# Patient Record
Sex: MALE | Race: WHITE | NOT HISPANIC OR LATINO | Employment: FULL TIME | URBAN - NONMETROPOLITAN AREA
[De-identification: names, ages, dates, MRNs, and addresses within clinical notes are randomized per-mention and may not be internally consistent; named-entity substitution may affect disease eponyms.]

---

## 2017-05-19 ENCOUNTER — HISTORY (OUTPATIENT)
Dept: HEALTH INFORMATION MANAGEMENT | Facility: OTHER | Age: 22
End: 2017-05-19

## 2017-05-24 ENCOUNTER — HISTORY (OUTPATIENT)
Dept: FAMILY MEDICINE | Facility: OTHER | Age: 22
End: 2017-05-24

## 2017-05-24 ENCOUNTER — HOSPITAL ENCOUNTER (OUTPATIENT)
Dept: RADIOLOGY | Facility: OTHER | Age: 22
End: 2017-05-24
Attending: NURSE PRACTITIONER

## 2017-05-24 ENCOUNTER — OFFICE VISIT - GICH (OUTPATIENT)
Dept: FAMILY MEDICINE | Facility: OTHER | Age: 22
End: 2017-05-24

## 2017-05-24 DIAGNOSIS — M25.562 PAIN IN LEFT KNEE: ICD-10-CM

## 2017-05-24 DIAGNOSIS — M25.561 PAIN IN RIGHT KNEE: ICD-10-CM

## 2017-05-24 DIAGNOSIS — G89.29 OTHER CHRONIC PAIN: ICD-10-CM

## 2017-05-24 DIAGNOSIS — R37 SEXUAL DYSFUNCTION: ICD-10-CM

## 2017-05-24 DIAGNOSIS — M25.571 PAIN IN RIGHT ANKLE: ICD-10-CM

## 2017-05-24 DIAGNOSIS — N52.9 MALE ERECTILE DYSFUNCTION: ICD-10-CM

## 2017-05-24 LAB
ABSOLUTE BASOPHILS - HISTORICAL: 0.1 THOU/CU MM
ABSOLUTE EOSINOPHILS - HISTORICAL: 0.5 THOU/CU MM
ABSOLUTE LYMPHOCYTES - HISTORICAL: 2.4 THOU/CU MM (ref 0.9–2.9)
ABSOLUTE MONOCYTES - HISTORICAL: 0.4 THOU/CU MM
ABSOLUTE NEUTROPHILS - HISTORICAL: 4.3 THOU/CU MM (ref 1.7–7)
ANION GAP - HISTORICAL: 10 (ref 5–18)
BASOPHILS # BLD AUTO: 0.6 %
BUN SERPL-MCNC: 20 MG/DL (ref 7–25)
BUN/CREAT RATIO - HISTORICAL: 23
CALCIUM SERPL-MCNC: 9.4 MG/DL (ref 8.6–10.3)
CHLORIDE SERPLBLD-SCNC: 101 MMOL/L (ref 98–107)
CO2 SERPL-SCNC: 26 MMOL/L (ref 21–31)
CREAT SERPL-MCNC: 0.88 MG/DL (ref 0.7–1.3)
EOSINOPHIL NFR BLD AUTO: 6.3 %
ERYTHROCYTE [DISTWIDTH] IN BLOOD BY AUTOMATED COUNT: 13.2 % (ref 11.5–15.5)
GFR IF NOT AFRICAN AMERICAN - HISTORICAL: >60 ML/MIN/1.73M2
GLUCOSE SERPL-MCNC: 137 MG/DL (ref 70–105)
HCT VFR BLD AUTO: 47 % (ref 37–53)
HEMOGLOBIN: 16.9 G/DL (ref 13.5–17.5)
LYMPHOCYTES NFR BLD AUTO: 31.6 % (ref 20–44)
MCH RBC QN AUTO: 29.2 PG (ref 26–34)
MCHC RBC AUTO-ENTMCNC: 36 G/DL (ref 32–36)
MCV RBC AUTO: 81 FL (ref 80–100)
MONOCYTES NFR BLD AUTO: 5.4 %
NEUTROPHILS NFR BLD AUTO: 55.3 % (ref 42–72)
PLATELET # BLD AUTO: 373 THOU/CU MM (ref 140–440)
PMV BLD: 8.5 FL (ref 6.5–11)
POTASSIUM SERPL-SCNC: 4.1 MMOL/L (ref 3.5–5.1)
RED BLOOD COUNT - HISTORICAL: 5.79 MIL/CU MM (ref 4.3–5.9)
SODIUM SERPL-SCNC: 137 MMOL/L (ref 133–143)
TSH - HISTORICAL: 1.49 UIU/ML (ref 0.34–5.6)
WHITE BLOOD COUNT - HISTORICAL: 7.7 THOU/CU MM (ref 4.5–11)

## 2017-05-25 ENCOUNTER — COMMUNICATION - GICH (OUTPATIENT)
Dept: FAMILY MEDICINE | Facility: OTHER | Age: 22
End: 2017-05-25

## 2017-05-31 ENCOUNTER — HISTORY (OUTPATIENT)
Dept: UROLOGY | Facility: OTHER | Age: 22
End: 2017-05-31

## 2017-05-31 ENCOUNTER — OFFICE VISIT - GICH (OUTPATIENT)
Dept: UROLOGY | Facility: OTHER | Age: 22
End: 2017-05-31

## 2017-05-31 DIAGNOSIS — R37 SEXUAL DYSFUNCTION: ICD-10-CM

## 2017-05-31 DIAGNOSIS — N52.9 MALE ERECTILE DYSFUNCTION: ICD-10-CM

## 2017-06-05 ENCOUNTER — OFFICE VISIT - GICH (OUTPATIENT)
Dept: FAMILY MEDICINE | Facility: OTHER | Age: 22
End: 2017-06-05

## 2017-06-05 ENCOUNTER — HISTORY (OUTPATIENT)
Dept: FAMILY MEDICINE | Facility: OTHER | Age: 22
End: 2017-06-05

## 2017-06-05 DIAGNOSIS — M25.562 PAIN IN LEFT KNEE: ICD-10-CM

## 2017-06-05 DIAGNOSIS — M25.561 PAIN IN RIGHT KNEE: ICD-10-CM

## 2017-06-05 DIAGNOSIS — M25.571 PAIN IN RIGHT ANKLE: ICD-10-CM

## 2017-06-05 DIAGNOSIS — G89.29 OTHER CHRONIC PAIN: ICD-10-CM

## 2017-08-04 ENCOUNTER — HISTORY (OUTPATIENT)
Dept: EMERGENCY MEDICINE | Facility: OTHER | Age: 22
End: 2017-08-04

## 2017-08-23 ENCOUNTER — OFFICE VISIT - GICH (OUTPATIENT)
Dept: FAMILY MEDICINE | Facility: OTHER | Age: 22
End: 2017-08-23

## 2017-08-23 ENCOUNTER — HISTORY (OUTPATIENT)
Dept: FAMILY MEDICINE | Facility: OTHER | Age: 22
End: 2017-08-23

## 2017-08-23 DIAGNOSIS — K21.9 GASTRO-ESOPHAGEAL REFLUX DISEASE WITHOUT ESOPHAGITIS: ICD-10-CM

## 2017-08-30 ENCOUNTER — OFFICE VISIT - GICH (OUTPATIENT)
Dept: FAMILY MEDICINE | Facility: OTHER | Age: 22
End: 2017-08-30

## 2017-08-30 DIAGNOSIS — F32.9 MAJOR DEPRESSIVE DISORDER, SINGLE EPISODE: ICD-10-CM

## 2017-08-30 ASSESSMENT — PATIENT HEALTH QUESTIONNAIRE - PHQ9: SUM OF ALL RESPONSES TO PHQ QUESTIONS 1-9: 10

## 2017-10-17 ENCOUNTER — HISTORY (OUTPATIENT)
Dept: FAMILY MEDICINE | Facility: OTHER | Age: 22
End: 2017-10-17

## 2017-10-17 ENCOUNTER — OFFICE VISIT - GICH (OUTPATIENT)
Dept: FAMILY MEDICINE | Facility: OTHER | Age: 22
End: 2017-10-17

## 2017-10-17 ENCOUNTER — COMMUNICATION - GICH (OUTPATIENT)
Dept: FAMILY MEDICINE | Facility: OTHER | Age: 22
End: 2017-10-17

## 2017-10-17 DIAGNOSIS — R05.9 COUGH: ICD-10-CM

## 2017-10-17 DIAGNOSIS — J02.9 ACUTE PHARYNGITIS: ICD-10-CM

## 2017-10-17 DIAGNOSIS — J20.8 ACUTE BRONCHITIS DUE TO OTHER SPECIFIED ORGANISMS: ICD-10-CM

## 2017-10-17 LAB — STREP A ANTIGEN - HISTORICAL: NEGATIVE

## 2017-10-30 ENCOUNTER — COMMUNICATION - GICH (OUTPATIENT)
Dept: FAMILY MEDICINE | Facility: OTHER | Age: 22
End: 2017-10-30

## 2017-10-30 DIAGNOSIS — J20.8 ACUTE BRONCHITIS DUE TO OTHER SPECIFIED ORGANISMS: ICD-10-CM

## 2017-12-28 NOTE — TELEPHONE ENCOUNTER
Patient Information     Patient Name MRN Sex Phi Nair [id number] Male 1995      Telephone Encounter by Jeny Hemphill at 10/30/2017  4:15 PM     Author:  Jeny Hemphill Service:  (none) Author Type:  (none)     Filed:  10/30/2017  4:16 PM Encounter Date:  10/30/2017 Status:  Signed     :  Jeny Hemphill            RX faxed.  Jeny Hemphill LPN........................10/30/2017  4:16 PM

## 2017-12-28 NOTE — PROGRESS NOTES
Patient Information     Patient Name MRN Sex     Phi Duncan [id number] Male 1995      Progress Notes by Nery Arroyo PA-C at 10/17/2017 11:15 AM     Author:  Nery Arroyo PA-C Service:  (none) Author Type:  PHYS- Physician Assistant     Filed:  10/17/2017 12:47 PM Encounter Date:  10/17/2017 Status:  Signed     :  Nery Arroyo PA-C (PHYS- Physician Assistant)            Nursing Notes:   Jeny Hemphill  10/17/2017 11:21 AM  Signed  Patient presents to the clinic for cough that he states he has had for a while now.  Jeny Hemphill LPN........................10/17/2017  11:02 AM      HPI:    Phi Duncan is a 21 y.o. male who presents for cough that he states he has had for a while now. Girlfriend had a cold as well.   He is not sleeping at night. Wake up coughing. Phlegm. Throat hurts. Coughing. Chest is tight. Short of breath at times. Wheezing.  No rattling. No inhaler use now. Ears wouldn't pop yesterday. No ear pain. Sinus HA yesterday.  Symptoms started on 10/13. No fevers, chills, GI symptoms.       Past Medical History:     Diagnosis  Date     ADHD (attention deficit hyperactivity disorder)      Depression      No Significant Past Medical History      Problems related to lack of adequate sleep        Past Surgical History:      Procedure  Laterality Date     NO PREVIOUS SURGERY         Social History        Substance Use Topics          Smoking status:   Current Every Day Smoker      Packs/day:  0.50      Years:  8.00      Types:  Cigarettes      Smokeless tobacco:   Never Used       Comment: trying to quit       Alcohol use   0.0 oz/week     0 Standard drinks or equivalent per week        Comment: Rare         Current Outpatient Prescriptions       Medication  Sig Dispense Refill     famotidine (PEPCID) 20 mg tablet Take 1 tablet by mouth once daily if needed. 30 tablet 5     No current facility-administered medications for this visit.      Medications have been  reviewed by me and are current to the best of my knowledge and ability.      No Known Allergies    REVIEW OF SYSTEMS:  Refer to HPI.    EXAM:   Vitals:    /66  Pulse 56  Temp 97.5  F (36.4  C) (Tympanic)  Wt 81.6 kg (179 lb 12.8 oz)  SpO2 95%  BMI 29.02 kg/m2    General Appearance: Pleasant, alert, appropriate appearance for age. No acute distress  Ear Exam: Normal TM's bilaterally, grey, translucent, bony landmarks appreciated.   Left/Right TM: Effusion is not present. TM is not bulging. There is no pus appreciated.    Normal auditory canals and external ears. Non-tender.   OroPharynx Exam:  Erythematous posterior pharynx with no exudates. No sinus pain upon palpation of frontal, ethmoid, and maxillary sinuses.  Chest/Respiratory Exam: Normal chest wall and respirations. Clear to auscultation. No wheezing, rattling appreciated. No retractions appreciated.  Cardiovascular Exam: Regular rate and rhythm. S1, S2, no murmur, click, gallop, or rubs.  Lymphatic Exam: ACLN.  Skin: no rash or abnormalities  Psychiatric Exam: Alert and oriented - appropriate affect.    PHQ Depression Screen  Date of PHQ exam: 10/17/17  Over the last 2 weeks, how often have you been bothered by any of the following problems?  1. Little interest or pleasure in doing things: 0 - Not at all  2. Feeling down, depressed, or hopeless: 0 - Not at all       LABS:    Results for orders placed or performed in visit on 10/17/17       RAPID STREP WITH REFLEX CULTURE       Result  Value Ref Range Status    STREP A ANTIGEN           Negative Negative Final       ASSESSMENT AND PLAN:      ICD-10-CM    1. Viral bronchitis J20.8    2. Cough R05 benzonatate (TESSALON PERLES) 100 mg capsule   3. Sore throat J02.9 RAPID STREP WITH REFLEX CULTURE      RAPID STREP WITH REFLEX CULTURE       Viral bronchitis.   Negative strep. No antibiotics warranted at this time.   Started on tessalon perles for comfort.     Patient Instructions   May use symptomatic  care with tylenol or ibuprofen. Sudafed or mucinex work well for congestion. May use cough syrup or cough drops.  Using a humidifier works well to break up the congestion. You can also sleep propped up on a couple pillows to decrease symptoms at night. A nettipot works well to decrease nasal congestion.    Use a Neti Pot/sinus flush (Flaquito Med Sinus Rinse) 3 times daily to irrigate sinuses/mucosal tissue.     Sudafed or mucinex work well for congestion.   If you choose pseudoephedrine, use for only 5-7 days AS DIRECTED. Speak to your pharmacist if you have any concerns about your medications. May also use decongestant nasal spray, but only for 3 days MAXIMUM.    You will need to be evaluated if you start to experience:  Fever higher than 102.5 F (39.2 C)   Sudden and severe pain in the face and head   Trouble seeing or seeing double   Trouble thinking clearly   Swelling or redness around 1 or both eyes   Trouble breathing or a stiff neck    * If you are a smoker, try to quit *    Call 9-1-1 or go to the emergency room if you:  Have trouble breathing   Are drooling because you cannot swallow your saliva   Have swelling of the neck or tongue   Cannot move your neck or have trouble opening your mouth        Nery Arroyo PA-C..................10/17/2017 11:06 AM

## 2017-12-28 NOTE — TELEPHONE ENCOUNTER
Patient Information     Patient Name MRN Sex Phi Nair [id number] Male 1995      Telephone Encounter by Jeny Hemphill at 10/17/2017  1:08 PM     Author:  Jeny Hemphill Service:  (none) Author Type:  (none)     Filed:  10/17/2017  1:09 PM Encounter Date:  10/17/2017 Status:  Signed     :  Jeny Hemphill            SEE RESULT NOTE.  Jeny Hemphill LPN........................10/17/2017  1:08 PM

## 2017-12-28 NOTE — PROGRESS NOTES
Patient Information     Patient Name MRN Sex     Phi Duncan [id number] Male 1995      Progress Notes by Nery Arroyo PA-C at 2017 10:45 AM     Author:  Nery Arroyo PA-C Service:  (none) Author Type:  PHYS- Physician Assistant     Filed:  2017  3:05 PM Encounter Date:  2017 Status:  Signed     :  Nery Arroyo PA-C (PHYS- Physician Assistant)            Nursing Notes:   Sarah Rosen  2017 11:01 AM  Signed  Patient is wondering about  papers.  Lives in apartment complex that only allows fish  Patient is thinking about a cat  Sarah Rosen LPN 2017 10:36 AM      HPI:    Phi Duncan is a 21 y.o. male who presents for  papers.  Lives in apartment complex that only allows fish  Patient is thinking about a cat.   Hx dog and cat - improved mood in the past. Hx depression, ADHD, FAS. No meds now.   His girlfriend also has history of depression.  They do not currently have a catheter time. He is thinking about getting a cat. His current apartment does not allow them to have anything except fish. He states he feels more calm and relaxed when he has a pet. He is not currently on medications for anxiety or depression. Not currently seeing a counselor. He has been on medications in the past and has seen counseling in the past. He states that he feels stable. No suicidal or homicidal ideation appreciated. No weight changes recently. Patient does state he has lack of interest.    Past Medical History:     Diagnosis  Date     ADHD (attention deficit hyperactivity disorder)      Depression      No Significant Past Medical History      Problems related to lack of adequate sleep        Past Surgical History:      Procedure  Laterality Date     NO PREVIOUS SURGERY         Social History        Substance Use Topics          Smoking status:   Current Every Day Smoker      Packs/day:  0.50      Years:  8.00      Types:  Cigarettes      Smokeless  tobacco:   Never Used       Comment: trying to quit       Alcohol use   0.0 oz/week     0 Standard drinks or equivalent per week        Comment: Rare         Current Outpatient Prescriptions       Medication  Sig Dispense Refill     famotidine (PEPCID) 20 mg tablet Take 1 tablet by mouth once daily if needed. 30 tablet 5     No current facility-administered medications for this visit.      Medications have been reviewed by me and are current to the best of my knowledge and ability.      No Known Allergies    REVIEW OF SYSTEMS:  Refer to HPI.    EXAM:   Vitals:    /80  Pulse 69  Wt 84.2 kg (185 lb 9.6 oz)  BMI 29.96 kg/m2  General Appearance: Pleasant, alert, appropriate appearance for age. No acute distress  General appearance: appropriately dressed and well groomed  Pt's manner is cooperative and engaged in interview, affect appropriate for situation and matches verbal content and speech is fluent and normal volume and tone.  No SI or HI appreciated.    PHQ Depression Screen  Date of PHQ exam: 17  Over the last 2 weeks, how often have you been bothered by any of the following problems?  1. Little interest or pleasure in doing things: 3 - Nearly every day  2. Feeling down, depressed, or hopeless: 0 - Not at all  3. Trouble falling or staying asleep, or sleeping too much: 3 - Nearly every day  4. Feeling tired or having little energy: 3 - Nearly every day  5. Poor appetite or overeatin - Not at all  6. Feeling bad about yourself - or that you are a failure or have let yourself or your family down: 0 - Not at all  7. Trouble concentrating on things, such as reading the newspaper or watching television: 1 - Several days  8. Moving or speaking so slowly that other people could have noticed. Or the opposite - being so fidgety or restless that you have been moving around a lot more than usual: 0 - Not at all  9. Thoughts that you would be better off dead, or of hurting yourself in some way: 0 - Not at  all    PHQ-9 TOTAL SCORE: (!) 10  Depression Severity Level: moderate  If any answers were positive, how difficult have these problems made it for you to do your work, take care of things at home, or get along with other people: somewhat difficult    ASSESSMENT AND PLAN:      ICD-10-CM    1. Depression, unspecified depression type F32.9        Declines medication and counseling at this time.  Wrote patient a letter to have a  cat in his apartment.  Patient will recheck as needed if symptoms are changing or worsening.    Nery Arrooy PA-C..................8/30/2017 11:01 AM
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
## 2017-12-28 NOTE — TELEPHONE ENCOUNTER
Patient Information     Patient Name MRN Sex Phi Nair [id number] Male 1995      Telephone Encounter by Jeny Hemphill at 10/30/2017  1:23 PM     Author:  Jeny Hemphill Service:  (none) Author Type:  (none)     Filed:  10/30/2017  1:25 PM Encounter Date:  10/30/2017 Status:  Signed     :  Jeny Hemphill            Received fax stating that tessalon not covered by insurance.  Alternatives include Tussin DM and Vandana BARKSDALE.   Spoke with patient who states he was not able to  the medication and does still have the cough.  He would like alternative sent to pharmacy.  Jeny Hemphill LPN........................10/30/2017  1:24 PM

## 2017-12-28 NOTE — PATIENT INSTRUCTIONS
Patient Information     Patient Name MRN Sex Phi Nair [id number] Male 1995      Patient Instructions by Markos Melendrez MD at 2017  3:30 PM     Author:  Markos Melendrez MD  Service:  (none) Author Type:  Physician     Filed:  2017  3:31 PM  Encounter Date:  2017 Status:  Addendum     :  Markos Melendrez MD (Physician)        Related Notes: Original Note by Markos Melendrez MD (Physician) filed at 2017  3:30 PM            OK to use famotidine to help with heartburn as needed. OK to combine these with TUMS or Maalox  If happening every day, then we may need to look into things more  Cut out nicotine (quit smoking!)

## 2017-12-28 NOTE — TELEPHONE ENCOUNTER
Patient Information     Patient Name MRN Sex Phi Nair [id number] Male 1995      Telephone Encounter by Nery Arroyo PA-C at 10/30/2017  4:14 PM     Author:  Nery Arroyo PA-C Service:  (none) Author Type:  PHYS- Physician Assistant     Filed:  10/30/2017  4:15 PM Encounter Date:  10/30/2017 Status:  Signed     :  Nery Arroyo PA-C (PHYS- Physician Assistant)            Switched to cheratussin. Please fax.  Nery Arroyo PA-C ....................  10/30/2017   4:14 PM
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
## 2017-12-30 NOTE — NURSING NOTE
"Patient Information     Patient Name MRN Phi Marie [id number] Male 1995      Nursing Note by Marnie Gomes at 2017  1:00 PM     Author:  Marnie Gomes Service:  (none) Author Type:  (none)     Filed:  2017  1:18 PM Encounter Date:  2017 Status:  Signed     :  Marnie Gomes            Patient presents today with complaints of bilateral knee and shin pain when he ambulates. Patient denies any apparent injury. States this pain has been occurring for \"several months\".  Marnie Gomes LPN .............2017  1:04 PM          "

## 2017-12-30 NOTE — NURSING NOTE
Patient Information     Patient Name MRN Phi Marie [id number] Male 1995      Nursing Note by Jeny Hemphill at 10/17/2017 11:15 AM     Author:  Jeny Hemphill Service:  (none) Author Type:  (none)     Filed:  10/17/2017 11:21 AM Encounter Date:  10/17/2017 Status:  Signed     :  Jeny Hemphill            Patient presents to the clinic for cough that he states he has had for a while now.  Jeny Hemphill LPN........................10/17/2017  11:02 AM

## 2017-12-30 NOTE — NURSING NOTE
Patient Information     Patient Name MRN Sex Phi Nair [id number] Male 1995      Nursing Note by Loretta Dubon at 2017  3:30 PM     Author:  Loretta Dubon Service:  (none) Author Type:  (none)     Filed:  2017  3:23 PM Encounter Date:  2017 Status:  Signed     :  Loretta Dubon            Phi Duncan is a 21 y.o. male presenting with what he states is acid reflux.  Loretta Dubon LPN 2017 3:00 PM

## 2017-12-30 NOTE — NURSING NOTE
Patient Information     Patient Name MRN Sex Phi Nair [id number] Male 1995      Nursing Note by Sarah Rosen at 2017 10:45 AM     Author:  Sarah Rosen Service:  (none) Author Type:  (none)     Filed:  2017 11:01 AM Encounter Date:  2017 Status:  Signed     :  Sarah Rosen            Patient is wondering about  papers.  Lives in apartment complex that only allows fish  Patient is thinking about a cat  Sarah Rosen LPN 2017 10:36 AM

## 2018-01-05 NOTE — NURSING NOTE
Patient Information     Patient Name MRN Sex Phi Nair [id number] Male 1995      Nursing Note by Marcella Hogan at 2017 11:30 AM     Author:  Marcella Hogan Service:  (none) Author Type:  (none)     Filed:  2017 11:40 AM Encounter Date:  2017 Status:  Signed     :  Marcella Hogan            Patient is here today for a consult ED.   Review of Systems:    Weight loss:    No     Recent fever/chills:  No   Night sweats:   No  Current skin rash:  No   Recent hair loss:  No  Heat intolerance:  No   Cold intolerance:  No  Chest pain:   No   Palpitations:   No  Shortness of breath:  No   Wheezing:   No  Constipation:    No   Diarrhea:   No   Nausea:   No   Vomiting:   No   Kidney/side pain:  No   Back pain:   No  Frequent headaches:  yes   Dizziness:     No  Leg swelling:   No   Calf pain:    No    Parents, brothers or sisters with history of kidney cancer?   No  Parents, brothers or sisters with history of bladder cancer: No  Marcella Hogan LPN......................2017 11:33 AM

## 2018-01-05 NOTE — NURSING NOTE
Patient Information     Patient Name MRN Sex Phi Nair [id number] Male 1995      Nursing Note by Camila Carty at 2017 10:00 AM     Author:  Camila Carty Service:  (none) Author Type:  (none)     Filed:  2017 10:37 AM Encounter Date:  2017 Status:  Signed     :  Camila Carty            Patient presents to clinic today to Establish Care. He states he has been having bilateral leg pain starting a couple of years ago. He states his legs go numb and his shins feel tight and then he experiences shooting pain under is knees.    Camila Carty LPN...................2017  10:11 AM

## 2018-01-05 NOTE — PROGRESS NOTES
Patient Information     Patient Name MRPhi Dunbar [id number] Male 1995      Progress Notes by Avinash Miguel MD at 2017 11:30 AM     Author:  Avinash Miguel MD Service:  (none) Author Type:  Physician     Filed:  2017 11:54 AM Encounter Date:  2017 Status:  Signed     :  Avinash Miguel MD (Physician)            I was asked to see this patient by Diana Galdamez NP and provide my opinion about the following:  Erectile dysfunction    Type of Visit  Consult    Chief Complaint  Erectile dysfunction  Infertility    HPI  Mr. Duncan is a 21 y.o. male with psychogenic erectile dysfunction and concerns regarding fertility.    He and his girlfriend have intercourse about 3 times a week.  States he just doesn't feel in the mood on average once a week.  The other two episodes per week on average he has no issues.  His girlfriend of 2 years has no medical problems and she has not seen a gynecologist.  He has no medical problems and has never had surgery.  He takes no medications.      Past Medical History  He  has a past medical history of ADHD (attention deficit hyperactivity disorder); Depression; No Significant Past Medical History; and Problems related to lack of adequate sleep.  Patient Active Problem List     Diagnosis  Code     FETAL ALCOHOL SYNDROME [id number]     VERRUCA VULGARIS B07.9     CONSTIPATION K59.00     ADHD F90.9     ROTATOR CUFF INJURY, MILD, RIGHT SHOULDER M71.9, M67.919     SOMATIC DYSFUNCTION, SPINE, THORACIC-LUMBAR M99.9     SOMATIC DYSFUNCTION, SPINE, CERVICAL-THORACIC M99.81     Dog bite W54.0XXA       Past Surgical History  He  has a past surgical history that includes no previous surgery.    Medications  He currently has no medications in their medication list.    Allergies  No Known Allergies    Social History  He  reports that he has been smoking Cigarettes.  He has a 4.00 pack-year smoking history. He has never used smokeless tobacco. He reports that he  drinks alcohol. He reports that he does not use illicit drugs.  No drug abuse.    Family History  Family History       Problem   Relation Age of Onset     Diabetes  Mother      Cancer  Mother      Unsure type- in pelvis        Good Health  Father        Review of Systems  I personally reviewed the ROS with the patient.    Nursing Notes:   Edison Marcella MCCOY  5/31/2017 11:40 AM  Signed  Patient is here today for a consult ED.   Review of Systems:    Weight loss:    No     Recent fever/chills:  No   Night sweats:   No  Current skin rash:  No   Recent hair loss:  No  Heat intolerance:  No   Cold intolerance:  No  Chest pain:   No   Palpitations:   No  Shortness of breath:  No   Wheezing:   No  Constipation:    No   Diarrhea:   No   Nausea:   No   Vomiting:   No   Kidney/side pain:  No   Back pain:   No  Frequent headaches:  yes   Dizziness:     No  Leg swelling:   No   Calf pain:    No    Parents, brothers or sisters with history of kidney cancer?   No  Parents, brothers or sisters with history of bladder cancer: No  Marcella oHgan LPN......................5/31/2017 11:33 AM        Physical Exam  Vitals:     05/31/17 1133   BP: 140/90   Pulse: 72   Weight: 83.6 kg (184 lb 3.2 oz)     Constitutional: No acute distress.  Alert and cooperative   Head: NCAT  Eyes: Conjunctivae normal  Cardiovascular: Regular rate  Pulmonary/Chest: Respirations are even and non-labored bilaterally, no audible wheezing  Abdominal: Soft. No distension, tenderness, masses or guarding.   Neurological: A + O x 3.  Cranial Nerves II-XII grossly intact.  Extremities: LETA x 4, Warm. No clubbing.  No cyanosis.    Skin: Pink, warm and dry.  No visible rashes noted.  Psychiatric:  Normal mood and affect  Back:  No left CVA tenderness.  No right CVA tenderness.  Genitourinary:  Nonpalpable bladder  Normal male phallus without discharge or lesions, normal pubic hair distribution.  Testicles descended bilaterally.     Labs  CREATININE (mg/dL)    Date Value    05/24/2017 0.88     Assessment & Plan  Mr. Duncan is a 21 y.o. male with psychogenic erectile dysfunction and concerns regarding fertility.  Given that he and his girlfriend are in her early 20s with no medical problems and attempting to conceive over the last few months only I recommended more time prior to pursuing a workup.  I explained that in fertility definition is after one year of unsuccessful attempts.  In regards to the erectile dysfunction he has no issues the majority of the time.  One third or less of the time he occasionally will run into issues with desire and erectile rigidity.  Given the infrequent occurrence of this I also recommended observation with no additional testing or treatment.  He will follow up if he has any issues in the future

## 2018-01-05 NOTE — PATIENT INSTRUCTIONS
Patient Information     Patient Name MRN Sex     Phi Duncan [id number] Male 1995      Patient Instructions by Diana Galdamez NP at 2017 10:00 AM     Author:  Diana Galdamez NP  Service:  (none) Author Type:  PHYS- Nurse Practitioner     Filed:  2017  4:59 PM  Encounter Date:  2017 Status:  Addendum     :  Diana Galdamez NP (PHYS- Nurse Practitioner)        Related Notes: Original Note by iDana Galdamez NP (PHYS- Nurse Practitioner) filed at 2017 11:00 AM            You will get apts with urology today    and orthopedics    followup in 2 months on progress on your knees and other issues

## 2018-01-26 VITALS
SYSTOLIC BLOOD PRESSURE: 112 MMHG | WEIGHT: 184.2 LBS | HEART RATE: 72 BPM | WEIGHT: 184 LBS | HEIGHT: 66 IN | BODY MASS INDEX: 29.73 KG/M2 | HEART RATE: 60 BPM | DIASTOLIC BLOOD PRESSURE: 90 MMHG | BODY MASS INDEX: 29.57 KG/M2 | SYSTOLIC BLOOD PRESSURE: 140 MMHG | DIASTOLIC BLOOD PRESSURE: 70 MMHG

## 2018-01-26 VITALS — WEIGHT: 183.6 LBS | DIASTOLIC BLOOD PRESSURE: 86 MMHG | HEART RATE: 72 BPM | SYSTOLIC BLOOD PRESSURE: 108 MMHG

## 2018-01-26 VITALS
WEIGHT: 187 LBS | BODY MASS INDEX: 30.18 KG/M2 | SYSTOLIC BLOOD PRESSURE: 120 MMHG | DIASTOLIC BLOOD PRESSURE: 88 MMHG | HEART RATE: 60 BPM

## 2018-01-26 VITALS
SYSTOLIC BLOOD PRESSURE: 108 MMHG | TEMPERATURE: 97.5 F | BODY MASS INDEX: 29.02 KG/M2 | DIASTOLIC BLOOD PRESSURE: 66 MMHG | HEART RATE: 56 BPM | OXYGEN SATURATION: 95 % | WEIGHT: 179.8 LBS

## 2018-01-26 VITALS
SYSTOLIC BLOOD PRESSURE: 118 MMHG | HEART RATE: 69 BPM | WEIGHT: 185.6 LBS | BODY MASS INDEX: 29.96 KG/M2 | DIASTOLIC BLOOD PRESSURE: 80 MMHG

## 2018-02-01 ENCOUNTER — DOCUMENTATION ONLY (OUTPATIENT)
Dept: FAMILY MEDICINE | Facility: OTHER | Age: 23
End: 2018-02-01

## 2018-02-01 PROBLEM — K21.9 GASTROESOPHAGEAL REFLUX DISEASE: Status: ACTIVE | Noted: 2017-08-23

## 2018-02-01 PROBLEM — F90.9 ADHD: Status: ACTIVE | Noted: 2018-02-01
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
## 2018-02-01 RX ORDER — FAMOTIDINE 20 MG/1
20 TABLET, FILM COATED ORAL DAILY PRN
COMMUNITY
Start: 2017-08-23 | End: 2019-01-10

## 2018-02-01 RX ORDER — CODEINE PHOSPHATE/GUAIFENESIN 10-100MG/5
10 LIQUID (ML) ORAL EVERY 4 HOURS PRN
COMMUNITY
Start: 2017-10-30 | End: 2018-12-07

## 2018-02-01 RX ORDER — BENZONATATE 100 MG/1
100 CAPSULE ORAL 3 TIMES DAILY PRN
COMMUNITY
Start: 2017-10-17 | End: 2018-12-07

## 2018-02-02 ASSESSMENT — PATIENT HEALTH QUESTIONNAIRE - PHQ9: SUM OF ALL RESPONSES TO PHQ QUESTIONS 1-9: 10

## 2018-07-23 NOTE — PROGRESS NOTES
Patient Information     Patient Name  Phi Duncan MRN  [id number] Sex  Male   1995      Letter by Diana Galdamez NP at      Author:  Diana Galdamez NP Service:  (none) Author Type:  (none)    Filed:   Encounter Date:  2017 Status:  (Other)           Phi Duncan  [address]  [facility] [address]          May 25, 2017    Dear Mr. Duncan:    I am happy to report the Following labs completed during your office visit show normal indicators or kidney and thyroid function and no evidence of anemia or infection.    Your knee xrays did not show any abnormalities with the joints, your ankle xray showed some changes in the bone that may reflect your past injury--your xrays will be reviewed  During your orthopedic office visit.    Please let me know if you have any questions or concerns.  It was a pleasure meeting you.    Results for orders placed or performed in visit on 17      TSH      Result  Value Ref Range    TSH 1.49 0.34 - 5.60 uIU/mL   BASIC METABOLIC PANEL      Result  Value Ref Range    SODIUM 137 133 - 143 mmol/L    POTASSIUM 4.1 3.5 - 5.1 mmol/L    CHLORIDE 101 98 - 107 mmol/L    CO2,TOTAL 26 21 - 31 mmol/L    ANION GAP 10 5 - 18                    GLUCOSE 137 (H) 70 - 105 mg/dL    CALCIUM 9.4 8.6 - 10.3 mg/dL    BUN 20 7 - 25 mg/dL    CREATININE 0.88 0.70 - 1.30 mg/dL    BUN/CREAT RATIO           23                    GFR if African American >60 >60 ml/min/1.73m2    GFR if not African American >60 >60 ml/min/1.73m2   CBC WITH AUTO DIFFERENTIAL      Result  Value Ref Range    WHITE BLOOD COUNT         7.7 4.5 - 11.0 thou/cu mm    RED BLOOD COUNT           5.79 4.30 - 5.90 mil/cu mm    HEMOGLOBIN                16.9 13.5 - 17.5 g/dL    HEMATOCRIT                47.0 37.0 - 53.0 %    MCV                       81 80 - 100 fL    MCH                       29.2 26.0 - 34.0 pg    MCHC                      36.0 32.0 - 36.0 g/dL    RDW                       13.2 11.5 - 15.5 %    PLATELET  COUNT            373 140 - 440 thou/cu mm    MPV                       8.5 6.5 - 11.0 fL    NEUTROPHILS               55.3 42.0 - 72.0 %    LYMPHOCYTES               31.6 20.0 - 44.0 %    MONOCYTES                 5.4 <12.0 %    EOSINOPHILS               6.3 <8.0 %    BASOPHILS                 0.6 <3.0 %    ABSOLUTE NEUTROPHILS      4.3 1.7 - 7.0 thou/cu mm    ABSOLUTE LYMPHOCYTES      2.4 0.9 - 2.9 thou/cu mm    ABSOLUTE MONOCYTES        0.4 <0.9 thou/cu mm    ABSOLUTE EOSINOPHILS      0.5 (H) <0.5 thou/cu mm    ABSOLUTE BASOPHILS        0.1 <0.3 thou/cu mm         If you have any further questions or problems contact my office at  [phone].    Thank you,    Diana Galdamez NP ....................  5/25/2017   4:44 PM

## 2018-07-23 NOTE — PROGRESS NOTES
Patient Information     Patient Name  Phi Duncan MRN  [id number] Sex  Male   1995      Letter by Nery Arroyo PA-C at      Author:  Nery Arroyo PA-C Service:  (none) Author Type:  (none)    Filed:   Encounter Date:  2017 Status:  (Other)           Phi Duncan  [address]  [facility] [address]          2017    To whom it may concern:    I am currently seeing Phi Duncan ( 1995). He has been diagnosed with depression. In my medical opinion, I feel that a  cat would be helpful to improve his mood and decrease his depression symptoms.     Please let me know if you have any questions or concerns.     Thank you,        Nery Arroyo PA-C ....................  2017   11:07 AM

## 2018-10-22 NOTE — PROGRESS NOTES
Patient Information     Patient Name MRN Sex     Phi Duncan [id number] Male 1995      Progress Notes by Markos Melendrez MD at 2017  3:30 PM     Author:  Markos Melendrez MD Service:  (none) Author Type:  Physician     Filed:  2017  1:41 PM Encounter Date:  2017 Status:  Signed     :  Markos Melendrez MD (Physician)            SUBJECTIVE:  21 y.o. male presents for heartburn for 2 years. Takes TUMS once to twice weekly, but does not seem to help much. Rarely wakes at night with heartburn.  Occasional beer. Drinks a lot of coffee. Smokes 1/2 ppd for 6 years.   Denies melena    REVIEW OF SYSTEMS:    Constitutional: Negative  Respiratory: Negative  Cardiovascular: Negative      No current outpatient prescriptions on file.     Allergies as of 2017      (No Known Allergies)       OBJECTIVE:  /88  Pulse 60  Wt 84.8 kg (187 lb)  BMI 30.18 kg/m2    General Appearance: Alert. No acute distress  Chest/Respiratory Exam: Clear to auscultation bilaterally  Cardiovascular Exam: Regular rate and rhythm. S1, S2, no murmur, gallop, or rubs.  Gastrointestinal Exam: Soft, nontender, no abnormal masses or organomegaly.  Extremities: 2+ pedal pulses.  No lower extremity edema.      ASSESSMENT/PLAN:    ICD-10-CM   1. Gastroesophageal reflux disease, esophagitis presence not specified K21.9     Periodic GERD. May use on demand H2 blocker. No need for daily use, may cause other problems. OK to use with immediate antacid like TUMS or Maalox.  Rx for famotidine  F/U PRN          
Refill request for: Meloxicam-due-last filled on 12/14/16.     LOV- 12/29/16     
Initial (On Arrival)

## 2018-12-07 ENCOUNTER — OFFICE VISIT (OUTPATIENT)
Dept: FAMILY MEDICINE | Facility: OTHER | Age: 23
End: 2018-12-07
Attending: FAMILY MEDICINE
Payer: COMMERCIAL

## 2018-12-07 VITALS
WEIGHT: 182.8 LBS | SYSTOLIC BLOOD PRESSURE: 124 MMHG | DIASTOLIC BLOOD PRESSURE: 68 MMHG | HEART RATE: 60 BPM | BODY MASS INDEX: 29.5 KG/M2

## 2018-12-07 DIAGNOSIS — R10.12 ABDOMINAL PAIN, LEFT UPPER QUADRANT: ICD-10-CM

## 2018-12-07 DIAGNOSIS — R25.2 HAND CRAMPS: ICD-10-CM

## 2018-12-07 PROCEDURE — G0463 HOSPITAL OUTPT CLINIC VISIT: HCPCS

## 2018-12-07 PROCEDURE — 99214 OFFICE O/P EST MOD 30 MIN: CPT | Performed by: FAMILY MEDICINE

## 2018-12-07 ASSESSMENT — PATIENT HEALTH QUESTIONNAIRE - PHQ9: SUM OF ALL RESPONSES TO PHQ QUESTIONS 1-9: 0

## 2018-12-07 ASSESSMENT — PAIN SCALES - GENERAL: PAINLEVEL: MODERATE PAIN (4)

## 2018-12-07 NOTE — MR AVS SNAPSHOT
After Visit Summary   12/7/2018    Phi Duncan    MRN: [id number]           Patient Information     Date Of Birth          1995        Visit Information        Provider Department      12/7/2018 11:00 AM Rogelio Parsons MD [facility]        Today's Diagnoses     Hand cramps        Abdominal pain, left upper quadrant           Follow-ups after your visit        Who to contact     If you have questions or need follow up information about today's clinic visit or your schedule please contact [facility] directly at [phone].  Normal or non-critical lab and imaging results will be communicated to you by MyChart, letter or phone within 4 business days after the clinic has received the results. If you do not hear from us within 7 days, please contact the clinic through MyChart or phone. If you have a critical or abnormal lab result, we will notify you by phone as soon as possible.  Submit refill requests through [employer] or call your pharmacy and they will forward the refill request to us. Please allow 3 business days for your refill to be completed.          Additional Information About Your Visit        Care EveryWhere ID     This is your Care EveryWhere ID. This could be used by other organizations to access your [facility] medical records  [id number]        Your Vitals Were     Pulse BMI (Body Mass Index)                60 29.5 kg/m2           Blood Pressure from Last 3 Encounters:   12/07/18 124/68   10/17/17 108/66   08/30/17 118/80    Weight from Last 3 Encounters:   12/07/18 182 lb 12.8 oz (82.9 kg)   10/17/17 179 lb 12.8 oz (81.6 kg)   08/30/17 185 lb 9.6 oz (84.2 kg)              Today, you had the following     No orders found for display         Today's Medication Changes          These changes are accurate as of 12/7/18 11:59 PM.  If you have any questions, ask your nurse or doctor.               Stop taking these medicines if you haven't  already. Please contact your care team if you have questions.     benzonatate 100 MG capsule   Commonly known as:  TESSALON   Stopped by:  Rogelio Parsons MD           guaiFENesin-codeine 100-10 MG/5ML syrup   Commonly known as:  guaiFENesin AC   Stopped by:  Rogelio Parsons MD                    Primary Care Provider Office Phone # Fax #    [facility] [phone]       [address]  [facility] [address]        Equal Access to Services     [facility] : Hadii aad ku hadasho Soomaali, waaxda luqadaha, qaybta kaalmada adeegyada, waxay idiin hayaan adeeg marlenearaneyda whitlock . So [facility] [phone].    ATENCIÓN: Si habla español, tiene a juan disposición servicios gratuitos de asistencia lingüística. [facility] [phone].    We comply with applicable federal civil rights laws and [place] laws. We do not discriminate on the basis of race, color, national origin, age, disability, sex, sexual orientation, or gender identity.            Thank you!     Thank you for choosing [facility] AND [facility]  for your care. Our goal is always to provide you with excellent care. Hearing back from our patients is one way we can continue to improve our services. Please take a few minutes to complete the written survey that you may receive in the mail after your visit with us. Thank you!             Your Updated Medication List - Protect others around you: Learn how to safely use, store and throw away your medicines at www.disposemymeds.org.          This list is accurate as of 12/7/18 11:59 PM.  Always use your most recent med list.                   Brand Name Dispense Instructions for use Diagnosis    famotidine 20 MG tablet    PEPCID     Take 20 mg by mouth daily as needed

## 2018-12-07 NOTE — NURSING NOTE
Patient here for hand cramping and occasional left side stomach pain. Medication Reconciliation: complete.    Loretta Salgado LPN  12/7/2018 11:04 AM

## 2018-12-08 PROBLEM — R25.2 HAND CRAMPS: Status: ACTIVE | Noted: 2018-12-08

## 2018-12-08 PROBLEM — R10.12 ABDOMINAL PAIN, LEFT UPPER QUADRANT: Status: ACTIVE | Noted: 2018-12-08

## 2018-12-08 ASSESSMENT — ENCOUNTER SYMPTOMS
CHILLS: 0
EYES NEGATIVE: 1
PSYCHIATRIC NEGATIVE: 1
DIZZINESS: 0
RESPIRATORY NEGATIVE: 1
FEVER: 0

## 2018-12-08 NOTE — PROGRESS NOTES
"  SUBJECTIVE:   Phi Duncan is a 22 year old male who presents to clinic today for the following health issues: Cramping hands and stomach issues    HPI Comments: Patient arrives here with a main complaint of cramping hands.  States his hands have been cramping for about 2 years.  He has been evaluated in the past and placed in wrist scars.  He reports no improvement.  Nothing seems to make it better doing dishes seems to make it worse writing seems to make it worse.  He states that every time he does dishes he has to \"pull his hands apart.  There is no numbness there is no weakness.  He denies any injury.  His other complaint is some stomach issues.  He reports he gets a lower abdominal discomfort that is sharp.  Last for a couple seconds and then resolves.  It is in the lower abdomen but also on the left side.  No changes in bowel or bladder habits.  He is tried ibuprofen without any relief.  Feels like his abdomen \"spasms\"    Musculoskeletal Problem   Pertinent negatives include no chest pain, chills or fever.         Patient Active Problem List    Diagnosis Date Noted     Hand cramps 2018     Priority: Medium     Abdominal pain, left upper quadrant 2018     Priority: Medium     ADHD 2018     Priority: Medium     Overview:   also FAES (per patient)       Dog bite 2018     Priority: Medium     Gastroesophageal reflux disease 2017     Priority: Medium     Disorder of bursae and tendons in shoulder region 2012     Priority: Medium     Other nonallopathic lesion of cervical region 2012     Priority: Medium     Nonallopathic lesion of thoracic region 2012     Priority: Medium     Constipation 2011     Priority: Medium     Viral warts 2011     Priority: Medium     Alcohol affecting fetus or  via placenta or breast milk 2010     Priority: Medium     Past Medical History:   Diagnosis Date     Attention-deficit hyperactivity disorder     No " Comments Provided     Major depressive disorder, single episode     No Comments Provided     Personal history of other medical treatment (CODE)     No Comments Provided     Sleep deprivation     No Comments Provided      Past Surgical History:   Procedure Laterality Date     OTHER SURGICAL HISTORY      HTE866,NO PREVIOUS SURGERY     Family History   Problem Relation Age of Onset     Diabetes Mother      Diabetes     Cancer Mother      Cancer,Unsure type- in pelvis     Family History Negative Father      Good Health       Review of Systems   Constitutional: Negative for chills and fever.   Eyes: Negative.    Respiratory: Negative.    Cardiovascular: Negative for chest pain.   Genitourinary: Negative.    Neurological: Negative for dizziness.   Psychiatric/Behavioral: Negative.         OBJECTIVE:     /68 (BP Location: Right arm, Patient Position: Sitting)  Pulse 60  Wt 182 lb 12.8 oz (82.9 kg)  BMI 29.5 kg/m2  Body mass index is 29.5 kg/(m^2).  Physical Exam   Constitutional: He appears well-developed.   Mildly obese   HENT:   Head: Normocephalic and atraumatic.   Abdominal: Soft. Bowel sounds are normal. He exhibits no distension and no mass. There is no tenderness. There is no rebound and no guarding.   Musculoskeletal:   He is got a good dorsal and radial pulse.  I cannot see any deformities in his hands.  Good strength.   Neurological: He is alert.       none     ASSESSMENT/PLAN:           ICD-10-CM    1. Hand cramps R25.2    2. Abdominal pain, left upper quadrant R10.12      Examination of his hands and abdomen were completely normal.  I suspect his abdominal pain is colonic contractions.  Something is lasting 2-3 seconds and resolving is consistent with colon spasms.  He is not having any blood or mucus in the stools.  This is been going on for some time.  We discussed his hand cramping and discussed caffeine he drinks about 3-4 pops per day.  May be that is the possibility.  I did advised eliminating  all caffeine from his diet.  Follow-up as needed    Rogelio Parsons MD  [facility]

## 2018-12-11 ENCOUNTER — OFFICE VISIT (OUTPATIENT)
Dept: FAMILY MEDICINE | Facility: OTHER | Age: 23
End: 2018-12-11
Attending: NURSE PRACTITIONER
Payer: COMMERCIAL

## 2018-12-11 VITALS
DIASTOLIC BLOOD PRESSURE: 82 MMHG | SYSTOLIC BLOOD PRESSURE: 118 MMHG | HEART RATE: 64 BPM | BODY MASS INDEX: 29.54 KG/M2 | WEIGHT: 183 LBS

## 2018-12-11 DIAGNOSIS — M25.561 ACUTE PAIN OF RIGHT KNEE: ICD-10-CM

## 2018-12-11 DIAGNOSIS — M62.830 BACK MUSCLE SPASM: Primary | ICD-10-CM

## 2018-12-11 PROCEDURE — G0463 HOSPITAL OUTPT CLINIC VISIT: HCPCS

## 2018-12-11 PROCEDURE — 99213 OFFICE O/P EST LOW 20 MIN: CPT | Performed by: NURSE PRACTITIONER

## 2018-12-11 RX ORDER — CYCLOBENZAPRINE HCL 10 MG
10 TABLET ORAL 3 TIMES DAILY PRN
Qty: 30 TABLET | Refills: 0 | Status: SHIPPED | OUTPATIENT
Start: 2018-12-11 | End: 2019-04-09

## 2018-12-11 ASSESSMENT — PAIN SCALES - GENERAL: PAINLEVEL: SEVERE PAIN (7)

## 2018-12-11 NOTE — NURSING NOTE
Patient presents to clinic today for right knee/back pain. He states no injury has occurred, but it started about 1 month ago.    No LMP for male patient.  Medication Reconciliation: complete    Camila Carty LPN  12/11/2018 10:43 AM

## 2018-12-11 NOTE — PROGRESS NOTES
HPI:    Phi Duncan is a 22 year old male who presents to clinic today for right knee and back pain. Reports sx started about 1 month ago. No known injuries. Has had shooting pain the right knee for about 1 month, intermittently. No swelling to knee. Pain worse with laying down, standing for long periods of time. He has tried some ibuprofen, not consistently. No heat or ice used. Has all over back spasms. Reports relief from marijuana, not using this now due to new baby. No hx of back issues.     Past Medical History:   Diagnosis Date     Attention-deficit hyperactivity disorder     No Comments Provided     Major depressive disorder, single episode     No Comments Provided     Personal history of other medical treatment (CODE)     No Comments Provided     Sleep deprivation     No Comments Provided       Current Outpatient Medications   Medication Sig Dispense Refill     cyclobenzaprine (FLEXERIL) 10 MG tablet Take 1 tablet (10 mg) by mouth 3 times daily as needed for muscle spasms 30 tablet 0     famotidine (PEPCID) 20 MG tablet Take 20 mg by mouth daily as needed         No Known Allergies    ROS:  Pertinent positives and negatives are noted in HPI.    EXAM:  General appearance: well appearing male, in no acute distress  Musculoskeletal: tenderness with palpation to bilateral thoracic paraspinal musculature. Normal ROM of spine. Normal exam of right knee.   Dermatological: no rashes or lesions  Psychological: normal affect, alert and pleasant    ASSESSMENT AND PLAN:    1. Back muscle spasm    2. Acute pain of right knee      Right knee pain and muscle spasms of back. Suspect knee pain related to back pain due to changes in walking. Plan to tx with NSAID's, heat to back and ice to knee along with flexeril for muscle spasms. Consider physical therapy and/or chiropractor if above tx is not helping.   Crissy Sarabia..................12/11/2018 10:40 AM

## 2018-12-11 NOTE — PATIENT INSTRUCTIONS
Naproxen twice daily with for food for a couple weeks  Flexeril 3 times daily as needed  Heat to back a few times daily  Ice to knee a few times daily  Follow-up as needed

## 2019-01-10 DIAGNOSIS — K21.9 GASTROESOPHAGEAL REFLUX DISEASE, ESOPHAGITIS PRESENCE NOT SPECIFIED: Primary | ICD-10-CM

## 2019-01-11 RX ORDER — FAMOTIDINE 20 MG/1
TABLET, FILM COATED ORAL
Qty: 30 TABLET | Refills: 11 | Status: SHIPPED | OUTPATIENT
Start: 2019-01-11 | End: 2019-06-06

## 2019-01-11 NOTE — TELEPHONE ENCOUNTER
Chart review shows that patient was last seen on 12/11/18. Rx as requested is noted in office visit notes on that date with no changes and patient was to follow up on an as needed basis. Writer will refill Rx as requested.    Prescription refilled per RN Medication Refill Policy..................Anastacio Alfaro 1/11/2019 2:32 PM

## 2019-02-12 ENCOUNTER — OFFICE VISIT (OUTPATIENT)
Dept: FAMILY MEDICINE | Facility: OTHER | Age: 24
End: 2019-02-12
Attending: NURSE PRACTITIONER
Payer: COMMERCIAL

## 2019-02-12 VITALS
HEART RATE: 95 BPM | DIASTOLIC BLOOD PRESSURE: 98 MMHG | BODY MASS INDEX: 30.46 KG/M2 | SYSTOLIC BLOOD PRESSURE: 138 MMHG | OXYGEN SATURATION: 96 % | WEIGHT: 188.7 LBS | TEMPERATURE: 100 F

## 2019-02-12 DIAGNOSIS — H66.002 ACUTE SUPPURATIVE OTITIS MEDIA OF LEFT EAR WITHOUT SPONTANEOUS RUPTURE OF TYMPANIC MEMBRANE, RECURRENCE NOT SPECIFIED: Primary | ICD-10-CM

## 2019-02-12 PROCEDURE — 99214 OFFICE O/P EST MOD 30 MIN: CPT | Performed by: PHYSICIAN ASSISTANT

## 2019-02-12 PROCEDURE — G0463 HOSPITAL OUTPT CLINIC VISIT: HCPCS

## 2019-02-12 RX ORDER — AMOXICILLIN 875 MG
875 TABLET ORAL 2 TIMES DAILY
Qty: 20 TABLET | Refills: 0 | Status: SHIPPED | OUTPATIENT
Start: 2019-02-12 | End: 2019-03-06

## 2019-02-12 NOTE — PATIENT INSTRUCTIONS
Middle ear infection - left ear  Start amoxicillin 875 mg oral tablet, take twice daily for 10 days  Water precautions, do not submerge head in pool or tub until symptoms are resolved and medication is completed.   Rest and fluids  Ibuprofen or tylenol as needed for discomfort or fever  For cough - humidified air, warm fluids, honey, throat lozenges, OTC robitussin  Return to clinic if symptoms persist or worsen  Seek immediate care for    Ear pain gets worse or does not improve after 3 days of treatment    Unusual drowsiness or confusion    Neck pain, stiff neck, or headache    Fluid or blood draining from the ear canal    Fever of 100.4 F (38 C) or as advised     Seizure      Patient Education     Middle Ear Infection (Adult)  You have an infection of the middle ear, the space behind the eardrum. This is also called acute otitis media (AOM). Sometimes it is caused by the common cold. This is because congestion can block the internal passage (eustachian tube) that drains fluid from the middle ear. When the middle ear fills with fluid, bacteria can grow there and cause an infection. Oral antibiotics are used to treat this illness, not ear drops. Symptoms usually start to improve within 1 to 2 days of treatment.    Home care  The following are general care guidelines:    Finish all of the antibiotic medicine given, even though you may feel better after the first few days.    You may use over-the-counter medicine, such as acetaminophen or ibuprofen, to control pain and fever, unless something else was prescribed. If you have chronic liver or kidney disease or have ever had a stomach ulcer or gastrointestinal bleeding, talk with your healthcare provider before using these medicines. Do not give aspirin to anyone under 18 years of age who has a fever. It may cause severe illness or death.  Follow-up care  Follow up with your healthcare provider, or as advised, in 2 weeks if all symptoms have not gotten better, or if  hearing doesn't go back to normal within 1 month.  When to seek medical advice  Call your healthcare provider right away if any of these occur:    Ear pain gets worse or does not improve after 3 days of treatment    Unusual drowsiness or confusion    Neck pain, stiff neck, or headache    Fluid or blood draining from the ear canal    Fever of 100.4 F (38 C) or as advised     Seizure  Date Last Reviewed: 6/1/2016 2000-2018 [employer]. [address] [place] [address], [place] [address]. All rights reserved. This information is not intended as a substitute for professional medical care. Always follow your healthcare professional's instructions.

## 2019-02-12 NOTE — NURSING NOTE
Chief Complaint   Patient presents with     Ear Problem     left      Medication Reconciliation: complete     Patient is here due to left ear pain. Patient stated that it started a couple days ago with his throat, then his ear pain started this morning. Patient does have a slight cough and he does have constant pressure in his ear.    Carrie Roca, CMA

## 2019-02-12 NOTE — PROGRESS NOTES
SUBJECTIVE:  Phi Duncan is a 23 year old male presents to [facility] for evaluation of left ear pain. Onset this AM, course is waxing and waning. Currently 4/5 out of 10 for pain. It was worse when he woke up and a few hours PTA  Associated symptoms: mild runny nose and cough - onset a few days ago. Sore throat    OM history:  A few prior infections  Water exposures - negative  Treatments: nothing today    Past Medical History:   Diagnosis Date     Attention-deficit hyperactivity disorder     No Comments Provided     Major depressive disorder, single episode     No Comments Provided     Personal history of other medical treatment (CODE)     No Comments Provided     Sleep deprivation     No Comments Provided     Current Outpatient Medications   Medication     famotidine (PEPCID) 20 MG tablet     No current facility-administered medications for this visit.       No Known Allergies    ROS  General: feels well, no fever  HENT: POSITIVE per HPI  Respiratory POSITIVE - mild cough  Abdomen - negative  Skin - negative    OBJECTIVE:  Vitals:    02/12/19 1634 02/12/19 1636   BP: 160/90 (!) 138/98   Pulse: 95    Temp: 100  F (37.8  C)    SpO2: 96%    Weight: 85.6 kg (188 lb 11.2 oz)      General appearance: healthy, alert and NAD.    Eye: no injection or drainage  Ears: abnormal: R TM moderate effusion; L TM erythematous and bulging. Canals clear bilaterally without drainage or erythema.   Nose: clear rhinorrhea  Oropharynx: mild erythema  Neck: normal, supple and no adenopathy  Cardiac: normal RR, no murmur  Lungs: normal respiration, clear to ausculation  Skin: no rash    ASSESSMENT:  (H66.002) Acute suppurative otitis media of left ear without spontaneous rupture of tympanic membrane, recurrence not specified  (primary encounter diagnosis)    Plan: amoxicillin (AMOXIL) 875 MG tablet    RX per EPIC   Discussed symptomatic treatments per AVS  Follow up with PCP if symptoms persist or worsen  Patient received verbal and  written instruction including review of warning signs & follow up    Karla Canseco PA-C on 2/12/2019 at 4:53 PM

## 2019-03-06 ENCOUNTER — OFFICE VISIT (OUTPATIENT)
Dept: FAMILY MEDICINE | Facility: OTHER | Age: 24
End: 2019-03-06
Attending: PHYSICIAN ASSISTANT
Payer: COMMERCIAL

## 2019-03-06 VITALS
HEIGHT: 66 IN | BODY MASS INDEX: 31.11 KG/M2 | DIASTOLIC BLOOD PRESSURE: 66 MMHG | TEMPERATURE: 99 F | RESPIRATION RATE: 16 BRPM | HEART RATE: 80 BPM | SYSTOLIC BLOOD PRESSURE: 104 MMHG | WEIGHT: 193.56 LBS | OXYGEN SATURATION: 95 %

## 2019-03-06 DIAGNOSIS — J11.1 INFLUENZA-LIKE ILLNESS: Primary | ICD-10-CM

## 2019-03-06 PROCEDURE — G0463 HOSPITAL OUTPT CLINIC VISIT: HCPCS

## 2019-03-06 PROCEDURE — 99214 OFFICE O/P EST MOD 30 MIN: CPT | Performed by: PHYSICIAN ASSISTANT

## 2019-03-06 RX ORDER — OSELTAMIVIR PHOSPHATE 75 MG/1
75 CAPSULE ORAL 2 TIMES DAILY
Qty: 10 CAPSULE | Refills: 0 | Status: SHIPPED | OUTPATIENT
Start: 2019-03-06 | End: 2019-04-09

## 2019-03-06 ASSESSMENT — MIFFLIN-ST. JEOR: SCORE: 1805.74

## 2019-03-06 NOTE — PATIENT INSTRUCTIONS
Influenza like illness  Tamiflu 75 mg oral tablet, twice daily x 5 days  Rest and fluids  Symptomatic treatments for cough symptoms  Humidifier, vicks vapor rub, honey, OTC mucinex  For sinus congestion: OTC decongestants, nasal sprays, mucinex. Warm compress or hot steamy shower  Follow up with PCP in 1 week, sooner for worsening  Seek immediate care for    Cough with lots of colored mucus (sputum) or blood in your mucus    Chest pain, shortness of breath, wheezing, or trouble breathing    Severe headache, or face, neck, or ear pain    New rash with fever    Fever of 100.4 F (38 C) or higher, or as directed by your healthcare provider    Confusion, behavior change, or seizure    Severe weakness or dizziness    You get a new fever or cough after getting better for a few days    Patient Education     Influenza (Adult)    Influenza is also called the flu. It is a viral illness that affects the air passages of your lungs. It is different from the common cold. The flu can easily be passed from one to person to another. It may be spread through the air by coughing and sneezing. Or it can be spread by touching the sick person and then touching your own eyes, nose, or mouth.  The flu starts 1 to 3 days after you are exposed to the flu virus. It may last for 1 to 2 weeks but many people feel tired or fatigued for many weeks afterward. You usually don t need to take antibiotics unless you have a complication. This might be an ear or sinus infection or pneumonia.  Symptoms of the flu may be mild or severe. They can include extreme tiredness (wanting to stay in bed all day), chills, fevers, muscle aches, soreness with eye movement, headache, and a dry, hacking cough.  Home care  Follow these guidelines when caring for yourself at home:    Avoid being around cigarette smoke, whether yours or other people s.    Acetaminophen or ibuprofen will help ease your fever, muscle aches, and headache. Don t give aspirin to anyone younger  than 18 who has the flu. Aspirin can harm the liver.    Nausea and loss of appetite are common with the flu. Eat light meals. Drink 6 to 8 glasses of liquids every day. Good choices are water, sport drinks, soft drinks without caffeine, juices, tea, and soup. Extra fluids will also help loosen secretions in your nose and lungs.    Over-the-counter cold medicines will not make the flu go away faster. But the medicines may help with coughing, sore throat, and congestion in your nose and sinuses. Don t use a decongestant if you have high blood pressure.    Stay home until your fever has been gone for at least 24 hours without using medicine to reduce fever.  Follow-up care  Follow up with your healthcare provider, or as advised, if you are not getting better over the next week.  If you are age 65 or older, talk with your provider about getting a pneumococcal vaccine every 5 years. You should also get this vaccine if you have chronic asthma or COPD. All adults should get a flu vaccine every fall. Ask your provider about this.  When to seek medical advice  Call your healthcare provider right away if any of these occur:    Cough with lots of colored mucus (sputum) or blood in your mucus    Chest pain, shortness of breath, wheezing, or trouble breathing    Severe headache, or face, neck, or ear pain    New rash with fever    Fever of 100.4 F (38 C) or higher, or as directed by your healthcare provider    Confusion, behavior change, or seizure    Severe weakness or dizziness    You get a new fever or cough after getting better for a few days  Date Last Reviewed: 1/1/2017 2000-2018 [employer]. [address] [place] [address], [place] [address]. All rights reserved. This information is not intended as a substitute for professional medical care. Always follow your healthcare professional's instructions.

## 2019-03-06 NOTE — PROGRESS NOTES
".  SUBJECTIVE:  Phi Duncan is a 23 year old male who presents to the clinic today with a mild productive cough, runny nose, sneezing, congestion, fever with max 100.5.   Onset 2 days ago, course is unchanged.   Associated symptoms: headache, body aches    Treatments - tylenol 2 hours PTA  Exposures - not known   History of asthma and environmental allergies is - negative  Tobacco use - 4-5 cigarettes per day, has cut back - working on quitting  Eating and drinking normally  Normal urine and stool      Past Medical History:   Diagnosis Date     Attention-deficit hyperactivity disorder     No Comments Provided     Major depressive disorder, single episode     No Comments Provided     Personal history of other medical treatment (CODE)     No Comments Provided     Sleep deprivation     No Comments Provided      Social History     Tobacco Use     Smoking status: Current Every Day Smoker     Packs/day: 0.25     Years: 8.00     Pack years: 2.00     Types: Cigarettes     Smokeless tobacco: Never Used     Tobacco comment: Quit smoking: trying to quit   Substance Use Topics     Alcohol use: No     Alcohol/week: 0.0 oz     Current Outpatient Medications   Medication     famotidine (PEPCID) 20 MG tablet     No current facility-administered medications for this visit.       No Known Allergies      ROS  General - fatigue, fever  HENT - POSITIVE per HPI  Respiratory - POSITIVE - per HPI  Abdomen - negative  Skin - negative      OBJECTIVE:  Exam:  Vitals:    03/06/19 1038   BP: 104/66   BP Location: Right arm   Patient Position: Sitting   Cuff Size: Adult Regular   Pulse: 80   Resp: 16   Temp: 99  F (37.2  C)   TempSrc: Tympanic   SpO2: 95%   Weight: 87.8 kg (193 lb 9 oz)   Height: 1.676 m (5' 6\")     General: healthy, alert and no distress, vital noted - febrile, mild distress  Head: NORMAL - atraumatic, nontender.  Ears: TM on R mild effusion, TM on L mild effusion - pink, no bulging or drainage  Eyes: NORMAL - no injection no " discharge, no periorbital swelling.  Nose: ABNORMAL - swollen nasal turbinates. No sinus tenderness  Neck: supple, non-tender, free range of motion, no adenopathy  Throat: ABNORMAL - mild erythema.  Resp: Normal - Clear to auscultation without rales, rhonchi, or wheezing.  Cardiac: NORMAL - regular rate and rhythm without murmur.      ASSESSMENT:    (R69) Influenza-like illness  (primary encounter diagnosis)    Plan: oseltamivir (TAMIFLU) 75 MG capsule    Discussed lack of efficacy to test for flu today as patient presents with flu like symptoms.  Discussed risks/benefits of Tamiflu. Patient would like to treat.   Tamiflu 75 mg oral tablet, twice daily x 5 days  Symptomatic treatments per AVS  Discussed warning signs and follow up  Patient received verbal and written instructions    Karla Canseco PA-C on 3/6/2019 at 11:16 AM

## 2019-03-06 NOTE — NURSING NOTE
Patient presents to the clinic for fever, productive cough and runny nose x 2 days. Highest recorded temperature was 100.5. He has taken tylenol for treatment.  Medication Reconciliation: complete    Mira Mcarthur, CMA

## 2019-04-09 ENCOUNTER — OFFICE VISIT (OUTPATIENT)
Dept: FAMILY MEDICINE | Facility: OTHER | Age: 24
End: 2019-04-09
Attending: NURSE PRACTITIONER
Payer: COMMERCIAL

## 2019-04-09 VITALS
SYSTOLIC BLOOD PRESSURE: 118 MMHG | BODY MASS INDEX: 31.89 KG/M2 | DIASTOLIC BLOOD PRESSURE: 80 MMHG | TEMPERATURE: 97.4 F | HEART RATE: 81 BPM | HEIGHT: 65 IN | OXYGEN SATURATION: 97 % | WEIGHT: 191.4 LBS | RESPIRATION RATE: 16 BRPM

## 2019-04-09 DIAGNOSIS — R68.89 INFLUENZA-LIKE SYMPTOMS: Primary | ICD-10-CM

## 2019-04-09 DIAGNOSIS — R07.0 THROAT PAIN: ICD-10-CM

## 2019-04-09 DIAGNOSIS — Z01.89 PATIENT REQUEST FOR DIAGNOSTIC TESTING: ICD-10-CM

## 2019-04-09 LAB
DEPRECATED S PYO AG THROAT QL EIA: NORMAL
SPECIMEN SOURCE: NORMAL

## 2019-04-09 PROCEDURE — 99213 OFFICE O/P EST LOW 20 MIN: CPT | Performed by: NURSE PRACTITIONER

## 2019-04-09 PROCEDURE — G0463 HOSPITAL OUTPT CLINIC VISIT: HCPCS

## 2019-04-09 PROCEDURE — 87880 STREP A ASSAY W/OPTIC: CPT | Performed by: NURSE PRACTITIONER

## 2019-04-09 RX ORDER — CYCLOBENZAPRINE HCL 10 MG
TABLET ORAL
Refills: 3 | COMMUNITY
Start: 2019-03-31 | End: 2019-06-06

## 2019-04-09 ASSESSMENT — MIFFLIN-ST. JEOR: SCORE: 1780.06

## 2019-04-09 ASSESSMENT — PAIN SCALES - GENERAL: PAINLEVEL: MODERATE PAIN (4)

## 2019-04-09 NOTE — NURSING NOTE
Patient presents in the clinic with concerns of a sore throat, itching in his right ear, and chest tightness with inhalation. Symptoms all began 2 days ago. Denies any exposure to anyone with a known sickness.   Nancy Enriquez LPN 4/9/2019 10:10 AM

## 2019-04-09 NOTE — PROGRESS NOTES
HPI:    Phi Duncan is a 23 year old male  who presents to clinic today for URI symptoms.    Symptoms for 2 days.  Symptoms include sore throat, mild congested cough, chest congestion, chest tightness, right ear with itching and pain in canal.  Painful to swallow.  Feels like a pimple in his right ear canal.  Denies runny or stuffy nose.  No fevers.  No chills or sweats.  Splitting severe headaches initially, resolved now.  Body aches initially resolved.  Appetite decreased initially states he good only eat a piece of toast due to vomiting but then was able to increase to soups, slowing improving but still painful to swallow.  Mild infrequent diarrhea.  No abdominal pain.  Decreased energy, minimal, down in bed.      Took tylenol for symptoms.  No cough or cold medications.    Using cough drops.    No flu shot.      Past Medical History:   Diagnosis Date     Attention-deficit hyperactivity disorder     No Comments Provided     Major depressive disorder, single episode     No Comments Provided     Personal history of other medical treatment (CODE)     No Comments Provided     Sleep deprivation     No Comments Provided     Past Surgical History:   Procedure Laterality Date     OTHER SURGICAL HISTORY      [id number],NO PREVIOUS SURGERY     Social History     Tobacco Use     Smoking status: Current Every Day Smoker     Packs/day: 0.50     Years: 8.00     Pack years: 4.00     Types: Cigarettes     Smokeless tobacco: Never Used     Tobacco comment: Quit smoking: trying to quit   Substance Use Topics     Alcohol use: Yes     Alcohol/week: 0.0 oz     Comment: occasionally      Current Outpatient Medications   Medication Sig Dispense Refill     cyclobenzaprine (FLEXERIL) 10 MG tablet TK 1 T PO TID PRN FOR MUSCLE SPASMS  3     famotidine (PEPCID) 20 MG tablet TAKE 1 TABLET BY MOUTH ONCE DAILY IF NEEDED 30 tablet 11     No Known Allergies      Past medical history, past surgical history, current medications and allergies  "reviewed and accurate to the best of my knowledge.        ROS:  Refer to HPI    /80 (BP Location: Left arm, Patient Position: Sitting, Cuff Size: Adult Regular)   Pulse 81   Temp 97.4  F (36.3  C) (Tympanic)   Resp 16   Ht 1.651 m (5' 5\")   Wt 86.8 kg (191 lb 6.4 oz)   SpO2 97%   BMI 31.85 kg/m      EXAM:  General Appearance: Well appearing adult male, non ill appearance, appropriate appearance for age. No acute distress  Head: normocephalic, atraumatic  Ears: Left TM grey, translucent with bony landmarks appreciated, no erythema, no effusion, no bulging, no purulence.  Right TM grey, translucent with bony landmarks appreciated, no erythema, no effusion, no bulging, no purulence.  Left auditory canal clear.  Right auditory canal clear.  Normal external ears, non tender.  Eyes: conjunctivae normal without erythema or irritation, no drainage or crusting, no eyelid swelling, pupils equal   Orophayrnx: moist mucous membranes, posterior pharynx with diffuse bright erythema, tonsils with 1+ hypertrophy, bright erythema, no exudates or petechiae, tonsils with ulcers, no post nasal drip seen, no trismus, voice clear.    Nose:  no drainage or congestion noted  Neck: diffuse tonsillar and anterior cervical lymph nodes on palpation   Respiratory: normal chest wall and respirations.  Normal effort.  Clear to auscultation bilaterally, no wheezing, crackles or rhonchi.  No increased work of breathing.  No cough appreciated, oxygen saturation 97%  Cardiac: RRR with no murmurs  Musculoskeletal:  Normal gait.  Equal movement of bilateral upper extremities.  Equal movement of bilateral lower extremities.    Psychological: normal affect, alert and pleasant      Labs:  Results for orders placed or performed in visit on 04/09/19   Rapid strep screen   Result Value Ref Range    Specimen Description Throat     Rapid Strep A Screen       Negative presumptive for Group A Beta Streptococcus             ASSESSMENT/PLAN:  1. " Throat pain    - Rapid strep screen    Negative rapid strep test     2. Patient request for diagnostic testing    - Rapid strep screen    Negative rapid strep test     3. Influenza-like symptoms    No fevers.  Symptoms x 2 days, improving.  No indications for testing or antiviral treatment at this time.    Discussed symptomatic treatment.    Encouraged fluids  Symptomatic treatment - salt water gargles, honey, elevation, humidifier, sinus rinse/netti pot, lozenges, rest, etc     OTC - Tylenol or ibuprofen PRN  OTC - cough or cold medication PRN     Discussed warning signs/symptoms indicative of need to f/u    Follow up if symptoms persist or worsen or concerns

## 2019-05-15 ENCOUNTER — OFFICE VISIT (OUTPATIENT)
Dept: FAMILY MEDICINE | Facility: OTHER | Age: 24
End: 2019-05-15
Attending: FAMILY MEDICINE
Payer: MEDICAID

## 2019-05-15 VITALS
WEIGHT: 189.13 LBS | RESPIRATION RATE: 15 BRPM | HEIGHT: 65 IN | DIASTOLIC BLOOD PRESSURE: 78 MMHG | HEART RATE: 78 BPM | TEMPERATURE: 98.5 F | SYSTOLIC BLOOD PRESSURE: 104 MMHG | BODY MASS INDEX: 31.51 KG/M2

## 2019-05-15 DIAGNOSIS — Q86.0 FETAL ALCOHOL SYNDROME: ICD-10-CM

## 2019-05-15 DIAGNOSIS — L60.0 INGROWN TOENAIL: Primary | ICD-10-CM

## 2019-05-15 PROCEDURE — 11730 AVULSION NAIL PLATE SIMPLE 1: CPT | Performed by: FAMILY MEDICINE

## 2019-05-15 PROCEDURE — G0463 HOSPITAL OUTPT CLINIC VISIT: HCPCS

## 2019-05-15 ASSESSMENT — MIFFLIN-ST. JEOR: SCORE: 1769.75

## 2019-05-15 ASSESSMENT — PAIN SCALES - GENERAL: PAINLEVEL: NO PAIN (0)

## 2019-05-15 NOTE — NURSING NOTE
Patient presents to clinic today for bilateral ingrown great toenails. He states they are not painful at this time.     No LMP for male patient.  Medication Reconciliation: complete    Camila Craty LPN  5/15/2019 2:13 PM

## 2019-05-16 PROBLEM — Q86.0 FETAL ALCOHOL SYNDROME: Status: ACTIVE | Noted: 2019-05-16

## 2019-05-16 PROBLEM — R25.2 HAND CRAMPS: Status: RESOLVED | Noted: 2018-12-08 | Resolved: 2019-05-16
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
## 2019-05-16 NOTE — PROGRESS NOTES
"  SUBJECTIVE:   Phi Duncan is a 23 year old male who presents to clinic today for the following health issues: Ingrown toenail    Patient arrives here for an ingrown toenail.  States he has had it removed once and would like it removed again.  I did discuss about ablation patient at this time is not interested in pursuing this        Patient Active Problem List    Diagnosis Date Noted     Hand cramps 2018     Priority: Medium     Abdominal pain, left upper quadrant 2018     Priority: Medium     ADHD 2018     Priority: Medium     Overview:   also FAES (per patient)       Dog bite 2018     Priority: Medium     Gastroesophageal reflux disease 2017     Priority: Medium     Disorder of bursae and tendons in shoulder region 2012     Priority: Medium     Other nonallopathic lesion of cervical region 2012     Priority: Medium     Nonallopathic lesion of thoracic region 2012     Priority: Medium     Constipation 2011     Priority: Medium     Viral warts 2011     Priority: Medium     Alcohol affecting fetus or  via placenta or breast milk 2010     Priority: Medium     No Known Allergies    Review of Systems     OBJECTIVE:     /78 (BP Location: Right arm, Patient Position: Sitting, Cuff Size: Adult Regular)   Pulse 78   Temp 98.5  F (36.9  C) (Tympanic)   Resp 15   Ht 1.651 m (5' 5\")   Wt 85.8 kg (189 lb 2 oz)   BMI 31.47 kg/m    Body mass index is 31.47 kg/m .  Physical Exam   Constitutional: He appears well-developed.   HENT:   Head: Normocephalic.   Right Ear: External ear normal.   Musculoskeletal:   Patient has bilateral ingrown toenails with the left worse than the right.   Skin: Skin is warm.       none     ASSESSMENT/PLAN:         1. Ingrown toenail  Patient's identity procedure confirmed with the patient.  Informed consent obtained.  Area was prepped.  Digital block obtained with 2% lidocaine.  The ingrown nail was excised with a " [place] blade.  A  was used and it was subsequently removed.  Patient tolerated it well.  - REMOVAL OF NAIL PLATE SIMPLE SINGLE    2. Fetal alcohol syndrome          Rogelio Parsons MD  [facility]

## 2019-06-06 ENCOUNTER — OFFICE VISIT (OUTPATIENT)
Dept: FAMILY MEDICINE | Facility: OTHER | Age: 24
End: 2019-06-06
Attending: FAMILY MEDICINE
Payer: MEDICAID

## 2019-06-06 VITALS
TEMPERATURE: 97.9 F | SYSTOLIC BLOOD PRESSURE: 120 MMHG | WEIGHT: 186.2 LBS | DIASTOLIC BLOOD PRESSURE: 92 MMHG | BODY MASS INDEX: 30.99 KG/M2 | HEART RATE: 72 BPM | RESPIRATION RATE: 12 BRPM

## 2019-06-06 DIAGNOSIS — K21.9 GASTROESOPHAGEAL REFLUX DISEASE, ESOPHAGITIS PRESENCE NOT SPECIFIED: ICD-10-CM

## 2019-06-06 DIAGNOSIS — M62.830 BACK MUSCLE SPASM: Primary | ICD-10-CM

## 2019-06-06 PROCEDURE — G0463 HOSPITAL OUTPT CLINIC VISIT: HCPCS

## 2019-06-06 PROCEDURE — 99214 OFFICE O/P EST MOD 30 MIN: CPT | Performed by: FAMILY MEDICINE

## 2019-06-06 RX ORDER — FAMOTIDINE 20 MG/1
TABLET, FILM COATED ORAL
Qty: 90 TABLET | Refills: 4 | Status: SHIPPED | OUTPATIENT
Start: 2019-06-06 | End: 2020-08-18

## 2019-06-06 RX ORDER — CYCLOBENZAPRINE HCL 10 MG
10 TABLET ORAL 2 TIMES DAILY PRN
Qty: 180 TABLET | Refills: 4 | Status: SHIPPED | OUTPATIENT
Start: 2019-06-06 | End: 2022-05-25

## 2019-06-06 ASSESSMENT — PATIENT HEALTH QUESTIONNAIRE - PHQ9: SUM OF ALL RESPONSES TO PHQ QUESTIONS 1-9: 7

## 2019-06-06 ASSESSMENT — PAIN SCALES - GENERAL: PAINLEVEL: NO PAIN (0)

## 2019-06-06 NOTE — NURSING NOTE
Patient here to have form for  animal completed and to establish care.  Concha Bergeron............................... 6/6/2019 2:55 PM  Medication Reconciliation: complete    Concha Fernandes LPN

## 2019-06-06 NOTE — PROGRESS NOTES
Nursing Notes:   Concha Fernandes LPN  6/6/2019  2:59 PM  Signed  Patient here to have form for  animal completed and to establish care.  Concha Bergeron............................... 6/6/2019 2:55 PM  Medication Reconciliation: complete  Concha Fernandes LPN      SUBJECTIVE:   Phi Duncan is a 23 year old male who presents to clinic today for the following health issues:    HPI  Phi is here for establishing care.  His main concerns today are:  1. Mood.  Has a history of depression, utilizing a  animal and has for 3 years.  He is requesting forms filled out for his current apartment for his two orange cats: Ravi and David.  He does not follow with a psychiatrist; nor does he currently take any medications for his mood.  2. Has a history of GERD, well controlled with prn pepcid.  Drinks alcohol 1x/month; tries to limit this due to his father being an alcoholic.  Is aware of other trigger foods - tomato products, caffeine, etc.  3. Needing a refill of his flexeril.  Has mid-back spasms chronically, worse with lying flat.  Was using marijuana for it; but has a new baby at home.  Is getting by with flexeril with relief.      No other concerns today.    Patient Active Problem List    Diagnosis Date Noted     Fetal alcohol syndrome 05/16/2019     Priority: Medium     Abdominal pain, left upper quadrant 12/08/2018     Priority: Medium     ADHD 02/01/2018     Priority: Medium     Overview:   also FAANGELY (per patient)       Gastroesophageal reflux disease 08/23/2017     Priority: Medium     Other nonallopathic lesion of cervical region 08/17/2012     Priority: Medium     Nonallopathic lesion of thoracic region 08/17/2012     Priority: Medium     Constipation 07/14/2011     Priority: Medium     Viral warts 04/29/2011     Priority: Medium     Past Medical History:   Diagnosis Date     Attention-deficit hyperactivity disorder     No Comments Provided     Major depressive disorder, single  episode     No Comments Provided     Personal history of other medical treatment (CODE)     No Comments Provided     Sleep deprivation     No Comments Provided      Past Surgical History:   Procedure Laterality Date     OTHER SURGICAL HISTORY      [id number],NO PREVIOUS SURGERY     Family History   Problem Relation Age of Onset     Diabetes Mother         Diabetes     Cancer Mother         Cancer,Unsure type- in pelvis     Family History Negative Father         Good Health     Social History     Tobacco Use     Smoking status: Current Every Day Smoker     Packs/day: 0.50     Years: 8.00     Pack years: 4.00     Types: Cigarettes     Smokeless tobacco: Never Used     Tobacco comment: Quit smoking: trying to quit   Substance Use Topics     Alcohol use: Yes     Alcohol/week: 0.0 oz     Comment: occasionally      Social History     Social History Narrative    ** Merged History Encounter **         ** Data from: 4/24/13 Enc Dept: [facility]    Previously in residential care at [facility]. He reports he got there by stealing an Ipod.  Currently living in a foster home on [facility].  He loves to fish.         ** Data from: 1/15/17 Enc Dept: [facility] EMERGENCY DEPARTMENT    Works at [employer]     Current Outpatient Medications   Medication Sig Dispense Refill     cyclobenzaprine (FLEXERIL) 10 MG tablet TK 1 T PO TID PRN FOR MUSCLE SPASMS  3     famotidine (PEPCID) 20 MG tablet TAKE 1 TABLET BY MOUTH ONCE DAILY IF NEEDED 30 tablet 11     No Known Allergies      Review of Systems   Constitutional: Negative for chills and fever.   HENT: Negative for congestion, ear pain, hearing loss and sore throat.    Eyes: Negative for pain and visual disturbance.   Respiratory: Negative for cough and shortness of breath.    Cardiovascular: Negative for chest pain, palpitations and peripheral edema.   Gastrointestinal: Negative for abdominal pain, constipation, diarrhea, heartburn, hematochezia and nausea.   Genitourinary: Negative  for discharge, dysuria, frequency, genital sores, hematuria, impotence and urgency.   Musculoskeletal: Negative for arthralgias, joint swelling and myalgias.   Skin: Negative for rash.   Neurological: Negative for dizziness, weakness, headaches and paresthesias.   Psychiatric/Behavioral: Negative for mood changes. The patient is not nervous/anxious.         OBJECTIVE:     BP (!) 120/92 (BP Location: Right arm, Patient Position: Sitting, Cuff Size: Adult Large)   Pulse 72   Temp 97.9  F (36.6  C) (Tympanic)   Resp 12   Wt 84.5 kg (186 lb 3.2 oz)   BMI 30.99 kg/m    Body mass index is 30.99 kg/m .  Physical Exam   Constitutional: He is oriented to person, place, and time. He appears well-developed and well-nourished. No distress.   HENT:   Right Ear: Tympanic membrane and external ear normal.   Left Ear: Tympanic membrane and external ear normal.   Nose: Nose normal.   Mouth/Throat: Oropharynx is clear and moist. No oral lesions. No oropharyngeal exudate.   Eyes: Pupils are equal, round, and reactive to light. Conjunctivae are normal. Right eye exhibits no discharge. Left eye exhibits no discharge.   Neck: Neck supple. No tracheal deviation present. No thyromegaly present.   Cardiovascular: Normal rate, regular rhythm, S1 normal, S2 normal, normal heart sounds and normal pulses. Exam reveals no S3 and no S4.   No murmur heard.  Pulmonary/Chest: Effort normal and breath sounds normal. No respiratory distress. He has no wheezes. He has no rales.   Abdominal: Soft. Bowel sounds are normal. He exhibits no mass. There is no hepatosplenomegaly. There is no tenderness.   Musculoskeletal: Normal range of motion. He exhibits no edema or deformity.   Lymphadenopathy:     He has no cervical adenopathy.   Neurological: He is alert and oriented to person, place, and time. He has normal strength and normal reflexes. He exhibits normal muscle tone.   Skin: Skin is warm and dry. No lesion and no rash noted.   Psychiatric: He has  a normal mood and affect. His speech is normal. Judgment and thought content normal. Cognition and memory are normal.     Diagnostic Test Results:  none     ASSESSMENT/PLAN:     1. Gastroesophageal reflux disease, esophagitis presence not specified  Chronic, stable.  Refilled Pepcid at current dose.  - famotidine (PEPCID) 20 MG tablet; TAKE 1 TABLET BY MOUTH ONCE DAILY IF NEEDED  Dispense: 90 tablet; Refill: 4    2. Back muscle spasm  Chronic, stable.  Refilled flexeril at current dose.  If worsening occurs, consider PT.  Discussed importance of regular exercise/core strengthening.  - cyclobenzaprine (FLEXERIL) 10 MG tablet; Take 1 tablet (10 mg) by mouth 2 times daily as needed for muscle spasms  Dispense: 180 tablet; Refill: 4    3. Depression/mood   animal forms filled out.      Yomaira Faulkner, [facility]

## 2019-06-07 ASSESSMENT — ENCOUNTER SYMPTOMS
CHILLS: 0
NERVOUS/ANXIOUS: 0
DIZZINESS: 0
HEARTBURN: 0
COUGH: 0
ARTHRALGIAS: 0
ABDOMINAL PAIN: 0
WEAKNESS: 0
DIARRHEA: 0
HEMATURIA: 0
DYSURIA: 0
NAUSEA: 0
SORE THROAT: 0
SHORTNESS OF BREATH: 0
MYALGIAS: 0
JOINT SWELLING: 0
HEMATOCHEZIA: 0
FEVER: 0
HEADACHES: 0
PARESTHESIAS: 0
CONSTIPATION: 0
FREQUENCY: 0
PALPITATIONS: 0
EYE PAIN: 0

## 2019-06-14 ENCOUNTER — TRANSFERRED RECORDS (OUTPATIENT)
Dept: HEALTH INFORMATION MANAGEMENT | Facility: OTHER | Age: 24
End: 2019-06-14

## 2019-09-21 ENCOUNTER — HOSPITAL ENCOUNTER (EMERGENCY)
Facility: OTHER | Age: 24
Discharge: LEFT BEFORE DISCHARGE | End: 2019-09-21
Payer: COMMERCIAL

## 2019-09-21 VITALS
OXYGEN SATURATION: 94 % | TEMPERATURE: 97.8 F | RESPIRATION RATE: 16 BRPM | BODY MASS INDEX: 31.05 KG/M2 | HEIGHT: 66 IN | SYSTOLIC BLOOD PRESSURE: 124 MMHG | WEIGHT: 193.2 LBS | DIASTOLIC BLOOD PRESSURE: 60 MMHG

## 2019-09-21 ASSESSMENT — MIFFLIN-ST. JEOR: SCORE: 1804.1

## 2019-09-22 NOTE — ED TRIAGE NOTES
Pt arrives to the ED via private car.  Pt states that he has an ingrown toenail on the left great toe.  Pt states this has happened before and now it is growing downward.  Pt states the pain is a 9/10.  Pt has not taken any OTC for this.

## 2019-09-28 ENCOUNTER — HOSPITAL ENCOUNTER (EMERGENCY)
Facility: OTHER | Age: 24
Discharge: STILL ADMITTED OR ADMINISTRATIVE | End: 2019-09-28
Attending: PHYSICIAN ASSISTANT | Admitting: PHYSICIAN ASSISTANT
Payer: COMMERCIAL

## 2019-09-28 ENCOUNTER — OFFICE VISIT (OUTPATIENT)
Dept: FAMILY MEDICINE | Facility: OTHER | Age: 24
End: 2019-09-28
Attending: PHYSICIAN ASSISTANT
Payer: COMMERCIAL

## 2019-09-28 VITALS
RESPIRATION RATE: 16 BRPM | WEIGHT: 193.6 LBS | OXYGEN SATURATION: 97 % | HEIGHT: 67 IN | DIASTOLIC BLOOD PRESSURE: 60 MMHG | BODY MASS INDEX: 30.39 KG/M2 | HEART RATE: 83 BPM | SYSTOLIC BLOOD PRESSURE: 122 MMHG | TEMPERATURE: 97.9 F

## 2019-09-28 DIAGNOSIS — L60.0 INGROWN TOENAIL OF LEFT FOOT WITH INFECTION: Primary | ICD-10-CM

## 2019-09-28 PROCEDURE — G0463 HOSPITAL OUTPT CLINIC VISIT: HCPCS

## 2019-09-28 PROCEDURE — 99214 OFFICE O/P EST MOD 30 MIN: CPT | Performed by: FAMILY MEDICINE

## 2019-09-28 RX ORDER — CEPHALEXIN 500 MG/1
500 CAPSULE ORAL 2 TIMES DAILY
Qty: 30 CAPSULE | Refills: 0 | Status: SHIPPED | OUTPATIENT
Start: 2019-09-28 | End: 2019-10-11

## 2019-09-28 ASSESSMENT — MIFFLIN-ST. JEOR: SCORE: 1813.85

## 2019-09-28 ASSESSMENT — PAIN SCALES - GENERAL: PAINLEVEL: SEVERE PAIN (6)

## 2019-09-28 NOTE — PROGRESS NOTES
CC: Left large toenail discomfort    HPI: 23-year-old white male has had problems with ingrown toenail on the left large toe that is again bothering him.  He had a previous procedure done under sterile as documented in the chart to be on or around May.  Over the last week or more he has had more swelling and tenderness of that same toe and of the lateral aspect of the toenail    Past medical history medical records reviewed and noteworthy for history of ADHD and fetal alcohol syndrome    Medications are none currently  Allergies none known    Exam of left foot demonstrates ingrown nail inflammation swelling and tenderness of the large toe.  The right foot does not have this development at this time in the large toe but clearly he has been cutting his toenails too short.    Assessment infected ingrown left large toe  Plan: Discussed the importance of not cutting the toenail in any fashion other than straight across to allow the edges to grow out and not in the bed themselves into the tissues.  He seemed to be a little more receptive when he hurt this recommendation for the treatment plan.  I doubt if he will need additional surgery at this point time but certainly follow-up with his primary care provider or the provider who he prefers to take care of this recurring problem.  Patient sheet regarding ingrown toenail provided and a prescription for Keflex 500 mg 3 times daily number 30 pills discussed soaking the foot to 3 times a day keeping it protected and covered, preferably wearing sandals or shoes that are not compressing his toe and aggravating the discomfort and swelling

## 2019-09-28 NOTE — PATIENT INSTRUCTIONS
Patient Education     Ingrown Toenail, Infected (Antibiotics, No Excision)  An ingrown toenail occurs when the nail grows sideways into the skin alongside the nail. This can cause pain. It can also lead to an infection with redness, swelling, and sometimes drainage.  The most common cause of an ingrown toenail is trimming your nails wrong. Most people trim the nails too close to the skin and try to round the nail too tightly around the shape of the toe. When you do this, the nail can grow into the skin of your toe. It is safer to trim the nail ending in a straight line rather than a curve.  Other causes include injury or wearing shoes that are too short or tight. This can cause the same problem that happens when trimming your nails. Your genetics can also make this more likely to happen.  The following are the most common symptoms of an ingrown toenail:     Pain    Redness    Swelling    Drainage  If the infection is mild, you may be able to take care of it at home with the following measures:    Frequent warm water soaks    Keeping it clean    Wearing loose, comfortable shoes or sandals  Another method involves using a small piece of cotton or waxed dental floss to gently lift up the corner of the problem nail. Change the cotton or floss frequently, especially if it gets dirty.  If your infection is mild, and the above methods aren t working, or if the infection gets worse, see your healthcare provider. Signs of worsening infection include:    Swelling    Redness    Pus drainage  In some cases, you may need antibiotics along with warm soaks. If after 2 to 3 days of antibiotics the toenail doesn't get better or gets worse, part of the nail may need to be removed to drain the infection. With treatment, it can take 1 to 2 weeks to clear up completely.  Home care  Wound care  For the next 3 days, soak and clean your toe in warm water a few times a day.    Twice a day for the first 3 days, clean and soak the toe as  follows:  1. Soak your foot in a tub of warm water for 5 minutes. Or, hold your toe under a faucet of warm running water for 5 minute  2. Clean any remaining crust away with soap and water using a cotton swab.  3. Put a small amount of antibiotic ointment on the infected area.    Change the dressing or bandage every time you soak or clean it, or whenever it becomes wet or dirty.    If you were prescribed antibiotics, take them as directed until they are all gone.    Wear comfortable shoes with a lot of toe room, or open-toe sandals, while your toe is healing.  Medicines    You can take over-the-counter medicine for pain, unless you were given a different pain medicine to use. Note: Talk with your provider before using these medicines if you have chronic liver or kidney disease, ever had a stomach ulcer or GI (gastrointestinal) bleeding, or are taking blood thinner medicines.    If you were given antibiotics, take them until they are used up or your provider tells you to stop, even if the wound looks better. This ensures that the infection clears up.  Prevention  To prevent ingrown toenails:    Wear shoes that fit well. Avoid shoes that pinch the toes together.    When you trim your toenails, do not cut them too short. Cut straight across at the top and don t round the edges.    Don t use a sharp object to clean under your nail since this might cause an infection.    If the toenail starts to grow into the skin again, put a small piece of waxed dental floss or cotton under that side of the nail to help it grow out straight.  Follow-up care  Follow up with your healthcare provider, or as advised.  When to seek medical advice  Call your healthcare provider right away if any of the following occur:    Increasing redness, pain, or swelling of the toe    Red streaks in the skin leading away from the wound    Pus or fluid drainage    Fever of 100.4 F (38 C) or higher, or as directed by your provider  Date Last Reviewed:  12/1/2016 2000-2018 [employer]. [address] [place] [address], [place] [address]. All rights reserved. This information is not intended as a substitute for professional medical care. Always follow your healthcare professional's instructions.

## 2019-09-28 NOTE — ED TRIAGE NOTES
Left great toe slightly swollen, red with small amount of drainage.  pateint agreed to be seen in Urgent Care.  Sent to Urgent Care- ambulatory.

## 2019-10-11 ENCOUNTER — OFFICE VISIT (OUTPATIENT)
Dept: FAMILY MEDICINE | Facility: OTHER | Age: 24
End: 2019-10-11
Attending: FAMILY MEDICINE
Payer: COMMERCIAL

## 2019-10-11 VITALS
BODY MASS INDEX: 31.32 KG/M2 | HEART RATE: 60 BPM | WEIGHT: 197 LBS | RESPIRATION RATE: 16 BRPM | TEMPERATURE: 95.4 F | SYSTOLIC BLOOD PRESSURE: 122 MMHG | DIASTOLIC BLOOD PRESSURE: 80 MMHG

## 2019-10-11 DIAGNOSIS — Z23 NEED FOR PROPHYLACTIC VACCINATION AND INOCULATION AGAINST INFLUENZA: ICD-10-CM

## 2019-10-11 DIAGNOSIS — L60.0 INGROWN TOENAIL OF LEFT FOOT: Primary | ICD-10-CM

## 2019-10-11 PROCEDURE — G0463 HOSPITAL OUTPT CLINIC VISIT: HCPCS

## 2019-10-11 PROCEDURE — 11730 AVULSION NAIL PLATE SIMPLE 1: CPT | Performed by: PHYSICIAN ASSISTANT

## 2019-10-11 PROCEDURE — 90686 IIV4 VACC NO PRSV 0.5 ML IM: CPT

## 2019-10-11 PROCEDURE — 90471 IMMUNIZATION ADMIN: CPT | Performed by: PHYSICIAN ASSISTANT

## 2019-10-11 ASSESSMENT — PAIN SCALES - GENERAL: PAINLEVEL: MILD PAIN (2)

## 2019-10-11 NOTE — PROGRESS NOTES
Nursing Notes:   Sandrine Villeda LPN  10/11/2019 10:47 AM  Signed  Chief Complaint   Patient presents with     Ingrown Toenail     Left big toe         Medication Reconciliation: complete    Sandrine Villeda LPN      HPI:    Phi Duncan is a 23 year old male who presents for ingrown toenail.  Patient has ingrown toenail on the left great toe.  Patient previously was seen on 9/28/2019.  At that time patient was started on Keflex for treatment.  Patient states that the erythema and drainage have calmed down however the toenail is still very tender and swollen.  Interested in having a partial toenail removal completed in order to calm down the symptoms.  History of having a partial toenail removal in the past.  Tolerated it well in the past.  No fevers or chills.  No GI symptoms.    Patient would also like a flu shot.    Past Medical History:   Diagnosis Date     Attention-deficit hyperactivity disorder     No Comments Provided     Major depressive disorder, single episode     No Comments Provided     Personal history of other medical treatment (CODE)     No Comments Provided     Sleep deprivation     No Comments Provided       Past Surgical History:   Procedure Laterality Date     EXTRACTION(S) DENTAL N/A     x8; then braces placed     EXTRACTION(S) DENTAL      wisdom teeth     LACERATION REPAIR N/A 2015    to upper lip; done in OR       Family History   Problem Relation Age of Onset     Diabetes Mother      Cancer Mother         Cancer,Unsure type- in pelvis     Alcoholism Father      No Known Problems Sister        Social History     Tobacco Use     Smoking status: Current Every Day Smoker     Packs/day: 0.40     Years: 8.00     Pack years: 3.20     Types: Cigarettes     Smokeless tobacco: Never Used     Tobacco comment: Quit smoking: trying to quit   Substance Use Topics     Alcohol use: Yes     Alcohol/week: 0.0 standard drinks     Comment: occasionally        Current Outpatient Medications    Medication Sig Dispense Refill     cyclobenzaprine (FLEXERIL) 10 MG tablet Take 1 tablet (10 mg) by mouth 2 times daily as needed for muscle spasms 180 tablet 4     famotidine (PEPCID) 20 MG tablet TAKE 1 TABLET BY MOUTH ONCE DAILY IF NEEDED 90 tablet 4       No Known Allergies    REVIEW OF SYSTEMS:  Refer to HPI.    EXAM:   Vitals:    /80 (BP Location: Right arm, Patient Position: Sitting, Cuff Size: Adult Regular)   Pulse 60   Temp 95.4  F (35.2  C)   Resp 16   Wt 89.4 kg (197 lb)   BMI 31.32 kg/m      General Appearance: Pleasant, alert, appropriate appearance for age. No acute distress  Chest/Respiratory Exam: Normal chest wall and respirations. Clear to auscultation.  Cardiovascular Exam: Regular rate and rhythm. S1, S2, no murmur, click, gallop, or rubs.  Skin: Left great toenail has swelling and mild pain with palpation on the medial and distal border.  Minimal erythema appreciated.  No pus or drainage appreciated.  No warmth.  Psychiatric Exam: Alert and oriented - appropriate affect.    Procedural note:   Options are discussed and he has elected to have medial portion of left great toenail removed. Time out was called.  Patient was prepped and draped in a proper sterile manner.  Under sterile conditions a digit block was performed with 1% Lidocaine without epi.  Medial portion of left great toenail was removed. Hemostasis was achieved with direct pressure. Bacitracin and sterile guaze was then applied. He tolerated the procedure well.  No complications were appreciated.    PHQ Depression Screen  PHQ-9 SCORE 8/30/2017 12/7/2018 6/6/2019   PHQ-9 Total Score 10 0 7       ASSESSMENT AND PLAN:      ICD-10-CM    1. Ingrown toenail of left foot L60.0 REMOVAL OF NAIL PLATE SIMPLE SINGLE   2. Need for prophylactic vaccination and inoculation against influenza Z23 GH-IMM- FLU VAC PRESRV FREE QUAD SPLIT VIR > 6 MONTHS IM       Ingrown toenail of the left foot: Removed medial portion of left great toenail  without complication.  Patient tolerated the procedure well.  No side effects noted.  No further antibiotics are warranted at this time.  Gave patient education.  Encouraged to soak toe in warm water with antibacterial soap 10-15 minutes at a time a few times per day.  Can massage toe to remove the pus.  Please return to clinic if symptoms change/worsen. Can take tylenol as needed for pain. Encouraged to not cut toenails too short and to cut them straight across.    Patient was given a flu shot.    Patient Instructions   Encouraged to soak toe in warm water with antibacterial soap 10-15 minutes at a time a few times per day.  Can massage toe to remove the pus.  Please return to clinic if symptoms change/worsen. Can take tylenol as needed for pain. Encouraged to not cut toenails too short and to cut them straight across.        Patient Education     Ingrown Toenail (Excised)  An ingrown toenail occurs when the nail grows sideways into the skin next to the nail. This can cause pain and may lead to an infection with redness, swelling, and sometimes drainage.  The most common cause of an ingrown toenail is trimming your toenails wrong. Most people trim the nails too close to the skin and try to round the nail too tightly around the shape of the toe. When you do this, the nail can grow into the skin of the toe. While it may look nice, your toenail can grow into the skin and cause infection. It is safer to trim the nail to end in a straight line rather than a curve.  Other causes include injury or wearing shoes that are too short or tight. This can cause the same problem that happens when trimming your toenails. Sometimes you are born with a toenail that grows too large for your toe.  The most common symptoms of an ingrown toenail include:    Pain    Redness    Swelling    Drainage  Treatment  It's important to treat an ingrown toenail as soon as you notice there is a problem. If the infection is mild, you may be able to  take care of it at home. Home care includes:    Frequent warm water soaks    Keeping the nail clean    Wearing loose, comfortable shoes or open toe sandals  Another method to help the toe heal is to use a small piece of cotton or waxed dental floss to gently lift the corner of the problem nail. Change the cotton or floss frequently, especially if it gets dirty.  If your infection is mild but home care isn't working, or the toenail is getting worse, see your healthcare provider. Signs of worsening infection include:    Swelling    Redness     Pus drainage  In some cases, part of the toenail needs to be removed by your healthcare provider so that the infection can be drained.  If there is a lot of redness and swelling, then an antibiotic may also be used. The redness and pain should go away within 48 hours. It will take about 2 weeks for the exposed nail bed to become dry and for the swelling to go down.  If only the side of the nail was removed, it will begin to grow back in a few months. To prevent recurrence, sometimes the side of the nail bed may be treated with a strong chemical to prevent the nail from growing back.  Home care  Wound care    Twice a day for the first 3 days, clean and soak the toe as follows:  ? Soak your foot in a tub of warm water for 5 minutes. Or, hold your toe under a faucet of warm running water for 5 minutes.  ? Clean any remaining crust away with soap and water using a cotton swab.  ? Put a small amount of antibiotic ointment on the infected area.  ? Cover with a bandage until the exposed nail bed is dry and there is no more drainage.    Change the dressing or bandage every time you soak or clean it, or whenever it becomes wet or dirty.    If you were prescribed antibiotics, take them as directed until they are all gone.    While your toe is healing wear comfortable shoes with a lot of toe room. Or wear open-toe sandals.  Medicines    You can take over-the-counter medicine for pain,  unless you were given a different pain medicine to use. Note: Talk with your healthcare provider before using these medicines if you have chronic liver or kidney disease, have ever had a stomach ulcer or GI (gastrointestinal) bleeding, or are taking blood thinner medicines.    If you were given antibiotics, take them until they are all gone. It is important to finish the antibiotics even if the wound looks better. This ensures that the infection clears.  Prevention  To prevent ingrown toenails:    Wear shoes that fit well. Avoid shoes that pinch the toes together.    When you trim your toenails, don t cut them too short. Cut straight across at the top and don t round the edges.    Don t use a sharp object to clean under your nail since this might cause an infection.    If the toenail starts to grow into the skin again, put a small piece of cotton under that side of the nail to help it grow out straight.  Follow-up care  Follow up as advised by your healthcare provider. If the ingrown toenail recurs, follow up with a foot specialist (podiatrist) for nail bed ablation.  When to seek medical care  Call your healthcare provider right away if any of these occur:    Increasing redness, pain, or swelling of the toe    Red streaks in the skin leading away from the wound    Continued pus or fluid drainage for more than 24 hours    Fever of 100.4 F (38 C) or higher, or as directed by your provider  Date Last Reviewed: 11/1/2016 2000-2018 [employer]. [address] [place], [place] [id number]. All rights reserved. This information is not intended as a substitute for professional medical care. Always follow your healthcare professional's instructions.                Nery Arroyo PA-C PA-C..................10/11/2019 10:46 AM

## 2019-10-11 NOTE — LETTER
My Depression Action Plan  Name: Phi Duncan   Date of Birth 1995  Date: 10/11/2019    My doctor: Yomaira Faulkner   My clinic: [facility]  [address]  [place] [id number] [phone]          GREEN    ZONE   Good Control    What it looks like:     Things are going generally well. You have normal up s and down s. You may even feel depressed from time to time, but bad moods usually last less than a day.   What you need to do:  1. Continue to care for yourself (see self care plan)  2. Check your depression survival kit and update it as needed  3. Follow your physician s recommendations including any medication.  4. Do not stop taking medication unless you consult with your physician first.           YELLOW         ZONE Getting Worse    What it looks like:     Depression is starting to interfere with your life.     It may be hard to get out of bed; you may be starting to isolate yourself from others.    Symptoms of depression are starting to last most all day and this has happened for several days.     You may have suicidal thoughts but they are not constant.   What you need to do:     1. Call your care team, your response to treatment will improve if you keep your care team informed of your progress. Yellow periods are signs an adjustment may need to be made.     2. Continue your self-care, even if you have to fake it!    3. Talk to someone in your support network    4. Open up your depression survival kit           RED    ZONE Medical Alert - Get Help    What it looks like:     Depression is seriously interfering with your life.     You may experience these or other symptoms: You can t get out of bed most days, can t work or engage in other necessary activities, you have trouble taking care of basic hygiene, or basic responsibilities, thoughts of suicide or death that will not go away, self-injurious behavior.     What you need to do:  1. Call your care  team and request a same-day appointment. If they are not available (weekends or after hours) call your local crisis line, emergency room or 911.            Depression Self Care Plan / Survival Kit    Self-Care for Depression  Here s the deal. Your body and mind are really not as separate as most people think.  What you do and think affects how you feel and how you feel influences what you do and think. This means if you do things that people who feel good do, it will help you feel better.  Sometimes this is all it takes.  There is also a place for medication and therapy depending on how severe your depression is, so be sure to consult with your medical provider and/ or Behavioral Health Consultant if your symptoms are worsening or not improving.     In order to better manage my stress, I will:    Exercise  Get some form of exercise, every day. This will help reduce pain and release endorphins, the  feel good  chemicals in your brain. This is almost as good as taking antidepressants!  This is not the same as joining a gym and then never going! (they count on that by the way ) It can be as simple as just going for a walk or doing some gardening, anything that will get you moving.      Hygiene   Maintain good hygiene (Get out of bed in the morning, Make your bed, Brush your teeth, Take a shower, and Get dressed like you were going to work, even if you are unemployed).  If your clothes don't fit try to get ones that do.    Diet  I will strive to eat foods that are good for me, drink plenty of water, and avoid excessive sugar, caffeine, alcohol, and other mood-altering substances.  Some foods that are helpful in depression are: complex carbohydrates, B vitamins, flaxseed, fish or fish oil, fresh fruits and vegetables.    Psychotherapy  I agree to participate in Individual Therapy (if recommended).    Medication  If prescribed medications, I agree to take them.  Missing doses can result in serious side effects.  I  understand that drinking alcohol, or other illicit drug use, may cause potential side effects.  I will not stop my medication abruptly without first discussing it with my provider.    Staying Connected With Others  I will stay in touch with my friends, family members, and my primary care provider/team.    Use your imagination  Be creative.  We all have a creative side; it doesn t matter if it s oil painting, sand castles, or mud pies! This will also kick up the endorphins.    Witness Beauty  (AKA stop and smell the roses) Take a look outside, even in mid-winter. Notice colors, textures. Watch the squirrels and birds.     Service to others  Be of service to others.  There is always someone else in need.  By helping others we can  get out of ourselves  and remember the really important things.  This also provides opportunities for practicing all the other parts of the program.    Humor  Laugh and be silly!  Adjust your TV habits for less news and crime-drama and more comedy.    Control your stress  Try breathing deep, massage therapy, biofeedback, and meditation. Find time to relax each day.     My support system    Clinic Contact:  Phone number:    Contact 1:  Phone number:    Contact 2:  Phone number:    Yazidism/:  Phone number:    Therapist:  Phone number:    Local crisis center:    Phone number:    Other community support:  Phone number:

## 2019-10-11 NOTE — NURSING NOTE
Chief Complaint   Patient presents with     Ingrown Toenail     Left big toe         Medication Reconciliation: complete    Sandrine Villeda, LPN

## 2020-01-23 DIAGNOSIS — F17.200 TOBACCO USE DISORDER: Primary | ICD-10-CM

## 2020-01-23 RX ORDER — NICOTINE 21 MG/24HR
1 PATCH, TRANSDERMAL 24 HOURS TRANSDERMAL EVERY 24 HOURS
Qty: 30 PATCH | Refills: 2 | Status: SHIPPED | OUTPATIENT
Start: 2020-01-23 | End: 2022-02-22

## 2020-01-23 NOTE — PROGRESS NOTES
Was in clinic with son's well child visit - and upon discussing smoking exposures/need to quit; Phi expresses desire to quit, and would like assistance using nicotine patches.    Also brings up difficulty maintaining an erection and would like to try a medication to help him with this.  I have discussed that stopping smoking is going to be his best treatment as insurance does not cover ED medications - I attempted to Rx but unsuccessful due to no coverage of medication.  Other options: penile pump, urology referral.    Yomaira Faulkner, DO on 1/23/2020 at 7:15 AM

## 2020-02-13 ENCOUNTER — OFFICE VISIT (OUTPATIENT)
Dept: FAMILY MEDICINE | Facility: OTHER | Age: 25
End: 2020-02-13
Attending: FAMILY MEDICINE
Payer: COMMERCIAL

## 2020-02-13 VITALS
HEIGHT: 65 IN | BODY MASS INDEX: 32.77 KG/M2 | SYSTOLIC BLOOD PRESSURE: 112 MMHG | HEART RATE: 90 BPM | RESPIRATION RATE: 16 BRPM | WEIGHT: 196.7 LBS | TEMPERATURE: 98.9 F | DIASTOLIC BLOOD PRESSURE: 70 MMHG | OXYGEN SATURATION: 94 %

## 2020-02-13 DIAGNOSIS — B00.1 COLD SORE: Primary | ICD-10-CM

## 2020-02-13 PROCEDURE — G0463 HOSPITAL OUTPT CLINIC VISIT: HCPCS

## 2020-02-13 PROCEDURE — 99213 OFFICE O/P EST LOW 20 MIN: CPT | Performed by: FAMILY MEDICINE

## 2020-02-13 RX ORDER — ACYCLOVIR 400 MG/1
400 TABLET ORAL EVERY 8 HOURS
Qty: 30 TABLET | Refills: 0 | Status: SHIPPED | OUTPATIENT
Start: 2020-02-13 | End: 2020-04-24

## 2020-02-13 SDOH — HEALTH STABILITY: MENTAL HEALTH: HOW OFTEN DO YOU HAVE A DRINK CONTAINING ALCOHOL?: 2-4 TIMES A MONTH

## 2020-02-13 SDOH — HEALTH STABILITY: MENTAL HEALTH: HOW MANY STANDARD DRINKS CONTAINING ALCOHOL DO YOU HAVE ON A TYPICAL DAY?: 1 OR 2

## 2020-02-13 ASSESSMENT — PAIN SCALES - GENERAL: PAINLEVEL: MILD PAIN (3)

## 2020-02-13 ASSESSMENT — MIFFLIN-ST. JEOR: SCORE: 1801.6

## 2020-02-13 NOTE — NURSING NOTE
"Chief Complaint   Patient presents with     Derm Problem     lip area        Initial /70 (BP Location: Left arm, Patient Position: Sitting, Cuff Size: Adult Large)   Pulse 90   Temp 98.9  F (37.2  C) (Tympanic)   Resp 16   Ht 1.655 m (5' 5.16\")   Wt 89.2 kg (196 lb 11.2 oz)   SpO2 94%   BMI 32.58 kg/m   Estimated body mass index is 32.58 kg/m  as calculated from the following:    Height as of this encounter: 1.655 m (5' 5.16\").    Weight as of this encounter: 89.2 kg (196 lb 11.2 oz).  Medication Reconciliation: complete    Lonny Bradley LPN  "

## 2020-02-13 NOTE — PROGRESS NOTES
"  SUBJECTIVE:   Phi Duncan is a 24 year old male who presents to clinic today for the following health issues: Cold sore    Patient arrives here for cold sore.  States he recently shaving and nicked his lower lip.  Shortly after that he developed 2 lesions on his lower lip.  This started this morning.  Came on fairly quickly.  Typically he lets them go on [place] but these are fairly fast-growing and cover quite a bit of his lip.        Patient Active Problem List    Diagnosis Date Noted     Fetal alcohol syndrome 05/16/2019     Priority: Medium     Abdominal pain, left upper quadrant 12/08/2018     Priority: Medium     ADHD 02/01/2018     Priority: Medium     Overview:   also FAES (per patient)       Gastroesophageal reflux disease 08/23/2017     Priority: Medium     Other nonallopathic lesion of cervical region 08/17/2012     Priority: Medium     Nonallopathic lesion of thoracic region 08/17/2012     Priority: Medium     Constipation 07/14/2011     Priority: Medium     Viral warts 04/29/2011     Priority: Medium     Past Medical History:   Diagnosis Date     Attention-deficit hyperactivity disorder     No Comments Provided     Major depressive disorder, single episode     No Comments Provided     Personal history of other medical treatment (CODE)     No Comments Provided     Sleep deprivation     No Comments Provided        Review of Systems     OBJECTIVE:     /70 (BP Location: Left arm, Patient Position: Sitting, Cuff Size: Adult Large)   Pulse 90   Temp 98.9  F (37.2  C) (Tympanic)   Resp 16   Ht 1.655 m (5' 5.16\")   Wt 89.2 kg (196 lb 11.2 oz)   SpO2 94%   BMI 32.58 kg/m    Body mass index is 32.58 kg/m .  Physical Exam  Skin:     Comments: To  vesicular lesions with multiple areas.  On the lower lip.  Small   Neurological:      Mental Status: He is alert.         Diagnostic Test Results:  none     ASSESSMENT/PLAN:         1. Cold sore  Start  - acyclovir (ZOVIRAX) 400 MG tablet; Take 1 tablet " (400 mg) by mouth every 8 hours for 10 days  Dispense: 30 tablet; Refill: 0      Rogelio Parsons MD  [facility] AND [facility]

## 2020-04-24 ENCOUNTER — HOSPITAL ENCOUNTER (EMERGENCY)
Facility: OTHER | Age: 25
End: 2020-04-24
Payer: COMMERCIAL

## 2020-04-24 ENCOUNTER — HOSPITAL ENCOUNTER (EMERGENCY)
Dept: GENERAL RADIOLOGY | Facility: OTHER | Age: 25
End: 2020-04-24
Attending: PHYSICIAN ASSISTANT
Payer: COMMERCIAL

## 2020-04-24 ENCOUNTER — OFFICE VISIT (OUTPATIENT)
Dept: FAMILY MEDICINE | Facility: OTHER | Age: 25
End: 2020-04-24
Attending: PHYSICIAN ASSISTANT
Payer: COMMERCIAL

## 2020-04-24 VITALS
TEMPERATURE: 98 F | WEIGHT: 194.4 LBS | HEART RATE: 86 BPM | HEIGHT: 65 IN | RESPIRATION RATE: 16 BRPM | SYSTOLIC BLOOD PRESSURE: 120 MMHG | DIASTOLIC BLOOD PRESSURE: 76 MMHG | BODY MASS INDEX: 32.39 KG/M2

## 2020-04-24 VITALS — OXYGEN SATURATION: 97 % | TEMPERATURE: 98.6 F | SYSTOLIC BLOOD PRESSURE: 147 MMHG | DIASTOLIC BLOOD PRESSURE: 55 MMHG

## 2020-04-24 DIAGNOSIS — S92.515A CLOSED NONDISPLACED FRACTURE OF PROXIMAL PHALANX OF LESSER TOE OF LEFT FOOT, INITIAL ENCOUNTER: Primary | ICD-10-CM

## 2020-04-24 DIAGNOSIS — S99.922A FOOT INJURY, LEFT, INITIAL ENCOUNTER: ICD-10-CM

## 2020-04-24 PROCEDURE — 99213 OFFICE O/P EST LOW 20 MIN: CPT | Performed by: PHYSICIAN ASSISTANT

## 2020-04-24 PROCEDURE — G0463 HOSPITAL OUTPT CLINIC VISIT: HCPCS

## 2020-04-24 PROCEDURE — 73630 X-RAY EXAM OF FOOT: CPT | Mod: LT

## 2020-04-24 ASSESSMENT — PAIN SCALES - GENERAL: PAINLEVEL: EXTREME PAIN (8)

## 2020-04-24 ASSESSMENT — MIFFLIN-ST. JEOR: SCORE: 1791.16

## 2020-04-24 NOTE — PROGRESS NOTES
SUBJECTIVE:   Phi Duncan is a 24 year old male who presents to clinic today for the following health issues:    Patient presents to clinic for evaluation of foot and toe pain  Onset 2 days ago.  Mechanism of injury patient had moved his couch to block a stairway and he was trying to step over the couch when he slipped and fell down a couple of steps injuring his left foot and second third and fourth toes.  He states that he heard a pop.  He has not been able to walk on his foot since this time.  Reports a constant ache with a severity of 5 out of 10 to 8 out of 10.  Aggravated by standing or bearing weight, walking, direct pressure.  Alleviated by rest.  Treatments: None  Previous injury/fracture: None        Patient Active Problem List    Diagnosis Date Noted     Fetal alcohol syndrome 05/16/2019     Priority: Medium     Abdominal pain, left upper quadrant 12/08/2018     Priority: Medium     ADHD 02/01/2018     Priority: Medium     Overview:   also FAES (per patient)       Gastroesophageal reflux disease 08/23/2017     Priority: Medium     Other nonallopathic lesion of cervical region 08/17/2012     Priority: Medium     Nonallopathic lesion of thoracic region 08/17/2012     Priority: Medium     Constipation 07/14/2011     Priority: Medium     Viral warts 04/29/2011     Priority: Medium     Past Medical History:   Diagnosis Date     Attention-deficit hyperactivity disorder     No Comments Provided     Major depressive disorder, single episode     No Comments Provided     Personal history of other medical treatment (CODE)     No Comments Provided     Sleep deprivation     No Comments Provided      Social History     Social History Narrative    Previously in residential care at [facility]. He reports he got there by stealing an Ipod.  Currently living in a foster home on [facility].  He loves to fish.    Works at [employer]     Current Outpatient Medications   Medication Sig Dispense Refill     cyclobenzaprine  "(FLEXERIL) 10 MG tablet Take 1 tablet (10 mg) by mouth 2 times daily as needed for muscle spasms 180 tablet 4     famotidine (PEPCID) 20 MG tablet TAKE 1 TABLET BY MOUTH ONCE DAILY IF NEEDED 90 tablet 4     nicotine (NICODERM CQ) 14 MG/24HR 24 hr patch Place 1 patch onto the skin every 24 hours 30 patch 2     order for DME Surgical shoe and crutches 1 Device 0     No Known Allergies    Review of Systems   Constitutional: Negative.     Musculoskeletal: Positive per HPI related to left foot injury    OBJECTIVE:     /76 (BP Location: Right arm, Patient Position: Sitting, Cuff Size: Adult Regular)   Pulse 86   Temp 98  F (36.7  C) (Tympanic)   Resp 16   Ht 1.655 m (5' 5.16\")   Wt 88.2 kg (194 lb 6.4 oz)   BMI 32.19 kg/m    Body mass index is 32.19 kg/m .     Physical Exam  Constitutional:       General: He is not in acute distress.     Appearance: Normal appearance. He is not ill-appearing.   Musculoskeletal:      Left foot: Decreased range of motion. Tenderness and swelling present. No deformity or laceration.        Feet:       Comments: Erythema, mild ecchymosis, mild swelling over 2, 3, 4 toes and distal metatarsal 2, 3, 4 of left foot. Normal cap refill, sensation to soft touch, temperature. ROM limited due to pain.    Neurological:      Mental Status: He is alert.         Results for orders placed or performed during the hospital encounter of 04/24/20   XR Foot Left G/E 3 Views     Status: None    Narrative    PROCEDURE:  XR FOOT LT G/E 3 VW    HISTORY: fall 2 days ago injuring 2, 3, 4 toes of left foot.  ecchymosis, swelling, erythema of 2-4 toes; Foot injury, left, initial  encounter    COMPARISON:  None.    TECHNIQUE:  3 views of the left foot were obtained.    FINDINGS:  There is a fracture of the proximal phalanx of the second  toe. Fracture is nondisplaced and extends to the articular surface of  the second metatarsal phalangeal joint.. The remainder of the foot  appears intact. Articular spaces " are normal height.       Impression    IMPRESSION: Not displaced fracture proximal phalanx second toe      RAULITO LAURA MD         ASSESSMENT/PLAN:       Phi was seen today for foot problems.    Diagnoses and all orders for this visit:    Closed nondisplaced fracture of proximal phalanx of lesser toe of left foot, initial encounter  -     order for DME; Surgical shoe and crutches    Foot injury, left, initial encounter  -     XR Foot Left G/E 3 Views; Future      Left foot/toe injury 2 days ago after falling while trying to step over his couch.   On exam - pain, erythema, ecchymosis, swelling 2-4 toes and 2-4 distal metatarsal  XR - fracture to proximal phalanx of second toe.  Image independently reviewed.  Reviewed images and discussed results with patient along with recommendations per AVS crutches  Surgical shoe and provided in clinic today. Encouraged to continue to splint with gisselle taping if he progresses to shoes for 4-6 weeks.   Symptomatic treatments: Elevate, ice, Tylenol and ibuprofen  Return to clinic for recheck in 1 week if symptoms persist or worsen  Patient received verbal and written instruction including review of warning signs      Karla Canseco PA-C  [facility] AND [facility]

## 2020-04-24 NOTE — NURSING NOTE
"Patient presents to the clinic today with complaints of toe pain after taking a fall 2 days ago.  Vielka Jett LPN 4/24/2020   3:02 PM    Chief Complaint   Patient presents with     Foot Problems       Initial /76 (BP Location: Right arm, Patient Position: Sitting, Cuff Size: Adult Regular)   Pulse 86   Temp 98  F (36.7  C) (Tympanic)   Resp 16   Ht 1.655 m (5' 5.16\")   Wt 88.2 kg (194 lb 6.4 oz)   BMI 32.19 kg/m   Estimated body mass index is 32.19 kg/m  as calculated from the following:    Height as of this encounter: 1.655 m (5' 5.16\").    Weight as of this encounter: 88.2 kg (194 lb 6.4 oz).  Medication Reconciliation: complete  Vielka Jett LPN    "

## 2020-04-24 NOTE — PATIENT INSTRUCTIONS
Fall with injury to toes, left foot  XR left foot - closed, non displaced fracture of proximal phalanx of second toe  Treatments  Surgical shoe given in clinic and crutches  Elevate, apply ice  Ibuprofen 800 mg every 6-8 hours, max 2400 mg/day and tylenol 1000 mg every 4-6 hours, max 4000 mg/day  Follow up with PCP if symptoms persist or worsen  Patient Education     Closed Toe Fracture  Your toe is broken (fractured). This causes local pain, swelling, and sometimes bruising. This injury usually takes about 4 to 6 weeks to heal, but can sometimes take longer. Toe injuries are often treated by taping the injured toe to the next one (gisselle taping). Or a hard shoe, splint or cast may be used. This protects the injured toe and holds it in position.  If the toenail has been severely injured, it may fall off in 1 to 2 weeks. It takes up to 12 months for a new toenail to grow back.  Home care  Follow these guidelines when caring for yourself at home:    You may be given a cast shoe to wear to keep your toe from moving. If not, you can use a sandal or any shoe that doesn t put pressure on the injured toe until the swelling and pain go away. If using a sandal, be careful not to strike your foot against anything. Another injury could make the fracture worse. If you were given crutches, don t put full weight on the injured foot until you can do so without pain, or as directed by your healthcare provider.    Keep your foot elevated to reduce pain and swelling. When sleeping, put a pillow under the injured leg. When sitting, support the injured leg so it is above your heart. This is very important during the first 2 days (48 hours).    Put an ice pack on the injured area. Do this for 20 minutes every 1 to 2 hours the first day for pain relief. You can make an ice pack by wrapping a plastic bag of ice cubes in a thin towel. As the ice melts, be careful that any cloth or paper tape doesn t get wet. Continue using the ice pack 3 to  4 times a day for the next 2 days. Then use the ice pack as needed to ease pain and swelling.    If buddy tape was used and it becomes wet or dirty, change it. You may replace it with paper, plastic, or cloth tape. Cloth tape and paper tapes must be kept dry.    You may use acetaminophen or ibuprofen to control pain, unless another pain medicine was prescribed. If you have chronic liver or kidney disease, talk with your healthcare provider before using these medicines. Also talk with your provider if you ve had a stomach ulcer or gastrointestinal bleeding.    You may return to sports or physical education activities after 4 weeks when you can run without pain, or as directed by your healthcare provider.  Follow-up care  Follow up with your healthcare provider in 1 week, or as advised. This is to make sure the bone is healing the way it should.  X-rays may be taken. You will be told of any new findings that may affect your care.  When to seek medical advice  Call your healthcare provider right away if any of these occur:    Pain or swelling gets worse    The cast/splint cracks    The cast and padding get wet and stays wet more than 24 hours    Bad odor from the cast/splint or wound fluid stains the cast    Tightness or pressure under the cast/splint gets worse    Toe becomes cold, blue, numb, or tingly    You can t move the toe    Signs of infection: fever, redness, warmth, swelling, or drainage from the wound or cast    Fever of 100.4 F (38 C) or higher, or chills as directed by your healthcare provider  Date Last Reviewed: 2/1/2017 2000-2019 [employer]. [address] [place] [address], [place] [address]. All rights reserved. This information is not intended as a substitute for professional medical care. Always follow your healthcare professional's instructions.

## 2020-04-25 ASSESSMENT — ENCOUNTER SYMPTOMS: CONSTITUTIONAL NEGATIVE: 1

## 2020-04-30 DIAGNOSIS — S92.515A CLOSED NONDISPLACED FRACTURE OF PROXIMAL PHALANX OF LESSER TOE OF LEFT FOOT, INITIAL ENCOUNTER: Primary | ICD-10-CM

## 2020-04-30 RX ORDER — HYDROCODONE BITARTRATE AND ACETAMINOPHEN 5; 325 MG/1; MG/1
1 TABLET ORAL EVERY 4 HOURS PRN
Qty: 18 TABLET | Refills: 0 | Status: SHIPPED | OUTPATIENT
Start: 2020-04-30 | End: 2020-06-15

## 2020-04-30 NOTE — PROGRESS NOTES
Phi presents with son to [facility]; had broken foot x 1 week ago.  Still having throbbing pain.  Has to be watching son and walking on it to some degree.  Wondering about some pain medication so he can continue to be somewhat mobile.   Is wearing post-op shoe in our appointment today and visit/fracture is corroborated by chart.  Rx for Norco x #18.    Yomaira Faulkner, DO on 4/30/2020 at 12:50 PM

## 2020-06-04 ENCOUNTER — HOSPITAL ENCOUNTER (OUTPATIENT)
Dept: GENERAL RADIOLOGY | Facility: OTHER | Age: 25
End: 2020-06-04
Attending: FAMILY MEDICINE
Payer: COMMERCIAL

## 2020-06-04 ENCOUNTER — OFFICE VISIT (OUTPATIENT)
Dept: FAMILY MEDICINE | Facility: OTHER | Age: 25
End: 2020-06-04
Attending: FAMILY MEDICINE
Payer: COMMERCIAL

## 2020-06-04 VITALS
WEIGHT: 190.13 LBS | RESPIRATION RATE: 12 BRPM | HEART RATE: 76 BPM | DIASTOLIC BLOOD PRESSURE: 76 MMHG | SYSTOLIC BLOOD PRESSURE: 110 MMHG | TEMPERATURE: 96.8 F | BODY MASS INDEX: 31.49 KG/M2

## 2020-06-04 DIAGNOSIS — S92.502D CLOSED FRACTURE OF PHALANX OF LEFT SECOND TOE WITH ROUTINE HEALING, SUBSEQUENT ENCOUNTER: Primary | ICD-10-CM

## 2020-06-04 DIAGNOSIS — N52.9 ERECTILE DYSFUNCTION, UNSPECIFIED ERECTILE DYSFUNCTION TYPE: ICD-10-CM

## 2020-06-04 PROCEDURE — G0463 HOSPITAL OUTPT CLINIC VISIT: HCPCS

## 2020-06-04 PROCEDURE — G0463 HOSPITAL OUTPT CLINIC VISIT: HCPCS | Mod: 25

## 2020-06-04 PROCEDURE — 99214 OFFICE O/P EST MOD 30 MIN: CPT | Performed by: FAMILY MEDICINE

## 2020-06-04 PROCEDURE — 73630 X-RAY EXAM OF FOOT: CPT | Mod: LT

## 2020-06-04 RX ORDER — SILDENAFIL CITRATE 20 MG/1
20-40 TABLET ORAL
Qty: 15 TABLET | Refills: 2 | Status: SHIPPED | OUTPATIENT
Start: 2020-06-04 | End: 2020-08-18

## 2020-06-04 ASSESSMENT — PATIENT HEALTH QUESTIONNAIRE - PHQ9: SUM OF ALL RESPONSES TO PHQ QUESTIONS 1-9: 7

## 2020-06-04 ASSESSMENT — ENCOUNTER SYMPTOMS
FEVER: 0
WEAKNESS: 0
APPETITE CHANGE: 0
FATIGUE: 0
ACTIVITY CHANGE: 0
NUMBNESS: 0
TREMORS: 0
CHILLS: 0
PARESTHESIAS: 0

## 2020-06-04 ASSESSMENT — PAIN SCALES - GENERAL: PAINLEVEL: NO PAIN (0)

## 2020-06-04 NOTE — NURSING NOTE
"Chief Complaint   Patient presents with     Toe Injury       Initial /76   Pulse 76   Temp 96.8  F (36  C) (Tympanic)   Resp 12   Wt 86.2 kg (190 lb 2 oz)   BMI 31.49 kg/m   Estimated body mass index is 31.49 kg/m  as calculated from the following:    Height as of 4/24/20: 1.655 m (5' 5.16\").    Weight as of this encounter: 86.2 kg (190 lb 2 oz).  Medication Reconciliation: complete    Shaye Yoon LPN  "

## 2020-06-04 NOTE — PROGRESS NOTES
"Nursing Notes:   Shaye Yoon LPN  6/4/2020  1:52 PM  Sign at exiting of workspace  Chief Complaint   Patient presents with     Toe Injury     Initial /76   Pulse 76   Temp 96.8  F (36  C) (Tympanic)   Resp 12   Wt 86.2 kg (190 lb 2 oz)   BMI 31.49 kg/m   Estimated body mass index is 31.49 kg/m  as calculated from the following:    Height as of 4/24/20: 1.655 m (5' 5.16\").    Weight as of this encounter: 86.2 kg (190 lb 2 oz).  Medication Reconciliation: complete  Shaye Yoon LPN    SUBJECTIVE:   Phi Duncan is a 24 year old male who presents to clinic today for the following health issues:    HPI  Phi is here in follow up to toe fracture.  He was diagnosed with 2nd phalanx toe fracture of left foot on 4/24/2020.  At that time he was placed in a post-op shoe.  He is here to follow up on healing.    Continues to have pain; was in a post-op shoe for 2-3 weeks.  But stopped wearing it because it got too hot.  He has a toddler aged son, who has caught it/bumped it multiple times.  He continues to have pain; worst with side to side motion.  No discoloration or major dislocation appearance to it.      Complains of continued ED.  Has been using a penile pump x 3+ months.  Has looked into [email or web address] and other online men's health services and uncertain about their credentials/efficacy; and has not tried any other medications/products.  Getting frustrating for him.      Patient Active Problem List    Diagnosis Date Noted     Fetal alcohol syndrome 05/16/2019     Priority: Medium     Abdominal pain, left upper quadrant 12/08/2018     Priority: Medium     ADHD 02/01/2018     Priority: Medium     Overview:   also FAES (per patient)       Gastroesophageal reflux disease 08/23/2017     Priority: Medium     Other nonallopathic lesion of cervical region 08/17/2012     Priority: Medium     Nonallopathic lesion of thoracic region 08/17/2012     Priority: Medium     Constipation 07/14/2011     Priority: Medium     " Viral warts 04/29/2011     Priority: Medium     Past Medical History:   Diagnosis Date     Attention-deficit hyperactivity disorder     No Comments Provided     Major depressive disorder, single episode     No Comments Provided     Personal history of other medical treatment (CODE)     No Comments Provided     Sleep deprivation     No Comments Provided      Past Surgical History:   Procedure Laterality Date     EXTRACTION(S) DENTAL N/A     x8; then braces placed     EXTRACTION(S) DENTAL      wisdom teeth     LACERATION REPAIR N/A 2015    to upper lip; done in OR     Family History   Problem Relation Age of Onset     Diabetes Mother      Cancer Mother         Cancer,Unsure type- in pelvis     Alcoholism Father      No Known Problems Sister      Social History     Tobacco Use     Smoking status: Current Every Day Smoker     Packs/day: 0.10     Years: 8.00     Pack years: 0.80     Types: Cigarettes     Smokeless tobacco: Never Used     Tobacco comment: 4-5 a day-Quit smoking: trying to quit   Substance Use Topics     Alcohol use: Yes     Alcohol/week: 0.0 standard drinks     Frequency: 2-4 times a month     Drinks per session: 1 or 2     Comment: occasionally      Social History     Social History Narrative    Previously in residential care at [facility]. He reports he got there by stealing an Ipod.  Currently living in a foster home on [facility].  He loves to fish.    Works at [employer]     Current Outpatient Medications   Medication Sig Dispense Refill     sildenafil (REVATIO) 20 MG tablet Take 1-2 tablets (20-40 mg) by mouth once as needed (ED) 15 tablet 2     cyclobenzaprine (FLEXERIL) 10 MG tablet Take 1 tablet (10 mg) by mouth 2 times daily as needed for muscle spasms 180 tablet 4     famotidine (PEPCID) 20 MG tablet TAKE 1 TABLET BY MOUTH ONCE DAILY IF NEEDED 90 tablet 4     nicotine (NICODERM CQ) 14 MG/24HR 24 hr patch Place 1 patch onto the skin every 24 hours 30 patch 2     order for DME Surgical shoe  and crutches 1 Device 0     No Known Allergies    Review of Systems   Constitutional: Negative for activity change, appetite change, chills, fatigue and fever.   Neurological: Negative for tremors, weakness, numbness and paresthesias.   All other systems reviewed and are negative.     OBJECTIVE:     /76   Pulse 76   Temp 96.8  F (36  C) (Tympanic)   Resp 12   Wt 86.2 kg (190 lb 2 oz)   BMI 31.49 kg/m    Body mass index is 31.49 kg/m .  Physical Exam  Vitals signs and nursing note reviewed.   Constitutional:       Appearance: Normal appearance. He is normal weight.   HENT:      Head: Normocephalic and atraumatic.   Cardiovascular:      Pulses:           Dorsalis pedis pulses are 2+ on the left side.        Posterior tibial pulses are 2+ on the left side.   Musculoskeletal:      Left foot: Normal range of motion. Deformity (mild swelling over proximal 2nd toe) present. No bunion or foot drop.   Feet:      Left foot:      Protective Sensation: 5 sites tested. 5 sites sensed.      Skin integrity: Skin integrity normal.      Toenail Condition: Left toenails are normal.   Neurological:      Mental Status: He is alert.      Gait: Gait normal.     Diagnostic Test Results:  Results for orders placed or performed in visit on 06/04/20   XR Foot Left G/E 3 Views     Status: None    Narrative    PROCEDURE:  XR FOOT LT G/E 3 VW    HISTORY: Closed fracture of phalanx of left second toe with routine  healing, subsequent encounter    COMPARISON:  April 24, 2020    TECHNIQUE:  3 views of the left foot were obtained.    FINDINGS:  There is increasing angulation of the fracture of the  proximal phalanx of the second toe is compared to previous  examination. No ossified callous is seen. The remainder of the foot is  intact.       Impression    IMPRESSION: Increasing angulation of the fracture fragment of the  proximal phalanx of the second toe as compared to April 24, 2020      RAULITO LAURA MD       ASSESSMENT/PLAN:      1. Closed fracture of phalanx of left second toe with routine healing, subsequent encounter  Due to continued symptoms this far out from injury and worsening of angulation - he will plan to see foot/ankle for consideration of further treatment need.  Encouraged strict use of post op shoe and protecting area between now and appointment.  Some concerns that he will not adhere to this.  Ibuprofen for pain.  - XR Foot Left G/E 3 Views  - Orthopedic & Spine  Referral    2. Erectile dysfunction, unspecified erectile dysfunction type  Uncertain of cash price of Revatio - but will attempt sending in as use of stress reduction, exercise, penis pump device has not been helpful to him.  If the medication is not available due to cost or not effective - will plan to have him follow up with Urology due to young age.  - sildenafil (REVATIO) 20 MG tablet; Take 1-2 tablets (20-40 mg) by mouth once as needed (ED)  Dispense: 15 tablet; Refill: 2      Yomaira Faulkner [facility] AND [facility]

## 2020-06-11 ENCOUNTER — OFFICE VISIT (OUTPATIENT)
Dept: ORTHOPEDICS | Facility: OTHER | Age: 25
End: 2020-06-11
Attending: FAMILY MEDICINE
Payer: COMMERCIAL

## 2020-06-11 VITALS
BODY MASS INDEX: 31.8 KG/M2 | WEIGHT: 192 LBS | SYSTOLIC BLOOD PRESSURE: 98 MMHG | HEART RATE: 60 BPM | DIASTOLIC BLOOD PRESSURE: 72 MMHG

## 2020-06-11 DIAGNOSIS — S92.502D CLOSED FRACTURE OF PHALANX OF LEFT SECOND TOE WITH ROUTINE HEALING, SUBSEQUENT ENCOUNTER: ICD-10-CM

## 2020-06-11 PROCEDURE — G0463 HOSPITAL OUTPT CLINIC VISIT: HCPCS

## 2020-06-11 PROCEDURE — 99202 OFFICE O/P NEW SF 15 MIN: CPT | Performed by: PODIATRIST

## 2020-06-11 NOTE — PROGRESS NOTES
Patient is here for consult on his left 2nd toe fracture.   Montserrat Tyson LPN .....................6/11/2020 1:37 PM

## 2020-06-11 NOTE — PROGRESS NOTES
Visit Date:   06/11/2020      Phi is healthy 24-year-old here to see me regarding second toe injury happened on 04/22 of this year as a stubbing type of injury on a couch bent in a weird direction.  Had a base proximal phalanx fracture at the MPJ on the medial side.  This has a displaced further than it had before is nonhealing at this point was sent to see me and discuss options.      HISTORY REVIEW:  I have reviewed this patient's past medical history, family history, social history as well as medications and allergies.  Any changes/additions were appropriately charted in the patient's electronic medical record.        PHYSICAL EXAMINATION:    CONSTITUTIONAL:  The patient is alert and oriented x3, well appearing and in no apparent distress.  Affect is pleasant and answers questions appropriately.   VASCULAR:  Circulation is intact with palpable pedal pulses and adequate capillary fill time to all digits.  Hair growth is present and appropriate to mid foot and digits.   NEUROLOGIC:  Light touch sensation is intact to digits.  There is a negative Tinel sign.  There are no signs of apparent nerve entrapment of superficial peroneal or common peroneal nerves.   ITEGUMENT:  No abnormal dermatologic lesions are noted.  Skin has normal texture and turgor.     MUSCULOSKELETAL:  Does have a slightly laterally drifting toe, likely due to the lateral capsule injury and plantar plate injury toe was in good alignment in the sagittal plane.  Tenderness to palpate surrounding.  He is wearing crocks.  He walks in these but does have a slight antalgic gait.  Does have pain associated with it.      IMAGING:  Three views of the left foot were taken on 06/04.  Does have an increasingly displaced fracture the medial base of the proximal phalanx of the second toe associated with the joint.      ASSESSMENT:  Displaced second proximal phalanx fracture.      PLAN:  I discussed condition and treatment with the patient today.  Given  further displacement and continued toe with pain.  I did discuss potential fixation options.  One option is to remove this fracture fragment and try to reattach the capsule and plantar plate which may be the only option.  Alternatively, if we were able to get this reduced and pinned it may be an option as well, which would be a primary option and we would fall back on the plantar plate repair if need be.  Plan on scheduling this in the coming days to week. Followup postoperatively.  We discussed surgery, recovery, risks, potential complications, alternatives and benefits in detail.         TOO MARMOLEJO DPM             D: 2020   T: 2020   MT:       Name:     NIKOLAS DE LA TORRE   MRN:      [id number]        Account:      [id number]   :      1995           Visit Date:   2020      Document: [id number]

## 2020-06-15 ENCOUNTER — ALLIED HEALTH/NURSE VISIT (OUTPATIENT)
Dept: FAMILY MEDICINE | Facility: OTHER | Age: 25
End: 2020-06-15
Attending: PODIATRIST
Payer: COMMERCIAL

## 2020-06-15 DIAGNOSIS — Z41.9 ELECTIVE SURGERY: Primary | ICD-10-CM

## 2020-06-15 DIAGNOSIS — Z20.822 COVID-19 RULED OUT: Primary | ICD-10-CM

## 2020-06-15 PROCEDURE — C9803 HOPD COVID-19 SPEC COLLECT: HCPCS

## 2020-06-15 PROCEDURE — U0003 INFECTIOUS AGENT DETECTION BY NUCLEIC ACID (DNA OR RNA); SEVERE ACUTE RESPIRATORY SYNDROME CORONAVIRUS 2 (SARS-COV-2) (CORONAVIRUS DISEASE [COVID-19]), AMPLIFIED PROBE TECHNIQUE, MAKING USE OF HIGH THROUGHPUT TECHNOLOGIES AS DESCRIBED BY CMS-2020-01-R: HCPCS | Mod: ZL | Performed by: PODIATRIST

## 2020-06-16 ENCOUNTER — OFFICE VISIT (OUTPATIENT)
Dept: FAMILY MEDICINE | Facility: OTHER | Age: 25
End: 2020-06-16
Attending: PHYSICIAN ASSISTANT
Payer: COMMERCIAL

## 2020-06-16 VITALS
BODY MASS INDEX: 30.92 KG/M2 | WEIGHT: 192.4 LBS | TEMPERATURE: 97.1 F | HEART RATE: 68 BPM | SYSTOLIC BLOOD PRESSURE: 118 MMHG | RESPIRATION RATE: 12 BRPM | OXYGEN SATURATION: 97 % | DIASTOLIC BLOOD PRESSURE: 64 MMHG | HEIGHT: 66 IN

## 2020-06-16 DIAGNOSIS — S92.502D CLOSED FRACTURE OF PHALANX OF LEFT SECOND TOE WITH ROUTINE HEALING, SUBSEQUENT ENCOUNTER: ICD-10-CM

## 2020-06-16 DIAGNOSIS — Z01.818 PREOP GENERAL PHYSICAL EXAM: Primary | ICD-10-CM

## 2020-06-16 LAB
HGB BLD-MCNC: 16.3 G/DL (ref 13.3–17.7)
SARS-COV-2 RNA SPEC QL NAA+PROBE: NOT DETECTED
SPECIMEN SOURCE: NORMAL

## 2020-06-16 PROCEDURE — 99213 OFFICE O/P EST LOW 20 MIN: CPT | Performed by: PHYSICIAN ASSISTANT

## 2020-06-16 PROCEDURE — G0463 HOSPITAL OUTPT CLINIC VISIT: HCPCS

## 2020-06-16 PROCEDURE — 36415 COLL VENOUS BLD VENIPUNCTURE: CPT | Mod: ZL | Performed by: PHYSICIAN ASSISTANT

## 2020-06-16 PROCEDURE — 85018 HEMOGLOBIN: CPT | Mod: ZL | Performed by: PHYSICIAN ASSISTANT

## 2020-06-16 ASSESSMENT — MIFFLIN-ST. JEOR: SCORE: 1787.53

## 2020-06-16 NOTE — NURSING NOTE
"Patient presents to clinic for preop exam.  Date of Surgery: 6/18/2020   Type of Surgery: left 2nd toe ORIF vs, platar plate repair   Surgeon: [facility]:  CONCHA    Fever/Chills or other infectious symptoms in past month: no  >10lb weight loss in past two months: no  O2 SAT: 97    Health Care Directive/Code status:  no  Hx of blood transfusions:   no  Td up to date:  yes  History of VRE/MRSA:  Yes 6+years ago    Preoperative Evaluation: Obstructive Sleep Apnea screening    S: Snore -  Do you snore loudly? (louder than talking or loud enough to be heard through closed doors)no  T: Tired - Do you often feel tired, fatigued, or sleepy during the daytime?no  O: Observed - Has anyone ever observed you stop breathing during your sleep?no  P: Pressure - Do you have or are you being treated for high blood pressure?no  B: BMI - BMI greater than 35kg/m2?no  A: Age - Age over 50 years old?no  N: Neck - Neck circumference greater than 40 cm?no  G: Gender - Gender: Male?yes    Total number of \"YES\" responses:  1    Scoring: Low risk of ANIBAL 0-2  At Risk of ANIBAL: >3 High Risk of ANIBAL: 5-8    Eva Barnes LPN 6/16/2020 9:01 AM    "

## 2020-06-16 NOTE — H&P (VIEW-ONLY)
"  [facility] AND [facility]  [address]  [place] [address]  [phone]  Dept: [phone]    PRE-OP EVALUATION:  Today's date: 2020    Phi Duncan (: 1995) presents for pre-operative evaluation assessment as requested by Dr. Singer.  He requires evaluation and anesthesia risk assessment prior to undergoing surgery/procedure for treatment of second toe fracture .    Nursing Notes:   Eva Barnes LPN  2020  9:07 AM  Sign at exiting of workspace  Patient presents to clinic for preop exam.  Date of Surgery: 2020   Type of Surgery: left 2nd toe ORIF vs, platar plate repair   Surgeon: Lucrecia   Hospital:  [facility]    Fever/Chills or other infectious symptoms in past month: no  >10lb weight loss in past two months: no  O2 SAT: 97    Health Care Directive/Code status:  no  Hx of blood transfusions:   no  Td up to date:  yes  History of VRE/MRSA:  Yes 6+years ago    Preoperative Evaluation: Obstructive Sleep Apnea screening    S: Snore -  Do you snore loudly? (louder than talking or loud enough to be heard through closed doors)no  T: Tired - Do you often feel tired, fatigued, or sleepy during the daytime?no  O: Observed - Has anyone ever observed you stop breathing during your sleep?no  P: Pressure - Do you have or are you being treated for high blood pressure?no  B: BMI - BMI greater than 35kg/m2?no  A: Age - Age over 50 years old?no  N: Neck - Neck circumference greater than 40 cm?no  G: Gender - Gender: Male?yes    Total number of \"YES\" responses:  1    Scoring: Low risk of ANIBAL 0-2  At Risk of ANIBAL: >3 High Risk of ANIBAL: 5-8    Eva Barnes LPN 2020 9:01 AM      Patient has a Health Care Directive or Living Will:  NO    1. NO - Do you have a history of heart attack, stroke, stent, bypass or surgery on an artery in the head, neck, heart or legs?  2. NO - Do you ever have any pain or discomfort in your chest?  3. NO - Do you have a history of Heart " Failure?  4. NO - Are you troubled by shortness of breath when: walking on the level, up a slight hill or at night?  5. NO - Do you currently have a cold, bronchitis or other respiratory infection?  6. NO - Do you have a cough, shortness of breath or wheezing?  7. NO - Do you sometimes get pains in the calves of your legs when you walk?  8. NO - Do you or anyone in your family have previous history of blood clots?  9. NO - Do you or does anyone in your family have a serious bleeding problem such as prolonged bleeding following surgeries or cuts?  10. NO - Have you ever had problems with anemia or been told to take iron pills?  11. NO - Have you had any abnormal blood loss such as black, tarry or bloody stools, or abnormal vaginal bleeding?  12. NO - Have you ever had a blood transfusion?  13. NO - Have you or any of your relatives ever had problems with anesthesia?  14. NO - Do you have sleep apnea, excessive snoring or daytime drowsiness?  15. NO - Do you have any prosthetic heart valves?  16. NO - Do you have prosthetic joints?  17. NO - Is there any chance that you may be pregnant?      HPI:     HPI related to upcoming procedure:   Patient injured his second toe on left foot on 4/22/2020 after stubbing it on a cough. Initially seen a proximal phalanx fracture on x-ray and on follow up x-ray further displacement was noted. Ortho states it is nonhealing and this point and is scheduled to have ORIF completed. Minimal pain. Does take Tylenol as needed. No numbness or tingling associated.       Pertinent Medical Problems:  None.     No recent cough or cold symptoms.  No fevers, chills, sore throat, ear pain.  No chest pain or palpitations, shortness of breath today.  No stomachaches, nausea, vomiting, constipation, dysuria, frequency, urgency, blood in stool or urine.        MEDICAL HISTORY:     Patient Active Problem List    Diagnosis Date Noted     Closed fracture of phalanx of left second toe with routine healing,  subsequent encounter 06/11/2020     Priority: Medium     Added automatically from request for surgery [id number]       Fetal alcohol syndrome 05/16/2019     Priority: Medium     Abdominal pain, left upper quadrant 12/08/2018     Priority: Medium     ADHD 02/01/2018     Priority: Medium     Overview:   also FAES (per patient)       Gastroesophageal reflux disease 08/23/2017     Priority: Medium     Other nonallopathic lesion of cervical region 08/17/2012     Priority: Medium     Nonallopathic lesion of thoracic region 08/17/2012     Priority: Medium     Constipation 07/14/2011     Priority: Medium     Viral warts 04/29/2011     Priority: Medium      Past Medical History:   Diagnosis Date     Attention-deficit hyperactivity disorder     No Comments Provided     Major depressive disorder, single episode     No Comments Provided     Personal history of other medical treatment (CODE)     No Comments Provided     Sleep deprivation     No Comments Provided     Past Surgical History:   Procedure Laterality Date     EXTRACTION(S) DENTAL N/A     x8; then braces placed     EXTRACTION(S) DENTAL      wisdom teeth     LACERATION REPAIR N/A 2015    to upper lip; done in OR     Current Outpatient Medications   Medication Sig Dispense Refill     cyclobenzaprine (FLEXERIL) 10 MG tablet Take 1 tablet (10 mg) by mouth 2 times daily as needed for muscle spasms 180 tablet 4     famotidine (PEPCID) 20 MG tablet TAKE 1 TABLET BY MOUTH ONCE DAILY IF NEEDED 90 tablet 4     nicotine (NICODERM CQ) 14 MG/24HR 24 hr patch Place 1 patch onto the skin every 24 hours 30 patch 2     order for DME Surgical shoe and crutches 1 Device 0     sildenafil (REVATIO) 20 MG tablet Take 1-2 tablets (20-40 mg) by mouth once as needed (ED) 15 tablet 2     OTC products: None, except as noted above    No Known Allergies   Latex Allergy: NO    Social History     Tobacco Use     Smoking status: Current Every Day Smoker     Packs/day: 0.10     Years: 8.00     Pack  "years: 0.80     Types: Cigarettes     Smokeless tobacco: Never Used     Tobacco comment: 4-5 a day-Quit smoking: trying to quit   Substance Use Topics     Alcohol use: Yes     Alcohol/week: 0.0 standard drinks     Frequency: 2-4 times a month     Drinks per session: 1 or 2     Comment: occasionally      History   Drug Use No       REVIEW OF SYSTEMS:   CONSTITUTIONAL: NEGATIVE for fever, chills, change in weight  INTEGUMENTARY/SKIN: NEGATIVE for worrisome rashes, moles or lesions  EYES: NEGATIVE for vision changes or irritation  ENT/MOUTH: NEGATIVE for ear, mouth and throat problems  RESP: NEGATIVE for significant cough or SOB  BREAST: NEGATIVE for masses, tenderness or discharge  CV: NEGATIVE for chest pain, palpitations or peripheral edema  GI: NEGATIVE for nausea, abdominal pain, heartburn, or change in bowel habits  : NEGATIVE for frequency, dysuria, or hematuria  MUSCULOSKELETAL: NEGATIVE for significant arthralgias or myalgia  NEURO: NEGATIVE for weakness, dizziness or paresthesias  ENDOCRINE: NEGATIVE for temperature intolerance, skin/hair changes  HEME: NEGATIVE for bleeding problems  PSYCHIATRIC: NEGATIVE for changes in mood or affect    EXAM:   /64   Pulse 68   Temp 97.1  F (36.2  C)   Resp 12   Ht 1.664 m (5' 5.5\")   Wt 87.3 kg (192 lb 6.4 oz)   SpO2 97%   BMI 31.53 kg/m    /64   Pulse 68   Temp 97.1  F (36.2  C)   Resp 12   Ht 1.664 m (5' 5.5\")   Wt 87.3 kg (192 lb 6.4 oz)   SpO2 97%   BMI 31.53 kg/m    General: Pleasant, in no apparent distress.  Eyes: Sclera are white and conjunctiva are clear bilaterally. Lacrimal apparatus free of erythema, edema, and discharge bilaterally.  Ears: External ears without erythema or edema. Tympanic membranes are pearly white and without erythema, scarring or perforations bilaterally. External auditory canals are free of foreign bodies, erythema, ulcers, and masses.  Nose: External nose is symmetrical and free of lesions and deformities. " Mucosa is soft pink and without erythema, edema, bleeding, or exudate. No septal perforation or deviation.  Oropharynx: Oral mucosa is pink and without ulcers, nodules, and white patches. Tongue is symmetrical, pink, and without masses or lesions. Pharynx is pink, symmetrical, and without lesions. Uvula is midline. Tonsils are pink, symmetrical, and without edema, ulcers, or exudates, and 1+ bilaterally.  Neck: No cervical lymphadenopathy on inspection and palpation.  Thyroid: Thyroid isthmus is palpable and midline. Lobes are palpable bilaterally but not enlarged.  Cardiovascular: Regular rate and rhythm with S1 equal to S2. No murmurs, friction rubs, or gallops.   Respiratory: Lungs are resonant and clear to auscultation bilaterally. No wheezes, crackles, or rhonchi.  Abdomen: Abdomen is non-distended and without bulging flanks, prominent venous markings, or ecchymosis. Active bowel sounds heard in all four quadrants. No tenderness to palpation in all four quadrants. No rebound tenderness or guarding. No palpable masses noted. No hepatosplenomegaly.  Musculoskeletal: Back is straight, no tenderness to palpation of paraspinal and paravertebral muscles. Full ROM of back, neck, upper and lower extremities.  Neurologic Exam: Non-focal, symmetric DTRs, normal gross motor, tone coordination and no visible tremor.  Skin: No jaundice, pallor, rashes, or lesions.  Psych: Appropriate mood and affect.        DIAGNOSTICS:   No EKG required given patient's health status and age.     Results for orders placed or performed in visit on 06/16/20   Hemoglobin     Status: None   Result Value Ref Range    Hemoglobin 16.3 13.3 - 17.7 g/dL         IMPRESSION:   Reason for surgery/procedure: Closed fracture of phalanx of left second toe   Diagnosis/reason for consult: pre op    The proposed surgical procedure is considered LOW risk.    REVISED CARDIAC RISK INDEX  The patient has the following serious cardiovascular risks for  perioperative complications such as (MI, PE, VFib and 3  AV Block):  No serious cardiac risks  INTERPRETATION: 0 risks: Class I (very low risk - 0.4% complication rate)    The patient has the following additional risks for perioperative complications:  No identified additional risks      ICD-10-CM    1. Preop general physical exam  Z01.818 Hemoglobin     Hemoglobin   2. Closed fracture of phalanx of left second toe with routine healing, subsequent encounter  S92.502D        RECOMMENDATIONS:         --Patient is to take all scheduled medications on the day of surgery EXCEPT for modifications listed below.    APPROVAL GIVEN to proceed with proposed procedure, without further diagnostic evaluation       Signed Electronically by: Maia Dodson PA-C    Copy of this evaluation report is provided to requesting physician.    [facility] Preop Guidelines    Revised Cardiac Risk Index

## 2020-06-16 NOTE — PROGRESS NOTES
"  [facility] AND \[facility] [place] [facility]\""  [address]  [place] [address]  [phone]  Dept: [phone]    PRE-OP EVALUATION:  Today's date: 2020    Phi Duncan (: 1995) presents for pre-operative evaluation assessment as requested by Dr. Singer.  He requires evaluation and anesthesia risk assessment prior to undergoing surgery/procedure for treatment of second toe fracture .    Nursing Notes:   Eva Barnes LPN  2020  9:07 AM  Sign at exiting of workspace  Patient presents to clinic for preop exam.  Date of Surgery: 2020   Type of Surgery: left 2nd toe ORIF vs, platar plate repair   Surgeon: Lucrecia   Hospital:  [facility]    Fever/Chills or other infectious symptoms in past month: no  >10lb weight loss in past two months: no  O2 SAT: 97    Health Care Directive/Code status:  no  Hx of blood transfusions:   no  Td up to date:  yes  History of VRE/MRSA:  Yes 6+years ago    Preoperative Evaluation: Obstructive Sleep Apnea screening    S: Snore -  Do you snore loudly? (louder than talking or loud enough to be heard through closed doors)no  T: Tired - Do you often feel tired, fatigued, or sleepy during the daytime?no  O: Observed - Has anyone ever observed you stop breathing during your sleep?no  P: Pressure - Do you have or are you being treated for high blood pressure?no  B: BMI - BMI greater than 35kg/m2?no  A: Age - Age over 50 years old?no  N: Neck - Neck circumference greater than 40 cm?no  G: Gender - Gender: Male?yes    Total number of \"YES\" responses:  1    Scoring: Low risk of ANIBAL 0-2  At Risk of ANIBAL: >3 High Risk of ANIBAL: 5-8    Eva Barnes LPN 2020 9:01 AM      Patient has a Health Care Directive or Living Will:  NO    1. NO - Do you have a history of heart attack, stroke, stent, bypass or surgery on an artery in the head, neck, heart or legs?  2. NO - Do you ever have any pain or discomfort in your chest?  3. NO - Do you have a history of Heart " Failure?  4. NO - Are you troubled by shortness of breath when: walking on the level, up a slight hill or at night?  5. NO - Do you currently have a cold, bronchitis or other respiratory infection?  6. NO - Do you have a cough, shortness of breath or wheezing?  7. NO - Do you sometimes get pains in the calves of your legs when you walk?  8. NO - Do you or anyone in your family have previous history of blood clots?  9. NO - Do you or does anyone in your family have a serious bleeding problem such as prolonged bleeding following surgeries or cuts?  10. NO - Have you ever had problems with anemia or been told to take iron pills?  11. NO - Have you had any abnormal blood loss such as black, tarry or bloody stools, or abnormal vaginal bleeding?  12. NO - Have you ever had a blood transfusion?  13. NO - Have you or any of your relatives ever had problems with anesthesia?  14. NO - Do you have sleep apnea, excessive snoring or daytime drowsiness?  15. NO - Do you have any prosthetic heart valves?  16. NO - Do you have prosthetic joints?  17. NO - Is there any chance that you may be pregnant?      HPI:     HPI related to upcoming procedure:   Patient injured his second toe on left foot on 4/22/2020 after stubbing it on a cough. Initially seen a proximal phalanx fracture on x-ray and on follow up x-ray further displacement was noted. Ortho states it is nonhealing and this point and is scheduled to have ORIF completed. Minimal pain. Does take Tylenol as needed. No numbness or tingling associated.       Pertinent Medical Problems:  None.     No recent cough or cold symptoms.  No fevers, chills, sore throat, ear pain.  No chest pain or palpitations, shortness of breath today.  No stomachaches, nausea, vomiting, constipation, dysuria, frequency, urgency, blood in stool or urine.        MEDICAL HISTORY:     Patient Active Problem List    Diagnosis Date Noted     Closed fracture of phalanx of left second toe with routine healing,  subsequent encounter 06/11/2020     Priority: Medium     Added automatically from request for surgery [id number]       Fetal alcohol syndrome 05/16/2019     Priority: Medium     Abdominal pain, left upper quadrant 12/08/2018     Priority: Medium     ADHD 02/01/2018     Priority: Medium     Overview:   also FAES (per patient)       Gastroesophageal reflux disease 08/23/2017     Priority: Medium     Other nonallopathic lesion of cervical region 08/17/2012     Priority: Medium     Nonallopathic lesion of thoracic region 08/17/2012     Priority: Medium     Constipation 07/14/2011     Priority: Medium     Viral warts 04/29/2011     Priority: Medium      Past Medical History:   Diagnosis Date     Attention-deficit hyperactivity disorder     No Comments Provided     Major depressive disorder, single episode     No Comments Provided     Personal history of other medical treatment (CODE)     No Comments Provided     Sleep deprivation     No Comments Provided     Past Surgical History:   Procedure Laterality Date     EXTRACTION(S) DENTAL N/A     x8; then braces placed     EXTRACTION(S) DENTAL      wisdom teeth     LACERATION REPAIR N/A 2015    to upper lip; done in OR     Current Outpatient Medications   Medication Sig Dispense Refill     cyclobenzaprine (FLEXERIL) 10 MG tablet Take 1 tablet (10 mg) by mouth 2 times daily as needed for muscle spasms 180 tablet 4     famotidine (PEPCID) 20 MG tablet TAKE 1 TABLET BY MOUTH ONCE DAILY IF NEEDED 90 tablet 4     nicotine (NICODERM CQ) 14 MG/24HR 24 hr patch Place 1 patch onto the skin every 24 hours 30 patch 2     order for DME Surgical shoe and crutches 1 Device 0     sildenafil (REVATIO) 20 MG tablet Take 1-2 tablets (20-40 mg) by mouth once as needed (ED) 15 tablet 2     OTC products: None, except as noted above    No Known Allergies   Latex Allergy: NO    Social History     Tobacco Use     Smoking status: Current Every Day Smoker     Packs/day: 0.10     Years: 8.00     Pack  "years: 0.80     Types: Cigarettes     Smokeless tobacco: Never Used     Tobacco comment: 4-5 a day-Quit smoking: trying to quit   Substance Use Topics     Alcohol use: Yes     Alcohol/week: 0.0 standard drinks     Frequency: 2-4 times a month     Drinks per session: 1 or 2     Comment: occasionally      History   Drug Use No       REVIEW OF SYSTEMS:   CONSTITUTIONAL: NEGATIVE for fever, chills, change in weight  INTEGUMENTARY/SKIN: NEGATIVE for worrisome rashes, moles or lesions  EYES: NEGATIVE for vision changes or irritation  ENT/MOUTH: NEGATIVE for ear, mouth and throat problems  RESP: NEGATIVE for significant cough or SOB  BREAST: NEGATIVE for masses, tenderness or discharge  CV: NEGATIVE for chest pain, palpitations or peripheral edema  GI: NEGATIVE for nausea, abdominal pain, heartburn, or change in bowel habits  : NEGATIVE for frequency, dysuria, or hematuria  MUSCULOSKELETAL: NEGATIVE for significant arthralgias or myalgia  NEURO: NEGATIVE for weakness, dizziness or paresthesias  ENDOCRINE: NEGATIVE for temperature intolerance, skin/hair changes  HEME: NEGATIVE for bleeding problems  PSYCHIATRIC: NEGATIVE for changes in mood or affect    EXAM:   /64   Pulse 68   Temp 97.1  F (36.2  C)   Resp 12   Ht 1.664 m (5' 5.5\")   Wt 87.3 kg (192 lb 6.4 oz)   SpO2 97%   BMI 31.53 kg/m    /64   Pulse 68   Temp 97.1  F (36.2  C)   Resp 12   Ht 1.664 m (5' 5.5\")   Wt 87.3 kg (192 lb 6.4 oz)   SpO2 97%   BMI 31.53 kg/m    General: Pleasant, in no apparent distress.  Eyes: Sclera are white and conjunctiva are clear bilaterally. Lacrimal apparatus free of erythema, edema, and discharge bilaterally.  Ears: External ears without erythema or edema. Tympanic membranes are pearly white and without erythema, scarring or perforations bilaterally. External auditory canals are free of foreign bodies, erythema, ulcers, and masses.  Nose: External nose is symmetrical and free of lesions and deformities. " Mucosa is soft pink and without erythema, edema, bleeding, or exudate. No septal perforation or deviation.  Oropharynx: Oral mucosa is pink and without ulcers, nodules, and white patches. Tongue is symmetrical, pink, and without masses or lesions. Pharynx is pink, symmetrical, and without lesions. Uvula is midline. Tonsils are pink, symmetrical, and without edema, ulcers, or exudates, and 1+ bilaterally.  Neck: No cervical lymphadenopathy on inspection and palpation.  Thyroid: Thyroid isthmus is palpable and midline. Lobes are palpable bilaterally but not enlarged.  Cardiovascular: Regular rate and rhythm with S1 equal to S2. No murmurs, friction rubs, or gallops.   Respiratory: Lungs are resonant and clear to auscultation bilaterally. No wheezes, crackles, or rhonchi.  Abdomen: Abdomen is non-distended and without bulging flanks, prominent venous markings, or ecchymosis. Active bowel sounds heard in all four quadrants. No tenderness to palpation in all four quadrants. No rebound tenderness or guarding. No palpable masses noted. No hepatosplenomegaly.  Musculoskeletal: Back is straight, no tenderness to palpation of paraspinal and paravertebral muscles. Full ROM of back, neck, upper and lower extremities.  Neurologic Exam: Non-focal, symmetric DTRs, normal gross motor, tone coordination and no visible tremor.  Skin: No jaundice, pallor, rashes, or lesions.  Psych: Appropriate mood and affect.        DIAGNOSTICS:   No EKG required given patient's health status and age.     Results for orders placed or performed in visit on 06/16/20   Hemoglobin     Status: None   Result Value Ref Range    Hemoglobin 16.3 13.3 - 17.7 g/dL         IMPRESSION:   Reason for surgery/procedure: Closed fracture of phalanx of left second toe   Diagnosis/reason for consult: pre op    The proposed surgical procedure is considered LOW risk.    REVISED CARDIAC RISK INDEX  The patient has the following serious cardiovascular risks for  perioperative complications such as (MI, PE, VFib and 3  AV Block):  No serious cardiac risks  INTERPRETATION: 0 risks: Class I (very low risk - 0.4% complication rate)    The patient has the following additional risks for perioperative complications:  No identified additional risks      ICD-10-CM    1. Preop general physical exam  Z01.818 Hemoglobin     Hemoglobin   2. Closed fracture of phalanx of left second toe with routine healing, subsequent encounter  S92.502D        RECOMMENDATIONS:         --Patient is to take all scheduled medications on the day of surgery EXCEPT for modifications listed below.    APPROVAL GIVEN to proceed with proposed procedure, without further diagnostic evaluation       Signed Electronically by: Maia Dodson PA-C    Copy of this evaluation report is provided to requesting physician.    [facility] Preop Guidelines    Revised Cardiac Risk Index

## 2020-06-17 ENCOUNTER — ANESTHESIA EVENT (OUTPATIENT)
Dept: SURGERY | Facility: OTHER | Age: 25
End: 2020-06-17
Payer: COMMERCIAL

## 2020-06-18 ENCOUNTER — ANESTHESIA (OUTPATIENT)
Dept: SURGERY | Facility: OTHER | Age: 25
End: 2020-06-18
Payer: COMMERCIAL

## 2020-06-18 ENCOUNTER — HOSPITAL ENCOUNTER (OUTPATIENT)
Facility: OTHER | Age: 25
Discharge: HOME | End: 2020-06-18
Attending: PODIATRIST | Admitting: PODIATRIST
Payer: COMMERCIAL

## 2020-06-18 ENCOUNTER — HOSPITAL ENCOUNTER (OUTPATIENT)
Dept: GENERAL RADIOLOGY | Facility: OTHER | Age: 25
End: 2020-06-18
Attending: PODIATRIST | Admitting: PODIATRIST
Payer: COMMERCIAL

## 2020-06-18 VITALS
DIASTOLIC BLOOD PRESSURE: 87 MMHG | RESPIRATION RATE: 16 BRPM | OXYGEN SATURATION: 95 % | HEART RATE: 56 BPM | TEMPERATURE: 97 F | SYSTOLIC BLOOD PRESSURE: 115 MMHG

## 2020-06-18 DIAGNOSIS — S92.502D CLOSED FRACTURE OF PHALANX OF LEFT SECOND TOE WITH ROUTINE HEALING, SUBSEQUENT ENCOUNTER: ICD-10-CM

## 2020-06-18 DIAGNOSIS — M79.672 LEFT FOOT PAIN: ICD-10-CM

## 2020-06-18 PROCEDURE — C1713 ANCHOR/SCREW BN/BN,TIS/BN: HCPCS | Performed by: PODIATRIST

## 2020-06-18 PROCEDURE — 27210794 ZZH OR GENERAL SUPPLY STERILE: Performed by: PODIATRIST

## 2020-06-18 PROCEDURE — 37000009 ZZH ANESTHESIA TECHNICAL FEE, EACH ADDTL 15 MIN: Performed by: PODIATRIST

## 2020-06-18 PROCEDURE — 25000125 ZZHC RX 250: Performed by: NURSE ANESTHETIST, CERTIFIED REGISTERED

## 2020-06-18 PROCEDURE — 40000306 ZZH STATISTIC PRE PROC ASSESS II: Performed by: PODIATRIST

## 2020-06-18 PROCEDURE — C1769 GUIDE WIRE: HCPCS | Performed by: PODIATRIST

## 2020-06-18 PROCEDURE — 27211024 ZZHC OR SUPPLY OTHER OPNP: Performed by: PODIATRIST

## 2020-06-18 PROCEDURE — 25000128 H RX IP 250 OP 636: Performed by: PODIATRIST

## 2020-06-18 PROCEDURE — 28124 PARTIAL REMOVAL OF TOE: CPT | Mod: XS | Performed by: PODIATRIST

## 2020-06-18 PROCEDURE — 25800030 ZZH RX IP 258 OP 636: Performed by: NURSE ANESTHETIST, CERTIFIED REGISTERED

## 2020-06-18 PROCEDURE — 36000058 ZZH SURGERY LEVEL 3 EA 15 ADDTL MIN: Performed by: PODIATRIST

## 2020-06-18 PROCEDURE — 36000060 ZZH SURGERY LEVEL 3 W FLUORO 1ST 30 MIN: Performed by: PODIATRIST

## 2020-06-18 PROCEDURE — 25000125 ZZHC RX 250: Performed by: PODIATRIST

## 2020-06-18 PROCEDURE — 40000278 XR SURGERY CARM FLUORO LESS THAN 5 MIN

## 2020-06-18 PROCEDURE — 37000008 ZZH ANESTHESIA TECHNICAL FEE, 1ST 30 MIN: Performed by: PODIATRIST

## 2020-06-18 PROCEDURE — 28313 REPAIR DEFORMITY OF TOE: CPT | Performed by: PODIATRIST

## 2020-06-18 PROCEDURE — 28313 REPAIR DEFORMITY OF TOE: CPT | Performed by: NURSE ANESTHETIST, CERTIFIED REGISTERED

## 2020-06-18 PROCEDURE — 27810325 ZZHC OR IMPLANT OTHER OPNP: Performed by: PODIATRIST

## 2020-06-18 PROCEDURE — 71000027 ZZH RECOVERY PHASE 2 EACH 15 MINS: Performed by: PODIATRIST

## 2020-06-18 PROCEDURE — 25000128 H RX IP 250 OP 636: Performed by: NURSE ANESTHETIST, CERTIFIED REGISTERED

## 2020-06-18 DEVICE — IMPLANTABLE DEVICE: Type: IMPLANTABLE DEVICE | Site: ANKLE | Status: FUNCTIONAL

## 2020-06-18 RX ORDER — FENTANYL CITRATE 50 UG/ML
25-50 INJECTION, SOLUTION INTRAMUSCULAR; INTRAVENOUS
Status: DISCONTINUED | OUTPATIENT
Start: 2020-06-18 | End: 2020-06-18 | Stop reason: HOSPADM

## 2020-06-18 RX ORDER — CEFAZOLIN SODIUM 1 G/50ML
1 INJECTION, SOLUTION INTRAVENOUS SEE ADMIN INSTRUCTIONS
Status: DISCONTINUED | OUTPATIENT
Start: 2020-06-18 | End: 2020-06-18 | Stop reason: HOSPADM

## 2020-06-18 RX ORDER — HYDROCODONE BITARTRATE AND ACETAMINOPHEN 5; 325 MG/1; MG/1
1 TABLET ORAL EVERY 6 HOURS PRN
Qty: 20 TABLET | Refills: 0 | Status: SHIPPED | OUTPATIENT
Start: 2020-06-18 | End: 2020-08-07

## 2020-06-18 RX ORDER — CEFAZOLIN SODIUM 2 G/100ML
2 INJECTION, SOLUTION INTRAVENOUS
Status: COMPLETED | OUTPATIENT
Start: 2020-06-18 | End: 2020-06-18

## 2020-06-18 RX ORDER — PROPOFOL 10 MG/ML
INJECTION, EMULSION INTRAVENOUS CONTINUOUS PRN
Status: DISCONTINUED | OUTPATIENT
Start: 2020-06-18 | End: 2020-06-18

## 2020-06-18 RX ORDER — ONDANSETRON 4 MG/1
4 TABLET, ORALLY DISINTEGRATING ORAL EVERY 30 MIN PRN
Status: DISCONTINUED | OUTPATIENT
Start: 2020-06-18 | End: 2020-06-18 | Stop reason: HOSPADM

## 2020-06-18 RX ORDER — KETAMINE HYDROCHLORIDE 10 MG/ML
INJECTION INTRAMUSCULAR; INTRAVENOUS PRN
Status: DISCONTINUED | OUTPATIENT
Start: 2020-06-18 | End: 2020-06-18

## 2020-06-18 RX ORDER — LIDOCAINE 40 MG/G
CREAM TOPICAL
Status: DISCONTINUED | OUTPATIENT
Start: 2020-06-18 | End: 2020-06-18 | Stop reason: HOSPADM

## 2020-06-18 RX ORDER — SODIUM CHLORIDE, SODIUM LACTATE, POTASSIUM CHLORIDE, CALCIUM CHLORIDE 600; 310; 30; 20 MG/100ML; MG/100ML; MG/100ML; MG/100ML
INJECTION, SOLUTION INTRAVENOUS CONTINUOUS
Status: DISCONTINUED | OUTPATIENT
Start: 2020-06-18 | End: 2020-06-18 | Stop reason: HOSPADM

## 2020-06-18 RX ORDER — MEPERIDINE HYDROCHLORIDE 50 MG/ML
12.5 INJECTION INTRAMUSCULAR; INTRAVENOUS; SUBCUTANEOUS
Status: DISCONTINUED | OUTPATIENT
Start: 2020-06-18 | End: 2020-06-18 | Stop reason: HOSPADM

## 2020-06-18 RX ORDER — HYDROMORPHONE HYDROCHLORIDE 1 MG/ML
.3-.5 INJECTION, SOLUTION INTRAMUSCULAR; INTRAVENOUS; SUBCUTANEOUS EVERY 10 MIN PRN
Status: DISCONTINUED | OUTPATIENT
Start: 2020-06-18 | End: 2020-06-18 | Stop reason: HOSPADM

## 2020-06-18 RX ORDER — FENTANYL CITRATE 50 UG/ML
INJECTION, SOLUTION INTRAMUSCULAR; INTRAVENOUS PRN
Status: DISCONTINUED | OUTPATIENT
Start: 2020-06-18 | End: 2020-06-18

## 2020-06-18 RX ORDER — ONDANSETRON 2 MG/ML
INJECTION INTRAMUSCULAR; INTRAVENOUS PRN
Status: DISCONTINUED | OUTPATIENT
Start: 2020-06-18 | End: 2020-06-18

## 2020-06-18 RX ORDER — NALOXONE HYDROCHLORIDE 0.4 MG/ML
.1-.4 INJECTION, SOLUTION INTRAMUSCULAR; INTRAVENOUS; SUBCUTANEOUS
Status: DISCONTINUED | OUTPATIENT
Start: 2020-06-18 | End: 2020-06-18 | Stop reason: HOSPADM

## 2020-06-18 RX ORDER — ONDANSETRON 2 MG/ML
4 INJECTION INTRAMUSCULAR; INTRAVENOUS EVERY 30 MIN PRN
Status: DISCONTINUED | OUTPATIENT
Start: 2020-06-18 | End: 2020-06-18 | Stop reason: HOSPADM

## 2020-06-18 RX ORDER — PROPOFOL 10 MG/ML
INJECTION, EMULSION INTRAVENOUS PRN
Status: DISCONTINUED | OUTPATIENT
Start: 2020-06-18 | End: 2020-06-18

## 2020-06-18 RX ORDER — OXYCODONE HYDROCHLORIDE 5 MG/1
5 TABLET ORAL
Status: DISCONTINUED | OUTPATIENT
Start: 2020-06-18 | End: 2020-06-18 | Stop reason: HOSPADM

## 2020-06-18 RX ORDER — LIDOCAINE HYDROCHLORIDE 20 MG/ML
INJECTION, SOLUTION INFILTRATION; PERINEURAL PRN
Status: DISCONTINUED | OUTPATIENT
Start: 2020-06-18 | End: 2020-06-18

## 2020-06-18 RX ADMIN — FENTANYL CITRATE 75 MCG: 50 INJECTION, SOLUTION INTRAMUSCULAR; INTRAVENOUS at 12:20

## 2020-06-18 RX ADMIN — CEFAZOLIN SODIUM 2 G: 2 INJECTION, SOLUTION INTRAVENOUS at 12:06

## 2020-06-18 RX ADMIN — Medication 30 MG: at 12:02

## 2020-06-18 RX ADMIN — LIDOCAINE HYDROCHLORIDE 0.2 ML: 10 INJECTION, SOLUTION EPIDURAL; INFILTRATION; INTRACAUDAL; PERINEURAL at 10:39

## 2020-06-18 RX ADMIN — ONDANSETRON 4 MG: 2 INJECTION INTRAMUSCULAR; INTRAVENOUS at 12:07

## 2020-06-18 RX ADMIN — FENTANYL CITRATE 25 MCG: 50 INJECTION, SOLUTION INTRAMUSCULAR; INTRAVENOUS at 12:02

## 2020-06-18 RX ADMIN — LIDOCAINE HYDROCHLORIDE 40 MG: 20 INJECTION, SOLUTION INFILTRATION; PERINEURAL at 12:03

## 2020-06-18 RX ADMIN — PROPOFOL 125 MCG/KG/MIN: 10 INJECTION, EMULSION INTRAVENOUS at 12:03

## 2020-06-18 RX ADMIN — SODIUM CHLORIDE, POTASSIUM CHLORIDE, SODIUM LACTATE AND CALCIUM CHLORIDE: 600; 310; 30; 20 INJECTION, SOLUTION INTRAVENOUS at 10:39

## 2020-06-18 RX ADMIN — PROPOFOL 100 MG: 10 INJECTION, EMULSION INTRAVENOUS at 12:03

## 2020-06-18 RX ADMIN — MIDAZOLAM 2 MG: 1 INJECTION INTRAMUSCULAR; INTRAVENOUS at 12:02

## 2020-06-18 SDOH — HEALTH STABILITY: MENTAL HEALTH: CURRENT SMOKER: 1

## 2020-06-18 ASSESSMENT — LIFESTYLE VARIABLES: TOBACCO_USE: 1

## 2020-06-18 NOTE — ANESTHESIA CARE TRANSFER NOTE
Patient: Phi Duncan    Procedure(s):  left 2nd toe ORIF, plantar plate repair, and excision of fracture fragment    Diagnosis: Closed fracture of phalanx of left second toe with routine healing, subsequent encounter [N45.454U]  Diagnosis Additional Information: No value filed.    Anesthesia Type:   MAC     Note:  Airway :Room Air  Patient transferred to:Phase II  Handoff Report: Identifed the Patient, Identified the Reponsible Provider, Reviewed the pertinent medical history, Discussed the surgical course, Reviewed Intra-OP anesthesia mangement and issues during anesthesia, Set expectations for post-procedure period and Allowed opportunity for questions and acknowledgement of understanding      Vitals: (Last set prior to Anesthesia Care Transfer)    CRNA VITALS  6/18/2020 1232 - 6/18/2020 1304      6/18/2020             Pulse:  77    Ht Rate:  77    SpO2:  94 %    Resp Rate (set):  10                Electronically Signed By: BARRON GAMBINO CRNA  June 18, 2020  1:04 PM

## 2020-06-18 NOTE — BRIEF OP NOTE
[facility]    Brief Operative Note    Pre-operative diagnosis: Closed fracture of phalanx of left second toe with routine healing, subsequent encounter [J67.503Z]  Post-operative diagnosis Same as pre-operative diagnosis    Procedure: Procedure(s):  left 2nd toe ORIF, plantar plate repair, and excision of fracture fragment  Surgeon: Surgeon(s) and Role:     * Avinash Singer DPM - Primary     * Mykel Brennan PA - Assisting  Anesthesia: Combined MAC with Local   Estimated blood loss: Minimal  Drains: None  Specimens: * No specimens in log *  Findings:   None.  Complications: None.  Implants:   Implant Name Type Inv. Item Serial No.  Lot No. LRB No. Used Action   Implant system, CPR mini scorpion    [employer] [id number] Left 1 Implanted   2.4mm low profile screw titanium      Left 1 Implanted and Explanted   2.4mm low profile screw, titanium      Left 1 Implanted and Explanted

## 2020-06-18 NOTE — ANESTHESIA PREPROCEDURE EVALUATION
Anesthesia Pre-Procedure Evaluation    Patient: Phi Duncan   MRN: [id number] : 1995          Preoperative Diagnosis: Closed fracture of phalanx of left second toe with routine healing, subsequent encounter [S91.967D]    Procedure(s):  left 2nd toe ORIF vs, platar plate repair    Past Medical History:   Diagnosis Date     Attention-deficit hyperactivity disorder     No Comments Provided     Major depressive disorder, single episode     No Comments Provided     Personal history of other medical treatment (CODE)     No Comments Provided     Sleep deprivation     No Comments Provided     Past Surgical History:   Procedure Laterality Date     EXTRACTION(S) DENTAL N/A     x8; then braces placed     EXTRACTION(S) DENTAL      wisdom teeth     LACERATION REPAIR N/A 2015    to upper lip; done in OR       Anesthesia Evaluation     . Pt has had prior anesthetic. Type: General           ROS/MED HX    ENT/Pulmonary:     (+)ANIBAL risk factors snores loudly, tobacco use, Current use 5 cigs per day packs/day  , . .    Neurologic:  - neg neurologic ROS     Cardiovascular:  - neg cardiovascular ROS       METS/Exercise Tolerance:  >4 METS   Hematologic:  - neg hematologic  ROS       Musculoskeletal:  - neg musculoskeletal ROS       GI/Hepatic: Comment: Only gets heartburn with spicy foods        Renal/Genitourinary:  - ROS Renal section negative       Endo:  - neg endo ROS       Psychiatric:     (+) psychiatric history other (comment) (ADHD)      Infectious Disease:  - neg infectious disease ROS       Malignancy:      - no malignancy   Other:    - neg other ROS                      Physical Exam  Normal systems: pulmonary and dental    Airway   Mallampati: I  TM distance: >3 FB  Neck ROM: full    Dental     Cardiovascular   Rhythm and rate: regular and normal      Pulmonary             Lab Results   Component Value Date    HGB 16.3 2020    HCT 43.2 01/15/2018     01/15/2018     01/15/2018    POTASSIUM 4.0  "01/15/2018    CHLORIDE 110 (H) 01/15/2018    CO2 23 01/15/2018    BUN 14 01/15/2018    CR 0.83 01/15/2018    GLC 99 01/15/2018    LAURITA 8.3 (L) 01/15/2018    MAG 1.8 (L) 08/04/2017    ALBUMIN 4.0 01/15/2018    PROTTOTAL 6.6 01/15/2018    ALKPHOS 72 01/15/2018    BILITOTAL 0.5 01/15/2018    LIPASE 12.1 08/04/2017       Preop Vitals  BP Readings from Last 3 Encounters:   06/18/20 127/80   06/16/20 118/64   06/11/20 98/72    Pulse Readings from Last 3 Encounters:   06/18/20 65   06/16/20 68   06/11/20 60      Resp Readings from Last 3 Encounters:   06/18/20 16   06/16/20 12   06/04/20 12    SpO2 Readings from Last 3 Encounters:   06/18/20 94%   06/16/20 97%   04/24/20 97%      Temp Readings from Last 1 Encounters:   06/16/20 97.1  F (36.2  C)    Ht Readings from Last 1 Encounters:   06/16/20 1.664 m (5' 5.5\")      Wt Readings from Last 1 Encounters:   06/16/20 87.3 kg (192 lb 6.4 oz)    Estimated body mass index is 31.53 kg/m  as calculated from the following:    Height as of 6/16/20: 1.664 m (5' 5.5\").    Weight as of 6/16/20: 87.3 kg (192 lb 6.4 oz).       Anesthesia Plan      History & Physical Review      ASA Status:  2 .    NPO Status:  > 4 hours    Plan for MAC (agrees to GA if needed LMA ok)   PONV prophylaxis:  Ondansetron (or other 5HT-3) and Dexamethasone or Solumedrol    The patient is a current Smoker     Postoperative Care  Postoperative pain management:  IV analgesics and Multi-modal analgesia.      Consents  Anesthetic plan, risks, benefits and alternatives discussed with:  Patient..                 BARRON GAMBINO CRNA  "

## 2020-06-18 NOTE — OR NURSING
Patient instructed on weight bearing as tolerated, demonstrated technique using walker when ambulating to bathroom, steady on feet, tolerated well.  Dr. Singer visited with patient prior to discharge.  
no

## 2020-06-18 NOTE — ANESTHESIA POSTPROCEDURE EVALUATION
Patient: Phi Duncan    Procedure(s):  left 2nd toe ORIF, plantar plate repair, and excision of fracture fragment    Diagnosis:Closed fracture of phalanx of left second toe with routine healing, subsequent encounter [A79.775A]  Diagnosis Additional Information: No value filed.    Anesthesia Type:  MAC    Note:  Anesthesia Post Evaluation    Patient location during evaluation: Phase 2  Patient participation: Able to fully participate in evaluation  Level of consciousness: awake and alert  Pain management: adequate  Airway patency: patent  Cardiovascular status: acceptable  Respiratory status: acceptable  Hydration status: acceptable  PONV: none             Last vitals:  Vitals:    06/18/20 1320 06/18/20 1330 06/18/20 1345   BP: 102/65 108/72 92/55   Pulse: 60 69 55   Resp: 16 16 16   Temp:      SpO2: 93% 95% 96%         Electronically Signed By: BARRON GAMBINO CRNA  June 18, 2020  2:22 PM

## 2020-06-18 NOTE — DISCHARGE INSTRUCTIONS
Surgeon Contact Information  If you have questions or concerns related to your procedure please contact your surgeon through [facility] at [phone].      [facility]  Adult Discharge Orders & Instructions      For 24 hours after surgery:  1. Get plenty of rest.  A responsible adult must stay with you for at least 24 hours after you leave the hospital.   2. You may feel lightheaded.  IF so, sit for a few minutes before standing.  Have someone help you get up.   3. You may have a slight fever. Call the doctor if your fever is over 101 F (38.3 C) (taken under the tongue) or lasts longer than 24 hours.  4. You may have a dry mouth, a sore throat, muscle aches or trouble sleeping.  These should go away after 24 hours.  5. Do not make important or legal decisions.  6.   Do not drive or use heavy equipment.  If you have weakness or tingling, don't drive or use heavy equipment until this feeling goes away.                                                                                                                                                                         To contact a doctor, call    [phone]______________

## 2020-06-18 NOTE — OP NOTE
Procedure Date: 06/18/2020      SURGEON:  Avinash Singer DPM      ASSISTANT:  Mykel Brennan PA-C.  A skilled first assistant was necessary due to technical complexity of the procedure and for the patient's safety.  The assistant helped with positioning, retraction, visualization of the operative field and moreover helped to complete the procedure in a technically safe and efficient manner.        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      PROCEDURE:  Fracture fragment excision of base of proximal phalanx and plantar plate or capsular repair of the second MPJ.      ANESTHESIA:  General with local.      HEMOSTASIS:  Ankle tourniquet, electrocautery.      ESTIMATED BLOOD LOSS:  Minimal.      MATERIALS:  1.3 mm suture tape.      INDICATIONS FOR PROCEDURE:  This is a 24-year-old with a fairly large avulsion at the base of the medial aspect of the proximal phalanx involving the MPJ.  He has pain with it for quite some time now and elected to proceed with surgery after detailed discussion of risks, potential complications, alternatives and benefits.      DESCRIPTION OF PROCEDURE:  Supine position utilized, anesthesia and local block performed.  Left lower extremity prepped and draped in the usual sterile fashion.  Ankle tourniquet utilized for duration of procedure.      Attention was directed to the dorsal second toe.  Incision made spanning the MPJ, deepened using blunt and sharp dissection.  Fracture fragment which was nonunited was exposed.  Attempted reduction and a lag screw here, unfortunately, in the split there was very small fragment medially.  Original plan was likely excise this and do a capsular and stabilization or repair given the size of it, but once I got in there, it did seem large enough for a possible small screw  but, unfortunately, the drill did split the small fracture fragment, so we did excise and it was removed in total as it was a source of pain.  A 1.3 suture tape was then tunneled through the base of the proximal phalanx.  I grabbed the medial capsule and plantar plate.  This was then tightened down after being run through the tunnels of the base of the proximal phalanx on the lateral side of the phalanx, effectively pulling the plantar plate to the base of the proximal phalanx and stabilizing this joint in appropriate position.        This was flushed with copious amounts of normal saline.  Deep tissue repaired with 2-0 Vicryl, skin nonabsorbable suture, placed in a sterile dressing and a postop Cam shoe.  Weightbear as tolerated and follow-up as scheduled.         TOO MARMOLEJO DPM             D: 2020   T: 2020   MT: VALENTE      Name:     NIKOLAS DE LA TORRE   MRN:      [id number]        Account:        [id number]   :      1995           Procedure Date: 2020      Document: [id number]

## 2020-06-25 ENCOUNTER — OFFICE VISIT (OUTPATIENT)
Dept: ORTHOPEDICS | Facility: OTHER | Age: 25
End: 2020-06-25
Attending: PODIATRIST
Payer: COMMERCIAL

## 2020-06-25 DIAGNOSIS — Z09 POSTOP CHECK: Primary | ICD-10-CM

## 2020-06-25 PROCEDURE — 99024 POSTOP FOLLOW-UP VISIT: CPT | Performed by: PODIATRIST

## 2020-06-25 PROCEDURE — G0463 HOSPITAL OUTPT CLINIC VISIT: HCPCS

## 2020-06-25 NOTE — PROGRESS NOTES
Visit Date:   2020      ORTHOPEDIC FOLLOWUP VISIT      Nikolas is here a week out from treatment of second toe base fracture with excision of the base of proximal phalanx and plantar plate repair.  He is doing well, no acute concerns, has improved symptoms already.      PHYSICAL EXAMINATION:  Surgical site well-healed.  Sutures applied.  Steri-Stripes applied.  CMS is intact.  No signs of infection.  Good position of the second toe.      ASSESSMENT:  Status post left foot second toe surgery as described.      PLAN:  I discussed progression of treatment.  The patient is doing well.  He will continue in his boot for the next 2-4 weeks and slowly progress out in 2 weeks.  Weightbear as tolerated.  I will see him in 4 weeks.  We will get x-rays at that time and hopefully release from there if doing well.         TOO MARMOLEJO DPM             D: 2020   T: 2020   MT: VALENTE      Name:     NIKOLAS DE LA TORRE   MRN:      [id number]        Account:      [id number]   :      1995           Visit Date:   2020      Document: [id number]

## 2020-06-25 NOTE — PROGRESS NOTES
Patient is here for follow up on his left foot surgery.   Montserrat Tyson LPN .....................6/25/2020 1:11 PM

## 2020-08-07 ENCOUNTER — OFFICE VISIT (OUTPATIENT)
Dept: FAMILY MEDICINE | Facility: OTHER | Age: 25
End: 2020-08-07
Attending: PHYSICIAN ASSISTANT
Payer: COMMERCIAL

## 2020-08-07 VITALS
OXYGEN SATURATION: 97 % | BODY MASS INDEX: 31.72 KG/M2 | HEIGHT: 65 IN | DIASTOLIC BLOOD PRESSURE: 62 MMHG | TEMPERATURE: 97.5 F | SYSTOLIC BLOOD PRESSURE: 110 MMHG | HEART RATE: 82 BPM | WEIGHT: 190.4 LBS | RESPIRATION RATE: 16 BRPM

## 2020-08-07 DIAGNOSIS — A63.0 ANOGENITAL (VENEREAL) WARTS: Primary | ICD-10-CM

## 2020-08-07 PROCEDURE — G0463 HOSPITAL OUTPT CLINIC VISIT: HCPCS

## 2020-08-07 PROCEDURE — 99213 OFFICE O/P EST LOW 20 MIN: CPT | Performed by: PHYSICIAN ASSISTANT

## 2020-08-07 RX ORDER — IMIQUIMOD 12.5 MG/.25G
CREAM TOPICAL
Qty: 12 PACKET | Refills: 3 | Status: SHIPPED | OUTPATIENT
Start: 2020-08-07 | End: 2022-02-22

## 2020-08-07 ASSESSMENT — PAIN SCALES - GENERAL: PAINLEVEL: NO PAIN (0)

## 2020-08-07 ASSESSMENT — MIFFLIN-ST. JEOR: SCORE: 1770.53

## 2020-08-07 NOTE — PROGRESS NOTES
Chief Complaint   Patient presents with     Derm Problem   Patient presents with bump on scrotum. Patient shaved and now has a bump. Patient also has a couple of raised areas on penis.    Medication Reconciliation complete.   Di Chandler LPN  ..................8/7/2020   5:00 PM

## 2020-08-07 NOTE — PROGRESS NOTES
"SUBJECTIVE:   Phi Duncan is a 24 year old male here for the following health issues:    HPI  Pimple at the base of his scrotum as well as bumps on the shaft of his penis..  Patient states he is more like lesions appeared on my penis few months ago.  He also states he shaved within the last week and noted several small pimples.  1 of the pimples became larger than the others and more painful.  He tried to pop it however was unable to get any pus or exudate out of the table.  He now describes it as a boil.  No fevers, chills, night sweats, abdominal pain, testicular pain, pain with ejaculation, or other concerns or complaints.    Allergies:  No Known Allergies    Review of Systems   As above otherwise ROS is unremarkable.     OBJECTIVE:     Vitals:    08/07/20 1701   BP: 110/62   BP Location: Right arm   Patient Position: Sitting   Cuff Size: Adult Regular   Pulse: 82   Resp: 16   Temp: 97.5  F (36.4  C)   TempSrc: Tympanic   SpO2: 97%   Weight: 86.4 kg (190 lb 6.4 oz)   Height: 1.651 m (5' 5\")       Physical Exam  General Appearance: Pleasant, alert, appropriate appearance for age and circumstances, no acute distress  : Healing pimple at the base of his scrotum, without erythema, warmth, positive for mild tenderness to palpation, without pus or exudate.  2 wartlike lesions in the right lateral side of the distal penile shaft.  Nontender to palpation, nonerythematous, or flaking.  Psychiatric Exam: Alert and oriented, appropriate affect    ASSESSMENT/PLAN:     1. Anogenital (venereal) warts        Imiquimod cream 3 times weekly at bedtime    Lesion at the base of the scrotum near the perineum appears to be a healing pimple.  No signs of infection, boil, or abscess formation.    Follow-up if symptoms persist or worsen.    No orders of the defined types were placed in this encounter.      JESSICA Elaine  [facility]    This document was prepared using voice generated software.  While every " attempt was made for accuracy, grammatical errors may exist.

## 2020-08-18 ENCOUNTER — OFFICE VISIT (OUTPATIENT)
Dept: FAMILY MEDICINE | Facility: OTHER | Age: 25
End: 2020-08-18
Attending: FAMILY MEDICINE
Payer: COMMERCIAL

## 2020-08-18 VITALS
BODY MASS INDEX: 32.32 KG/M2 | SYSTOLIC BLOOD PRESSURE: 122 MMHG | OXYGEN SATURATION: 96 % | RESPIRATION RATE: 16 BRPM | WEIGHT: 194.25 LBS | HEART RATE: 80 BPM | TEMPERATURE: 97 F | DIASTOLIC BLOOD PRESSURE: 70 MMHG

## 2020-08-18 DIAGNOSIS — G47.19 EXCESSIVE DAYTIME SLEEPINESS: ICD-10-CM

## 2020-08-18 DIAGNOSIS — K21.9 GASTROESOPHAGEAL REFLUX DISEASE, ESOPHAGITIS PRESENCE NOT SPECIFIED: ICD-10-CM

## 2020-08-18 DIAGNOSIS — N52.9 ERECTILE DYSFUNCTION, UNSPECIFIED ERECTILE DYSFUNCTION TYPE: ICD-10-CM

## 2020-08-18 DIAGNOSIS — Z01.812 PRE-PROCEDURE LAB EXAM: ICD-10-CM

## 2020-08-18 DIAGNOSIS — R06.83 LOUD SNORING: Primary | ICD-10-CM

## 2020-08-18 PROCEDURE — G0463 HOSPITAL OUTPT CLINIC VISIT: HCPCS

## 2020-08-18 PROCEDURE — 99214 OFFICE O/P EST MOD 30 MIN: CPT | Performed by: FAMILY MEDICINE

## 2020-08-18 RX ORDER — FAMOTIDINE 20 MG/1
TABLET, FILM COATED ORAL
Qty: 90 TABLET | Refills: 4 | Status: SHIPPED | OUTPATIENT
Start: 2020-08-18 | End: 2022-06-02

## 2020-08-18 RX ORDER — SILDENAFIL CITRATE 20 MG/1
60-80 TABLET ORAL
Qty: 30 TABLET | Refills: 2 | Status: SHIPPED | OUTPATIENT
Start: 2020-08-18 | End: 2022-02-22

## 2020-08-18 ASSESSMENT — ENCOUNTER SYMPTOMS
SINUS PRESSURE: 0
WEAKNESS: 0
BRUISES/BLEEDS EASILY: 0
DIARRHEA: 0
CHILLS: 0
ACTIVITY CHANGE: 0
APPETITE CHANGE: 0
ADENOPATHY: 0
ABDOMINAL PAIN: 0
COUGH: 0
SINUS PAIN: 0
SORE THROAT: 0
FEVER: 0
CHEST TIGHTNESS: 0
DIZZINESS: 0
HEARTBURN: 0
WOUND: 0
FATIGUE: 0
TROUBLE SWALLOWING: 0

## 2020-08-18 ASSESSMENT — PAIN SCALES - GENERAL: PAINLEVEL: NO PAIN (0)

## 2020-08-18 NOTE — LETTER
August 18, 2020      Phi JONES Davidscooby  [address] [address]        To Whom It May Concern,     Please excuse Phi from work, as he was seen in the clinic today.  OK to return to work after appointment without restrictions.        Sincerely,          Yomaira Faulkner, DO  Family Practice

## 2020-08-18 NOTE — NURSING NOTE
"Chief Complaint   Patient presents with     Snoring       Initial /70   Pulse 80   Temp 97  F (36.1  C) (Tympanic)   Resp 16   Wt 88.1 kg (194 lb 4 oz)   SpO2 96%   BMI 32.32 kg/m   Estimated body mass index is 32.32 kg/m  as calculated from the following:    Height as of 8/7/20: 1.651 m (5' 5\").    Weight as of this encounter: 88.1 kg (194 lb 4 oz).  Medication Reconciliation: complete    Shaye Yoon LPN  "

## 2020-08-18 NOTE — PROGRESS NOTES
SUBJECTIVE:   Phi Duncan is a 24 year old male who presents to clinic today for the following health issues:    HPI  Phi is here for concerns of excessive snoring.   ESS: 7  Reporting it is waking everyone up - significant other, and son.  Even with door shut.  No witnessed apnea.  + daytime fatigue, even initial.  Flat bed; 1 pillow.  Uses caffeine in the morning to help wake up; can get headaches without.  Other times - random throbbing type headaches, but not daily.    Patient Active Problem List    Diagnosis Date Noted     Closed fracture of phalanx of left second toe with routine healing, subsequent encounter 06/11/2020     Priority: Medium     Added automatically from request for surgery [id number]       Fetal alcohol syndrome 05/16/2019     Priority: Medium     Abdominal pain, left upper quadrant 12/08/2018     Priority: Medium     ADHD 02/01/2018     Priority: Medium     Overview:   also FAES (per patient)       Gastroesophageal reflux disease 08/23/2017     Priority: Medium     Other nonallopathic lesion of cervical region 08/17/2012     Priority: Medium     Nonallopathic lesion of thoracic region 08/17/2012     Priority: Medium     Constipation 07/14/2011     Priority: Medium     Viral warts 04/29/2011     Priority: Medium     Past Medical History:   Diagnosis Date     Attention-deficit hyperactivity disorder     No Comments Provided     Major depressive disorder, single episode     No Comments Provided     Personal history of other medical treatment (CODE)     No Comments Provided     Sleep deprivation     No Comments Provided      Past Surgical History:   Procedure Laterality Date     EXTRACTION(S) DENTAL N/A     x8; then braces placed     EXTRACTION(S) DENTAL      wisdom teeth     LACERATION REPAIR N/A 2015    to upper lip; done in OR     OPEN REDUCTION INTERNAL FIXATION TOE(S) Left 6/18/2020    Procedure: left 2nd toe ORIF, plantar plate repair, and excision of fracture fragment;  Surgeon:  Avinash Singer DPM;  Location:  OR     Family History   Problem Relation Age of Onset     Diabetes Mother      Cancer Mother         Cancer,Unsure type- in pelvis     Alcoholism Father      No Known Problems Sister      Social History     Tobacco Use     Smoking status: Current Every Day Smoker     Packs/day: 0.10     Years: 8.00     Pack years: 0.80     Types: Cigarettes     Smokeless tobacco: Never Used     Tobacco comment: 4-5 a day-Quit smoking: trying to quit   Substance Use Topics     Alcohol use: Yes     Alcohol/week: 0.0 standard drinks     Frequency: 2-4 times a month     Drinks per session: 1 or 2     Comment: occasionally      Social History     Social History Narrative    Previously in residential care at [facility]. He reports he got there by stealing an Ipod.  Currently living in a foster home on [facility].  He loves to fish.    Works at [employer]     Current Outpatient Medications   Medication Sig Dispense Refill     cyclobenzaprine (FLEXERIL) 10 MG tablet Take 1 tablet (10 mg) by mouth 2 times daily as needed for muscle spasms 180 tablet 4     famotidine (PEPCID) 20 MG tablet TAKE 1 TABLET BY MOUTH ONCE DAILY IF NEEDED 90 tablet 4     imiquimod (ALDARA) 5 % external cream Apply a small sized amount to warts or molluscum three times weekly at bedtime.   Wash off after 8 hours.   May use for up to 16 weeks. 12 packet 3     nicotine (NICODERM CQ) 14 MG/24HR 24 hr patch Place 1 patch onto the skin every 24 hours 30 patch 2     order for DME Surgical shoe and crutches 1 Device 0     sildenafil (REVATIO) 20 MG tablet Take 1-2 tablets (20-40 mg) by mouth once as needed (ED) 15 tablet 2     No Known Allergies    Review of Systems   Constitutional: Negative for activity change, appetite change, chills, fatigue and fever.   HENT: Negative for congestion, sinus pressure, sinus pain, sore throat and trouble swallowing.    Respiratory: Negative for cough and chest tightness.    Cardiovascular: Negative  for chest pain and peripheral edema.   Gastrointestinal: Negative for abdominal pain, diarrhea and heartburn.   Skin: Negative for rash and wound.   Neurological: Negative for dizziness, syncope and weakness.   Hematological: Negative for adenopathy. Does not bruise/bleed easily.   All other systems reviewed and are negative.     OBJECTIVE:     /70   Pulse 80   Temp 97  F (36.1  C) (Tympanic)   Resp 16   Wt 88.1 kg (194 lb 4 oz)   SpO2 96%   BMI 32.32 kg/m    Body mass index is 32.32 kg/m .  Physical Exam  Vitals signs and nursing note reviewed.   Constitutional:       Appearance: Normal appearance.   HENT:      Head: Normocephalic and atraumatic.      Right Ear: Tympanic membrane and ear canal normal.      Left Ear: Tympanic membrane and ear canal normal.      Nose: Nose normal.      Mouth/Throat:      Mouth: Mucous membranes are moist.   Neck:      Musculoskeletal: Normal range of motion.   Cardiovascular:      Rate and Rhythm: Normal rate and regular rhythm.      Pulses: Normal pulses.   Pulmonary:      Effort: Pulmonary effort is normal.      Breath sounds: Normal breath sounds.   Skin:     General: Skin is warm.      Capillary Refill: Capillary refill takes less than 2 seconds.      Findings: No rash.   Neurological:      General: No focal deficit present.      Mental Status: He is alert.   Psychiatric:         Mood and Affect: Mood normal.     Diagnostic Test Results:  None    ASSESSMENT/PLAN:     1. Loud snoring  Normal ESS, however with reported headaches and daytime fatigue.  Elevated BMI and normal BP.  Discussed position changes, and addition of another pillow - which he has tried and has ended up on the ground.  Consideration for sleep study; and evaluate for possible ANIBAL.  Discussed if normal - may review mouth guard, nasal strips, etc.  - SLEEP EVALUATION & MANAGEMENT REFERRAL - ADULT -[facility] [phone] (Age 13 and up); Future    2. Excessive  daytime sleepiness  Reported; although ESS score is 7-8 (depending on how he answers one question).  - SLEEP EVALUATION & MANAGEMENT REFERRAL - ADULT -[facility] [place] [phone] (Age 13 and up); Future    3. Pre-procedure lab exam  Will order covid testing to be completed 72 hours prior to sleep study.  - Asymptomatic COVID-19 Virus (Coronavirus) by PCR; Future    4. Gastroesophageal reflux disease, esophagitis presence not specified  Chronic, stable.  Refilled Pepcid at current dose.  - famotidine (PEPCID) 20 MG tablet; TAKE 1 TABLET BY MOUTH ONCE DAILY IF NEEDED  Dispense: 90 tablet; Refill: 4    5. Erectile dysfunction, unspecified erectile dysfunction type  Ineffective at 20-40mg dose; but will attempt increase to 60-80mg doses.  Reviewed 100mg per dose would be maximum.  New Rx sent to pharmacy.  - sildenafil (REVATIO) 20 MG tablet; Take 3-4 tablets (60-80 mg) by mouth once as needed (ED)  Dispense: 30 tablet; Refill: 2      Yomaira Faulkner DO  [facility]

## 2020-09-22 ENCOUNTER — OFFICE VISIT (OUTPATIENT)
Dept: FAMILY MEDICINE | Facility: OTHER | Age: 25
End: 2020-09-22
Attending: FAMILY MEDICINE
Payer: COMMERCIAL

## 2020-09-22 VITALS
TEMPERATURE: 97.5 F | OXYGEN SATURATION: 96 % | HEIGHT: 65 IN | DIASTOLIC BLOOD PRESSURE: 70 MMHG | BODY MASS INDEX: 31.32 KG/M2 | HEART RATE: 78 BPM | RESPIRATION RATE: 20 BRPM | WEIGHT: 188 LBS | SYSTOLIC BLOOD PRESSURE: 122 MMHG

## 2020-09-22 DIAGNOSIS — Z20.822 EXPOSURE TO COVID-19 VIRUS: Primary | ICD-10-CM

## 2020-09-22 PROCEDURE — U0003 INFECTIOUS AGENT DETECTION BY NUCLEIC ACID (DNA OR RNA); SEVERE ACUTE RESPIRATORY SYNDROME CORONAVIRUS 2 (SARS-COV-2) (CORONAVIRUS DISEASE [COVID-19]), AMPLIFIED PROBE TECHNIQUE, MAKING USE OF HIGH THROUGHPUT TECHNOLOGIES AS DESCRIBED BY CMS-2020-01-R: HCPCS | Mod: ZL | Performed by: FAMILY MEDICINE

## 2020-09-22 PROCEDURE — G0463 HOSPITAL OUTPT CLINIC VISIT: HCPCS

## 2020-09-22 PROCEDURE — 99213 OFFICE O/P EST LOW 20 MIN: CPT | Performed by: FAMILY MEDICINE

## 2020-09-22 PROCEDURE — C9803 HOPD COVID-19 SPEC COLLECT: HCPCS

## 2020-09-22 ASSESSMENT — MIFFLIN-ST. JEOR: SCORE: 1759.64

## 2020-09-22 ASSESSMENT — PAIN SCALES - GENERAL: PAINLEVEL: SEVERE PAIN (6)

## 2020-09-22 NOTE — LETTER
September 22, 2020      Phi Duncan  [address] [address]        To Whom It May Concern,     Phi had Covid testing today; please excuse from work until negative testing returns or per company policy.        Sincerely,          Yomaira Faulkner, DO  Family Practice

## 2020-09-22 NOTE — PATIENT INSTRUCTIONS
"Patient Education   After Your COVID-19 (Coronavirus) Test  You have been tested for COVID-19 (coronavirus).   Results are usually available within 4 days. Our testing sites do not have access to your test results.     If your test result is positive, you'll get a phone call letting you know. (A positive test means that you have the virus.)    If your test result is negative, you'll get a letter in the mail. (A negative test suggests you do not have the virus.)    You may also receive your results in [employer]. If you have questions after getting your results, please visit our testing website at [email or web address].  After 7 to 10 days, if you have not gotten your results:     Call [phone] ([phone]) and ask to speak with our COVID-19 results team.    If you're being treated at an infusion center: Call your infusion center directly.  What are the symptoms of COVID-19?  Symptoms may include any of the following: Fever, cough, trouble breathing, headache, body aches, sore throat, runny or stuffy nose, fatigue (feeling very tired), diarrhea (loose poop), and nausea or vomiting (feeling sick to the stomach or throwing up).  You may already have symptoms of COVID-19, or they may show up later.  What should I do if I have symptoms?  If you're having surgery: Call your surgeon's office.  For all other patients: Stay home and away from others (self-isolate) until ...    You've had no fever--and no medicine that reduces fever--for 1 full day (24 hours), AND    Other symptoms have gotten better. For example, your cough or breathing has improved, AND    At least 10 days have passed since your symptoms first started.  During this time    Stay in your own room, even for meals. Use your own bathroom if you can.    Stay away from others in your home. No hugging, kissing or shaking hands. No visitors.    Don't go to work, school or anywhere else.    Clean \"high touch\" surfaces often (doorknobs, " counters, handles). Use household cleaning spray or wipes. You'll find a full list of  on the EPA website: www.epa.gov/pesticide-registration/list-n-disinfectants-use-against-sars-cov-2.    Cover your mouth and nose with a mask or other face covering to avoid spreading germs.    Wash your hands and face often. Use soap and water.    Caregivers in these groups are at risk for severe illness due to COVID-19:  ? People 65 years and older  ? People who live in a nursing home or long-term care facility  ? People with chronic disease (lung, heart, cancer, diabetes, kidney, liver, immunologic)  ? People who have a weakened immune system, including those who:    Are in cancer treatment    Take medicine that weakens the immune system, such as corticosteroids    Had a bone marrow or organ transplant    Have an immune deficiency    Have poorly controlled HIV or AIDS    Are obese (body mass index of 40 or higher)    Smoke regularly    Caregivers should wear gloves while washing dishes, handling laundry and cleaning bedrooms and bathrooms.    Use caution when washing and drying laundry: Don't shake dirty laundry and use the warmest water setting that you can.    For more tips on managing your health at home, go to www.cdc.gov/coronavirus/2019-ncov/downloads/10Things.pdf.  How can I take care of myself at home?  1. Get lots of rest. Drink extra fluids (unless a doctor has told you not to).  2. Take Tylenol (acetaminophen) for fever or pain. If you have liver or kidney problems, ask your family doctor if it's okay to take Tylenol.     Adults can take either:  ? 650 mg (two 325 mg pills) every 4 to 6 hours, or   ? 1,000 mg (two 500 mg pills) every 8 hours as needed.  ? Note: Don't take more than 3,000 mg in one day. Acetaminophen is found in many medicines (both prescribed and over-the-counter medicines). Read all labels to be sure you don't take too much.   For children, check the Tylenol bottle for the right dose. The  dose is based on the child's age or weight.  3. If you have other health problems (like cancer, heart failure, an organ transplant or severe kidney disease): Call your specialty clinic if you don't feel better in the next 2 days.  4. Know when to call 911. Emergency warning signs include:  ? Trouble breathing or shortness of breath  ? Chest pain or pressure that doesn't go away  ? Feeling confused like you haven't felt before, or not being able to wake up  ? Bluish-colored lips or face  5. If your doctor prescribed a blood thinner medicine: Follow their instructions.  Where can I get more information?    [facility] - About COVID-19:   [email or web address]    CDC - If You're Sick: cdc.gov/coronavirus/2019-ncov/about/steps-when-sick.html    CDC - Ending Home Isolation: www.cdc.gov/coronavirus/2019-ncov/hcp/disposition-in-home-patients.html    CDC - Caring for Someone: www.cdc.gov/coronavirus/2019-ncov/if-you-are-sick/care-for-someone.html    [facility] - Interim Guidance for Hospital Discharge to Home: www.health.[facility].[place]/diseases/coronavirus/hcp/hospdischarge.pdf    [facility] clinical trials (COVID-19 research studies): [email or web address].[email or web address]-clinical-trials    Below are the COVID-19 hotlines at the [place] Department of Health ([facility]). Interpreters are available.  ? For health questions: Call 284-443-8806 or 1-273.539.6182 (7 a.m. to 7 p.m.)  ? For questions about schools and childcare: Call 749-086-6581 or 1-420.347.6960 (7 a.m. to 7 p.m.)    For informational purposes only. Not to replace the advice of your health care provider. Clinically reviewed by Infection Prevention and the [facility] COVID-19 Clinical Team. Copyright   2020 [employer]. All rights reserved. [employer] 549264 - Rev 8/4/20.

## 2020-09-22 NOTE — PROGRESS NOTES
SUBJECTIVE:   Phi Duncan is a 24 year old male who presents to clinic today for the following health issues:    HPI  Phi is here with his son for a well child visit; and then mentions he needs testing for Covid as requested by work.  There was a positive person at his employer and they are requesting everyone working that day get tested.  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  Denies any Covid symptoms: SOB, cough, body aches, fever/chills, N/V, loss of taste/smell.    Patient Active Problem List    Diagnosis Date Noted     Closed fracture of phalanx of left second toe with routine healing, subsequent encounter 06/11/2020     Priority: Medium     Added automatically from request for surgery [id number]       Fetal alcohol syndrome 05/16/2019     Priority: Medium     Abdominal pain, left upper quadrant 12/08/2018     Priority: Medium     ADHD 02/01/2018     Priority: Medium     Overview:   also FAES (per patient)       Gastroesophageal reflux disease 08/23/2017     Priority: Medium     Other nonallopathic lesion of cervical region 08/17/2012     Priority: Medium     Nonallopathic lesion of thoracic region 08/17/2012     Priority: Medium     Constipation 07/14/2011     Priority: Medium     Viral warts 04/29/2011     Priority: Medium     Past Medical History:   Diagnosis Date     Attention-deficit hyperactivity disorder     No Comments Provided     Major depressive disorder, single episode     No Comments Provided     Personal history of other medical treatment (CODE)     No Comments Provided     Sleep deprivation     No Comments Provided      Past Surgical History:   Procedure Laterality Date     EXTRACTION(S) DENTAL N/A     x8; then braces placed     EXTRACTION(S) DENTAL      wisdom teeth     LACERATION REPAIR N/A 2015    to upper lip; done in OR     OPEN REDUCTION INTERNAL FIXATION TOE(S) Left 6/18/2020    Procedure: left 2nd toe  ORIF, plantar plate repair, and excision of fracture fragment;  Surgeon: Avinash Singer DPM;  Location: [facility] OR     Family History   Problem Relation Age of Onset     Diabetes Mother      Cancer Mother         Cancer,Unsure type- in pelvis     Alcoholism Father      No Known Problems Sister      Social History     Tobacco Use     Smoking status: Current Every Day Smoker     Packs/day: 0.10     Years: 8.00     Pack years: 0.80     Types: Cigarettes     Smokeless tobacco: Never Used     Tobacco comment: 4-5 a day-Quit smoking: trying to quit   Substance Use Topics     Alcohol use: Yes     Alcohol/week: 0.0 standard drinks     Frequency: 2-4 times a month     Drinks per session: 1 or 2     Comment: occasionally      Social History     Social History Narrative    Previously in residential care at [facility]. He reports he got there by stealing an Ipod.  Currently living in a foster home on [address].  He loves to fish.    Works at [employer]     Current Outpatient Medications   Medication Sig Dispense Refill     cyclobenzaprine (FLEXERIL) 10 MG tablet Take 1 tablet (10 mg) by mouth 2 times daily as needed for muscle spasms 180 tablet 4     famotidine (PEPCID) 20 MG tablet TAKE 1 TABLET BY MOUTH ONCE DAILY IF NEEDED 90 tablet 4     imiquimod (ALDARA) 5 % external cream Apply a small sized amount to warts or molluscum three times weekly at bedtime.   Wash off after 8 hours.   May use for up to 16 weeks. 12 packet 3     nicotine (NICODERM CQ) 14 MG/24HR 24 hr patch Place 1 patch onto the skin every 24 hours 30 patch 2     order for DME Surgical shoe and crutches 1 Device 0     sildenafil (REVATIO) 20 MG tablet Take 3-4 tablets (60-80 mg) by mouth once as needed (ED) 30 tablet 2     No Known Allergies    Review of Systems   HENT: Negative for sneezing, sore throat and trouble swallowing.    Cardiovascular: Negative for chest pain and palpitations.   Skin: Negative for rash.   Neurological: Negative for dizziness and  "syncope.     OBJECTIVE:     /70   Pulse 78   Temp 97.5  F (36.4  C) (Tympanic)   Resp 20   Ht 1.651 m (5' 5\")   Wt 85.3 kg (188 lb)   SpO2 96%   BMI 31.28 kg/m    Body mass index is 31.28 kg/m .  Physical Exam  Vitals signs and nursing note reviewed.   Constitutional:       Appearance: Normal appearance.   HENT:      Head: Normocephalic and atraumatic.   Neck:      Musculoskeletal: Normal range of motion.   Cardiovascular:      Rate and Rhythm: Normal rate.   Pulmonary:      Effort: Pulmonary effort is normal.      Breath sounds: Normal breath sounds.   Skin:     Capillary Refill: Capillary refill takes less than 2 seconds.   Neurological:      General: No focal deficit present.      Mental Status: He is alert.   Psychiatric:         Mood and Affect: Mood normal.     Diagnostic Test Results:  None; covid swab completed today.    ASSESSMENT/PLAN:     1. Exposure to COVID-19 virus  At work >5 days ago; testing completed.  Note provided for no work until negative testing returns, 14 days or company policy.  Discussed symptoms develop up to 14 days after exposure, continue to monitor.  Supportive cares and 10-day quarantine if symptoms develop regardless if he is retested.    - Asymptomatic COVID-19 Virus (Coronavirus) by PCR      Yomaira Faulkner, [facility]  "

## 2020-09-22 NOTE — NURSING NOTE
"Chief Complaint   Patient presents with     Covid 19 Testing     exposure       Initial /70   Pulse 78   Temp 97.5  F (36.4  C) (Tympanic)   Resp 20   Ht 1.651 m (5' 5\")   Wt 85.3 kg (188 lb)   SpO2 96%   BMI 31.28 kg/m   Estimated body mass index is 31.28 kg/m  as calculated from the following:    Height as of this encounter: 1.651 m (5' 5\").    Weight as of this encounter: 85.3 kg (188 lb).  Medication Reconciliation: complete    Shaye Yoon LPN  "

## 2020-09-23 ASSESSMENT — ENCOUNTER SYMPTOMS
SORE THROAT: 0
TROUBLE SWALLOWING: 0
DIZZINESS: 0
PALPITATIONS: 0

## 2020-09-24 LAB
SARS-COV-2 RNA SPEC QL NAA+PROBE: NOT DETECTED
SPECIMEN SOURCE: NORMAL

## 2020-10-15 ENCOUNTER — ALLIED HEALTH/NURSE VISIT (OUTPATIENT)
Dept: FAMILY MEDICINE | Facility: OTHER | Age: 25
End: 2020-10-15
Attending: FAMILY MEDICINE
Payer: COMMERCIAL

## 2020-10-15 DIAGNOSIS — Z01.812 PRE-PROCEDURE LAB EXAM: ICD-10-CM

## 2020-10-15 PROCEDURE — 99207 PR NO CHARGE NURSE ONLY: CPT

## 2020-10-15 PROCEDURE — U0003 INFECTIOUS AGENT DETECTION BY NUCLEIC ACID (DNA OR RNA); SEVERE ACUTE RESPIRATORY SYNDROME CORONAVIRUS 2 (SARS-COV-2) (CORONAVIRUS DISEASE [COVID-19]), AMPLIFIED PROBE TECHNIQUE, MAKING USE OF HIGH THROUGHPUT TECHNOLOGIES AS DESCRIBED BY CMS-2020-01-R: HCPCS | Mod: ZL | Performed by: FAMILY MEDICINE

## 2020-10-15 PROCEDURE — C9803 HOPD COVID-19 SPEC COLLECT: HCPCS

## 2020-10-15 NOTE — PROGRESS NOTES
Patient swabbed for COVID-19 testing for sleep study   Vinh Rosen LPN on 10/15/2020 at 11:21 AM

## 2020-10-16 LAB
SARS-COV-2 RNA SPEC QL NAA+PROBE: NOT DETECTED
SPECIMEN SOURCE: NORMAL

## 2020-10-19 ENCOUNTER — THERAPY VISIT (OUTPATIENT)
Dept: SLEEP MEDICINE | Facility: OTHER | Age: 25
End: 2020-10-19
Attending: FAMILY MEDICINE
Payer: COMMERCIAL

## 2020-10-19 DIAGNOSIS — G47.19 EXCESSIVE DAYTIME SLEEPINESS: Primary | ICD-10-CM

## 2020-10-19 DIAGNOSIS — R06.83 HABITUAL SNORING: ICD-10-CM

## 2020-10-19 PROCEDURE — 95810 POLYSOM 6/> YRS 4/> PARAM: CPT | Performed by: INTERNAL MEDICINE

## 2020-10-28 ENCOUNTER — TELEPHONE (OUTPATIENT)
Dept: FAMILY MEDICINE | Facility: OTHER | Age: 25
End: 2020-10-28

## 2020-10-28 NOTE — TELEPHONE ENCOUNTER
"After calling patient to give him his sleep study result, he had questions regarding his snoring and what he can do to \"quiet\" his snoring.  He would like a direct call from Dr Faulkner when she has time.  Shaye Yoon, [facility], LPN  10/28/2020  2:40 PM             "

## 2020-10-28 NOTE — TELEPHONE ENCOUNTER
Called and discussed further tips - regular exercise, (failed to mention smoking cessation; but has worked on quitting in recent past), elevate head of bed, breathe-right strips which he claims do not stay on his nose.    Insurance not likely to cover other oral device or CPAP trial.  Will continue to monitor as ongoing lifestyle will predispose him to CPAP in future.  Consider re-testing if worsens.    Yomaira Faulkner, DO

## 2020-12-09 ENCOUNTER — HOSPITAL ENCOUNTER (EMERGENCY)
Facility: OTHER | Age: 25
Discharge: HOME | End: 2020-12-09
Attending: STUDENT IN AN ORGANIZED HEALTH CARE EDUCATION/TRAINING PROGRAM | Admitting: STUDENT IN AN ORGANIZED HEALTH CARE EDUCATION/TRAINING PROGRAM
Payer: COMMERCIAL

## 2020-12-09 VITALS
OXYGEN SATURATION: 94 % | DIASTOLIC BLOOD PRESSURE: 81 MMHG | BODY MASS INDEX: 31.28 KG/M2 | WEIGHT: 188 LBS | SYSTOLIC BLOOD PRESSURE: 134 MMHG | HEART RATE: 83 BPM | RESPIRATION RATE: 18 BRPM | TEMPERATURE: 97.4 F

## 2020-12-09 DIAGNOSIS — S91.311A FOOT LACERATION, RIGHT, INITIAL ENCOUNTER: ICD-10-CM

## 2020-12-09 PROCEDURE — 12002 RPR S/N/AX/GEN/TRNK2.6-7.5CM: CPT | Performed by: STUDENT IN AN ORGANIZED HEALTH CARE EDUCATION/TRAINING PROGRAM

## 2020-12-09 PROCEDURE — 99282 EMERGENCY DEPT VISIT SF MDM: CPT | Mod: 25 | Performed by: STUDENT IN AN ORGANIZED HEALTH CARE EDUCATION/TRAINING PROGRAM

## 2020-12-09 PROCEDURE — 99283 EMERGENCY DEPT VISIT LOW MDM: CPT | Mod: 25 | Performed by: STUDENT IN AN ORGANIZED HEALTH CARE EDUCATION/TRAINING PROGRAM

## 2020-12-09 NOTE — ED AVS SNAPSHOT
[facility]  [address]  [place] [address]  Phone: [phone]  Fax: [phone]                                    Phi Duncan   MRN: [id number]    Department: [facility] [place] [facility]   Date of Visit: 12/9/2020           After Visit Summary Signature Page    I have received my discharge instructions, and my questions have been answered. I have discussed any challenges I see with this plan with the nurse or doctor.    ..........................................................................................................................................  Patient/Patient Representative Signature      ..........................................................................................................................................  Patient Representative Print Name and Relationship to Patient    ..................................................               ................................................  Date                                   Time    ..........................................................................................................................................  Reviewed by Signature/Title    ...................................................              ..............................................  Date                                               Time          22EPIC Rev 08/18

## 2020-12-10 ENCOUNTER — TELEPHONE (OUTPATIENT)
Dept: FAMILY MEDICINE | Facility: OTHER | Age: 25
End: 2020-12-10

## 2020-12-10 NOTE — ED PROVIDER NOTES
Emergency Department Provider Note  : 1995 Age: 24 year old Sex: male MRN: [id number]    Chief Complaint   Patient presents with     Laceration     right foot       Medical Decision Making / Assessment / Plan   24 year old male presenting with 3 cm laceration to the medial aspect of his right foot.  Patient was wearing heavy-duty boots and it was a clean blade of the auger, he is not concerned about any foreign bodies.  His tetanus was updated in 2016.  His is CMS intact and bleeding was well controlled and at virtually stopped by the time I evaluated him.  I offered either sutures or glue and we used shared decision-making to opted for glue.  We discussed wound care including staring clear from soaking for the next 24 to 36 hours and signs and symptoms of an infection including systemic symptoms.  He verbalized understanding, states he has had multiple lacerations before and knew how to treat them well.  Please see procedure note for further details, however surgically was used to close the well approximated linear laceration along the medial aspect of his foot.    The patient was informed of the plan and verbalized understanding and agreed with the plan. The patient was given strict return to Emergency Department precautions as well as appropriate follow up instructions. The patient was discharged in stable condition.    New Prescriptions    No medications on file       Final diagnoses:   Foot laceration, right, initial encounter       Alesia Noel MD  2020   [facility] Emergency Department    Miryam Yip is a 24 year old male who presents at  8:16 PM with laceration to the right foot.  Patient was wearing heavy-duty winter boots and was ice fishing when a clean blade of an auger was in his when he stepped on it.  It went through the boot and nicked his skin.  He was unclear how deep the wound was as it was bleeding quite profusely and he placed a diaper on it to control the  bleeding.  He denies any numbness/tingling/weakness.  Unknown last tetanus.    I have reviewed the Medications, Allergies, Past Medical and Surgical History, and Social History in the Epic System and with family.    Review of Systems:  Please see HPI for pertinent positives and negatives. All other systems reviewed and found to be negative.      Objective        Physical Exam:   General: Awake, alert, in no acute distress.  Joking with myself and the nurse  Head: Normocephalic, atraumatic.  Eyes: Conjugate gaze.  ENT: Moist membranes, external ear appears normal.   Chest/Respiratory: Equal chest rise.  No respiratory distress  Cardiovascular: Peripheral pulses present.  Abdominal: Soft, non-distended, non-tender.  Extremities: No obvious deformity.  Neurological: GCS 15, moving all extremities without gross deficit.  Skin: 3 cm linear laceration along the medial aspect of the right foot just superior to the ball of the foot.  Nontender.  Less than 2-second cap refill.  Sensation intact.  5 out of 5 strength bilateral lower extremities.  Psychiatric: Appropriate affect.      Procedures / Critical Care   [facility]    -Laceration Repair    Date/Time: 12/9/2020 8:31 PM  Performed by: Alesia Noel MD  Authorized by: Alesia Noel MD       ANESTHESIA (see MAR for exact dosages):     Anesthesia method:  None  LACERATION DETAILS     Location:  Foot    Length (cm):  3    REPAIR TYPE:     Repair type:  Simple      EXPLORATION:     Hemostasis achieved with:  Direct pressure    TREATMENT:     Irrigation solution:  Sterile saline    Irrigation volume:  500    Irrigation method:  Syringe    Visualized foreign bodies/material removed: no      SKIN REPAIR     Repair method:  Tissue adhesive    APPROXIMATION     Approximation:  Close    POST-PROCEDURE DETAILS     Dressing:  Sterile dressing          Critical Care Time: None.          Medical/Surgical History:  Past Medical History:   Diagnosis Date      Attention-deficit hyperactivity disorder     No Comments Provided     Major depressive disorder, single episode     No Comments Provided     Personal history of other medical treatment (CODE)     No Comments Provided     Sleep deprivation     No Comments Provided     Past Surgical History:   Procedure Laterality Date     EXTRACTION(S) DENTAL N/A     x8; then braces placed     EXTRACTION(S) DENTAL      wisdom teeth     LACERATION REPAIR N/A 2015    to upper lip; done in OR     OPEN REDUCTION INTERNAL FIXATION TOE(S) Left 6/18/2020    Procedure: left 2nd toe ORIF, plantar plate repair, and excision of fracture fragment;  Surgeon: Avinash Singer DPM;  Location:  OR       Medications:  No current facility-administered medications for this encounter.      Current Outpatient Medications   Medication     cyclobenzaprine (FLEXERIL) 10 MG tablet     famotidine (PEPCID) 20 MG tablet     imiquimod (ALDARA) 5 % external cream     nicotine (NICODERM CQ) 14 MG/24HR 24 hr patch     order for DME     sildenafil (REVATIO) 20 MG tablet       Allergies:  Patient has no known allergies.    Relevant labs, images, EKGs, Epic and outside hospital (if applicable) charts were reviewed. The findings, diagnosis, plan, and need for follow up were discussed with the patient/family. Nursing notes were reviewed.     MD Bert Frazier Sarah, MD  12/09/20 2032

## 2020-12-10 NOTE — TELEPHONE ENCOUNTER
Patient went into the ED last night and they glued his right foot shut and it came open today but it isn't bleeding. Patient is wondering if he will need to go back in and have them re-glue it or if he will be fine?

## 2020-12-10 NOTE — TELEPHONE ENCOUNTER
Relayed information from provider to patient. Patient verbalized understanding and agreeable. Transferred patient to scheduling to make an appointment. Lori Rios RN  ....................  12/10/2020   2:53 PM

## 2020-12-10 NOTE — ED TRIAGE NOTES
Pt presents to ED ambulatory with a Chief complaint of a laceration to left foot. Pt was testing out his new Ice auger and Ice auger cut through his boot. Clean cut laceration  About 1 inch long on inner right foot by big toe.

## 2020-12-10 NOTE — TELEPHONE ENCOUNTER
Hi, I reviewed the ER note.  Since it was a superficial straight laceration closed with glue, he does not need to come in to have it reglued.  He should keep an eye on it and monitor for any signs of infection.  He should let it air dry, or if he needs to put on socks and shoes, then he should cover it with triple antibiotic ointment and a bandage.  Please have him follow-up in clinic in 1 week with PCP.  -CCN

## 2020-12-18 ENCOUNTER — OFFICE VISIT (OUTPATIENT)
Dept: FAMILY MEDICINE | Facility: OTHER | Age: 25
End: 2020-12-18
Attending: PHYSICIAN ASSISTANT
Payer: COMMERCIAL

## 2020-12-18 VITALS
BODY MASS INDEX: 32.98 KG/M2 | DIASTOLIC BLOOD PRESSURE: 60 MMHG | SYSTOLIC BLOOD PRESSURE: 100 MMHG | HEART RATE: 72 BPM | RESPIRATION RATE: 16 BRPM | WEIGHT: 198.2 LBS | TEMPERATURE: 97.8 F

## 2020-12-18 DIAGNOSIS — S91.311D FOOT LACERATION, RIGHT, SUBSEQUENT ENCOUNTER: ICD-10-CM

## 2020-12-18 DIAGNOSIS — L30.9 DERMATITIS: Primary | ICD-10-CM

## 2020-12-18 PROCEDURE — 99213 OFFICE O/P EST LOW 20 MIN: CPT | Performed by: PHYSICIAN ASSISTANT

## 2020-12-18 PROCEDURE — G0463 HOSPITAL OUTPT CLINIC VISIT: HCPCS

## 2020-12-18 RX ORDER — LORATADINE 10 MG/1
10 TABLET ORAL DAILY
Qty: 30 TABLET | Refills: 2 | Status: SHIPPED | OUTPATIENT
Start: 2020-12-18 | End: 2022-02-22

## 2020-12-18 NOTE — PATIENT INSTRUCTIONS
Encouraged use of benadryl 25-50 mg up to 4 times daily (or at bedtime) and claritin 10 mg daily. Return to clinic with change/worsening of symptoms. Return in 1-2 weeks if symptoms persist or are not resolving.

## 2020-12-18 NOTE — PROGRESS NOTES
Nursing Notes:   Sandrine Villeda LPN  12/18/2020  3:33 PM  Signed  Chief Complaint   Patient presents with     Derm Problem     itchy legs         Medication Reconciliation: complete    Sandrine Villeda LPN        HPI:    Phi Duncan is a 24 year old male who presents for itchy legs.  He states that over the last few years he has had itchy legs during the wintertime.  They itch especially at nighttime when he goes to bed.  He will wake up itching.  Make them worse.  His whole legs itch.  Notices a few white papules when he has itching but no erythematous papules or open wounds.  No hives.  He has tried switching soaps and laundry detergents however this has not helped.    He states that he accidentally cut his foot with an auger on 12/9.  Presented to the emergency room.  They glued the laceration closed.  He states that this broke open.  No surrounding erythema, pus, drainage, warmth, fevers, chills.      Past Medical History:   Diagnosis Date     Attention-deficit hyperactivity disorder     No Comments Provided     Major depressive disorder, single episode     No Comments Provided     Personal history of other medical treatment (CODE)     No Comments Provided     Sleep deprivation     No Comments Provided       Past Surgical History:   Procedure Laterality Date     EXTRACTION(S) DENTAL N/A     x8; then braces placed     EXTRACTION(S) DENTAL      wisdom teeth     LACERATION REPAIR N/A 2015    to upper lip; done in OR     OPEN REDUCTION INTERNAL FIXATION TOE(S) Left 6/18/2020    Procedure: left 2nd toe ORIF, plantar plate repair, and excision of fracture fragment;  Surgeon: Avinash Singer DPM;  Location:  OR       Family History   Problem Relation Age of Onset     Diabetes Mother      Cancer Mother         Cancer,Unsure type- in pelvis     Alcoholism Father      No Known Problems Sister        Social History     Tobacco Use     Smoking status: Current Every Day Smoker     Packs/day: 0.10      Years: 8.00     Pack years: 0.80     Types: Cigarettes     Smokeless tobacco: Never Used     Tobacco comment: 6 a day-Quit smoking: trying to quit   Substance Use Topics     Alcohol use: Yes     Alcohol/week: 0.0 standard drinks     Frequency: 2-4 times a month     Drinks per session: 1 or 2     Comment: occasionally        Current Outpatient Medications   Medication Sig Dispense Refill     cyclobenzaprine (FLEXERIL) 10 MG tablet Take 1 tablet (10 mg) by mouth 2 times daily as needed for muscle spasms 180 tablet 4     famotidine (PEPCID) 20 MG tablet TAKE 1 TABLET BY MOUTH ONCE DAILY IF NEEDED 90 tablet 4     imiquimod (ALDARA) 5 % external cream Apply a small sized amount to warts or molluscum three times weekly at bedtime.   Wash off after 8 hours.   May use for up to 16 weeks. 12 packet 3     loratadine (CLARITIN) 10 MG tablet Take 1 tablet (10 mg) by mouth daily 30 tablet 2     nicotine (NICODERM CQ) 14 MG/24HR 24 hr patch Place 1 patch onto the skin every 24 hours 30 patch 2     order for DME Surgical shoe and crutches 1 Device 0     sildenafil (REVATIO) 20 MG tablet Take 3-4 tablets (60-80 mg) by mouth once as needed (ED) 30 tablet 2       No Known Allergies    REVIEW OFSYSTEMS:  Refer to HPI.    EXAM:   Vitals:    /60 (BP Location: Right arm, Patient Position: Sitting, Cuff Size: Adult Large)   Pulse 72   Temp 97.8  F (36.6  C)   Resp 16   Wt 89.9 kg (198 lb 3.2 oz)   BMI 32.98 kg/m    General Appearance: Pleasant, alert, appropriate appearance for age. No acute distress  Foot Exam: Left and right foot: Normal capillary refill, no lesions, nail hygiene good.  Skin: Open laceration appreciated in right medial proximal first toe approximately 1 cm in length and 2 mm in diameter.  No surrounding erythema, pus, drainage, warmth.  No other rash or skin irritation appreciated on the lower extremities.  Psychiatric Exam: Alert and oriented - appropriate affect.    PHQ Depression Screen  PHQ-9 SCORE  12/7/2018 6/6/2019 6/4/2020   PHQ-9 Total Score 0 7 7       ASSESSMENT AND PLAN:      ICD-10-CM    1. Dermatitis  L30.9 ALLERGY/ASTHMA ADULT REFERRAL     loratadine (CLARITIN) 10 MG tablet   2. Foot laceration, right, subsequent encounter  S91.311D      Dermatitis:  Encouraged use of benadryl 25-50 mg up to 4 times daily (or at bedtime) and claritin 10 mg daily. Return to clinic with change/worsening of symptoms. Return in 1-2 weeks if symptoms persist or are not resolving.  Referred for allergy testing.    Foot laceration: Patient's laceration is stable.  No infection concerns are appreciated.  Gave infection warning signs and symptoms.  Encouraged to keep the area clean and dry.  Can use butterfly bandages if preferred to keep the laceration well approximated.     Patient Instructions   Encouraged use of benadryl 25-50 mg up to 4 times daily (or at bedtime) and claritin 10 mg daily. Return to clinic with change/worsening of symptoms. Return in 1-2 weeks if symptoms persist or are not resolving.         Nery Arroyo PA-C PA-C..................12/18/2020 3:33 PM

## 2020-12-18 NOTE — NURSING NOTE
Chief Complaint   Patient presents with     Derm Problem     itchy legs         Medication Reconciliation: complete    Sandrine Villeda, LPN

## 2021-12-15 ENCOUNTER — OFFICE VISIT (OUTPATIENT)
Dept: FAMILY MEDICINE | Facility: OTHER | Age: 26
End: 2021-12-15
Attending: NURSE PRACTITIONER
Payer: MEDICARE

## 2021-12-15 VITALS
DIASTOLIC BLOOD PRESSURE: 80 MMHG | WEIGHT: 186.6 LBS | SYSTOLIC BLOOD PRESSURE: 126 MMHG | BODY MASS INDEX: 31.05 KG/M2 | OXYGEN SATURATION: 96 % | RESPIRATION RATE: 18 BRPM | HEART RATE: 73 BPM | TEMPERATURE: 98.2 F

## 2021-12-15 DIAGNOSIS — J34.89 SINUS PRESSURE: ICD-10-CM

## 2021-12-15 DIAGNOSIS — R09.81 NASAL CONGESTION: Primary | ICD-10-CM

## 2021-12-15 PROCEDURE — G0463 HOSPITAL OUTPT CLINIC VISIT: HCPCS

## 2021-12-15 PROCEDURE — 99213 OFFICE O/P EST LOW 20 MIN: CPT | Performed by: NURSE PRACTITIONER

## 2021-12-15 PROCEDURE — C9803 HOPD COVID-19 SPEC COLLECT: HCPCS | Performed by: NURSE PRACTITIONER

## 2021-12-15 PROCEDURE — U0003 INFECTIOUS AGENT DETECTION BY NUCLEIC ACID (DNA OR RNA); SEVERE ACUTE RESPIRATORY SYNDROME CORONAVIRUS 2 (SARS-COV-2) (CORONAVIRUS DISEASE [COVID-19]), AMPLIFIED PROBE TECHNIQUE, MAKING USE OF HIGH THROUGHPUT TECHNOLOGIES AS DESCRIBED BY CMS-2020-01-R: HCPCS | Mod: ZL | Performed by: NURSE PRACTITIONER

## 2021-12-15 ASSESSMENT — PAIN SCALES - GENERAL: PAINLEVEL: MODERATE PAIN (5)

## 2021-12-15 NOTE — PROGRESS NOTES
ASSESSMENT/PLAN:  1. Nasal congestion    - Symptomatic; Yes COVID-19 Virus (Coronavirus) by PCR Nose    2. Sinus pressure      COVID result is pending. Instructed the patient to remain in quarantine until he receives his COVID result as long as this is negative.     Discussed with patient that symptoms and exam are consistent with viral illness, consistent with viral sinusitis.  Discussed that symptomatic treatment is appropriate but not with antibiotics.        Symptomatic treatment - Encouraged fluids, salt water gargles, honey, elevation, humidifier, sinus rinse/netti pot, lozenges, etc     May use over-the-counter Tylenol or ibuprofen PRN    Discussed warning signs/symptoms indicative of need to f/u    Follow up if symptoms persist or worsen or concerns      I explained my diagnostic considerations and recommendations to the patient, who voiced understanding and agreement with the treatment plan. All questions were answered. We discussed potential side effects of any prescribed or recommended therapies, as well as expectations for response to treatments.        HPI:    Phi Duncan is a 25 year old male  who presents to [facility] today for pain and pressure to the left ear and sinus pressure that started 2 days ago. Reports frontal sinus pressure when he bends forward. Rating pain 4/10. Denies taking any OTC medication for the pain. Denies fevers. Denies any other upper respiratory symptoms. Symptoms started 2 days ago.     Past Medical History:   Diagnosis Date     Attention-deficit hyperactivity disorder     No Comments Provided     Major depressive disorder, single episode     No Comments Provided     Personal history of other medical treatment (CODE)     No Comments Provided     Sleep deprivation     No Comments Provided     Past Surgical History:   Procedure Laterality Date     EXTRACTION(S) DENTAL N/A     x8; then braces placed     EXTRACTION(S) DENTAL      wisdom teeth     LACERATION REPAIR N/A 2015     to upper lip; done in OR     OPEN REDUCTION INTERNAL FIXATION TOE(S) Left 2020    Procedure: left 2nd toe ORIF, plantar plate repair, and excision of fracture fragment;  Surgeon: Avinash Singer DPM;  Location:  OR     Social History     Tobacco Use     Smoking status: Former Smoker     Packs/day: 0.10     Years: 8.00     Pack years: 0.80     Types: Cigarettes     Quit date: 6/15/2021     Years since quittin.5     Smokeless tobacco: Never Used     Tobacco comment: 6 a day-Quit smoking: trying to quit   Substance Use Topics     Alcohol use: Yes     Alcohol/week: 0.0 standard drinks     Comment: occasionally      Current Outpatient Medications   Medication Sig Dispense Refill     cyclobenzaprine (FLEXERIL) 10 MG tablet Take 1 tablet (10 mg) by mouth 2 times daily as needed for muscle spasms (Patient not taking: Reported on 12/15/2021) 180 tablet 4     famotidine (PEPCID) 20 MG tablet TAKE 1 TABLET BY MOUTH ONCE DAILY IF NEEDED (Patient not taking: Reported on 12/15/2021) 90 tablet 4     imiquimod (ALDARA) 5 % external cream Apply a small sized amount to warts or molluscum three times weekly at bedtime.   Wash off after 8 hours.   May use for up to 16 weeks. (Patient not taking: Reported on 12/15/2021) 12 packet 3     loratadine (CLARITIN) 10 MG tablet Take 1 tablet (10 mg) by mouth daily (Patient not taking: Reported on 12/15/2021) 30 tablet 2     nicotine (NICODERM CQ) 14 MG/24HR 24 hr patch Place 1 patch onto the skin every 24 hours (Patient not taking: Reported on 12/15/2021) 30 patch 2     order for DME Surgical shoe and crutches (Patient not taking: Reported on 12/15/2021) 1 Device 0     sildenafil (REVATIO) 20 MG tablet Take 3-4 tablets (60-80 mg) by mouth once as needed (ED) (Patient not taking: Reported on 12/15/2021) 30 tablet 2     No Known Allergies      Past medical history, past surgical history, current medications and allergies reviewed and accurate to the best of my knowledge.         ROS:  Refer to HPI    /80   Pulse 73   Temp 98.2  F (36.8  C) (Tympanic)   Resp 18   Wt 84.6 kg (186 lb 9.6 oz)   SpO2 96%   BMI 31.05 kg/m      EXAM:  General Appearance: Well appearing male, appropriate appearance for age. No acute distress  Ears: Left TM intact, translucent with bony landmarks appreciated, no erythema, no effusion, no bulging, no purulence.  Right TM intact, translucent with bony landmarks appreciated, no erythema, no effusion, no bulging, no purulence.  Left auditory canal clear.  Right auditory canal clear.  Normal external ears, non tender.  Orophayrnx: moist mucous membranes, posterior pharynx without erythema, tonsils without hypertrophy, no erythema, no exudates or petechiae, no post nasal drip seen, no trismus, voice clear.    Sinuses:  Sinus tenderness upon palpation of the frontal and maxillary sinuses  Neck: supple without adenopathy  Respiratory: normal chest wall and respirations.  Normal effort.  Clear to auscultation bilaterally, no wheezing, crackles or rhonchi.  No increased work of breathing.  No cough appreciated.  Cardiac: RRR with no murmurs  Psychological: normal affect, alert, oriented, and pleasant.

## 2021-12-15 NOTE — LETTER
December 15, 2021        Phi Duncan  [address] [address]    To Whom it May Concern:    Phi Duncan was seen in our office on 12/15/2021 and was given the following instructions:  Patient may return to work once he receives his COVID result as long as this is negative and he is fever free for 24 hours without the use of fever reducing medication.    Sincerely,          Quyen Denis NP ..................12/15/2021 5:58 PM

## 2021-12-15 NOTE — NURSING NOTE
"Chief Complaint   Patient presents with     Ear Problem     Patient is here for pain and pressure in the left ear and sinus pressure that started two days ago.     Initial /80   Pulse 73   Temp 98.2  F (36.8  C) (Tympanic)   Resp 18   Wt 84.6 kg (186 lb 9.6 oz)   SpO2 96%   BMI 31.05 kg/m   Estimated body mass index is 31.05 kg/m  as calculated from the following:    Height as of 9/22/20: 1.651 m (5' 5\").    Weight as of this encounter: 84.6 kg (186 lb 9.6 oz).  Medication Reconciliation: complete    Angelica Wu LPN  "

## 2021-12-17 ENCOUNTER — TELEPHONE (OUTPATIENT)
Dept: EMERGENCY MEDICINE | Facility: CLINIC | Age: 26
End: 2021-12-17
Payer: COMMERCIAL

## 2021-12-17 LAB — SARS-COV-2 RNA RESP QL NAA+PROBE: POSITIVE

## 2021-12-17 NOTE — TELEPHONE ENCOUNTER
"-Coronavirus (COVID-19) Notification    Caller Name (Patient, parent, daughter/son, grandparent, etc)  Patient   Patient declined being educated stating I already know everything can you just skip this?  Reason for call  Notify of Positive Coronavirus (COVID-19) lab results, assess symptoms,  review [facility] recommendations    Lab Result    Lab test:  2019-nCoV rRt-PCR or SARS-CoV-2 PCR    Oropharyngeal AND/OR nasopharyngeal swabs is POSITIVE for 2019-nCoV RNA/SARS-COV-2 PCR (COVID-19 virus)    RN Recommendations/Instructions per [facility] Coronavirus COVID-19 recommendations    Brief introduction script  Introduce self then review script:  \"I am calling on behalf of [employer].  We were notified that your Coronavirus test (COVID-19) for was POSITIVE for the virus.  I have some information to relay to you but first I wanted to mention that the [place] Dept of Health will be contacting you shortly [it's possible MD already called Patient] to talk to you more about how you are feeling and other people you have had contact with who might now also have the virus.  Also,  [employer] is Partnering with the [facility] for Covid-19 research, you may be contacted directly by research staff.\"    Assessment (Inquire about Patient's current symptoms)   Assessment   Current Symptoms at time of phone call: (if no symptoms, document No symptoms] No smell and taste    Symptoms onset (if applicable) 12/15/2021     If at time of call, Patients symptoms hare worsened, the Patient should contact 911 or have someone drive them to Emergency Dept promptly:      If Patient calling 911, inform 911 personal that you have tested positive for the Coronavirus (COVID-19).  Place mask on and await 911 to arrive.    If Emergency Dept, If possible, please have another adult drive you to the Emergency Dept but you need to wear mask when in contact with other people.      Monoclonal Antibody Administration    You may be " "eligible to receive a new treatment with a monoclonal antibody for preventing hospitalization in patients at high risk for complications from COVID-19.   This medication is still experimental and available on a limited basis; it is given through an IV and must be given at an infusion center. Please note that not all people who are eligible will receive the medication since it is in limited supply.     Are you interested in being considered for this medication?  No.   Does the patient fit the criteria: No    If patient qualifies based on above criteria:  \"You will be contacted if you are selected to receive this treatment in the next 1-2 business days.   This is time sensitive and if you are not selected in the next 1-2 business days, you will not receive the medication.  If you do not receive a call to schedule, you have not been selected.\"      Review information with Patient    Your result was positive. This means you have COVID-19 (coronavirus).  We have sent you a letter that reviews the information that I'll be reviewing with you now.    How can I protect others?    If you have symptoms: stay home and away from others (self-isolate) until:    You've had no fever--and no medicine that reduces fever--for 1 full day (24 hours). And       Your other symptoms have gotten better. For example, your cough or breathing has improved. And     At least 10 days have passed since your symptoms started. (If you've been told by a doctor that you have a weak immune system, wait 20 days.)     If you don't have symptoms: Stay home and away from others (self-isolate) until at least 10 days have passed since your first positive COVID-19 test. (Date test collected)    During this time:    Stay in your own room, including for meals. Use your own bathroom if you can.    Stay away from others in your home. No hugging, kissing or shaking hands. No visitors.     Don't go to work, school or anywhere else.     Clean  high touch  surfaces " often (doorknobs, counters, handles, etc.). Use a household cleaning spray or wipes. You'll find a full list on the EPA website at www.epa.gov/pesticide-registration/list-n-disinfectants-use-against-sars-cov-2.     Cover your mouth and nose with a mask, tissue or other face covering to avoid spreading germs.    Wash your hands and face often with soap and water.    Make a list of people you have been in close contact with recently, even if either of you wore a face covering.   ; Start your list from 2 days before you became ill or had a positive test.  ; Include anyone that was within 6 feet of you for a cumulative total of 15 minutes or more in 24 hours. (Example: if you sat next to Marcus for 5 minutes in the morning and 10 minutes in the afternoon, then you were in close contact for 15 minutes total that day. Marcus would be added to your list.)    A public health worker will call or text you. It is important that you answer. They will ask you questions about possible exposures to COVID-19, such as people you have been in direct contact with and places you have visited.    Tell the people on your list that you have COVID-19; they should stay away from others for 14 days starting from the last time they were in contact with you (unless you are told something different from a public health worker).     Caregivers in these groups are at risk for severe illness due to COVID-19:  o People 65 years and older  o People who live in a nursing home or long-term care facility  o People with chronic disease (lung, heart, cancer, diabetes, kidney, liver, immunologic)  o People who have a weakened immune system, including those who:  - Are in cancer treatment  - Take medicine that weakens the immune system, such as corticosteroids  - Had a bone marrow or organ transplant  - Have an immune deficiency  - Have poorly controlled HIV or AIDS  - Are obese (body mass index of 40 or higher)  - Smoke regularly    Caregivers should wear  gloves while washing dishes, handling laundry and cleaning bedrooms and bathrooms.    Wash and dry laundry with special caution. Don't shake dirty laundry, and use the warmest water setting you can.    If you have a weakened immune system, ask your doctor about other actions you should take.    For more tips, go to www.cdc.gov/coronavirus/2019-ncov/downloads/10Things.pdf.    You should not go back to work until you meet the guidelines above for ending your home isolation. You don't need to be retested for COVID-19 before going back to work--studies show that you won't spread the virus if it's been at least 10 days since your symptoms started (or 20 days, if you have a weak immune system).    Employers: This document serves as formal notice of your employee's medical guidelines for going back to work. They must meet the above guidelines before going back to work in person.    How can I take care of myself?    1. Get lots of rest. Drink extra fluids (unless a doctor has told you not to).    2. Take Tylenol (acetaminophen) for fever or pain. If you have liver or kidney problems, ask your family doctor if it's okay to take Tylenol.     Take either:     650 mg (two 325 mg pills) every 4 to 6 hours, or     1,000 mg (two 500 mg pills) every 8 hours as needed.     Note: Don't take more than 3,000 mg in one day. Acetaminophen is found in many medicines (both prescribed and over-the-counter medicines). Read all labels to be sure you don't take too much.    For children, check the Tylenol bottle for the right dose (based on their age or weight).    3. If you have other health problems (like cancer, heart failure, an organ transplant or severe kidney disease): Call your specialty clinic if you don't feel better in the next 2 days.    4. Know when to call 911: Emergency warning signs include:    Trouble breathing or shortness of breath    Pain or pressure in the chest that doesn't go away    Feeling confused like you haven't  felt before, or not being able to wake up    Bluish-colored lips or face    5. Sign up for [employer]. We know it's scary to hear that you have COVID-19. We want to track your symptoms to make sure you're okay over the next 2 weeks. Please look for an email from [employer]--this is a free, online program that we'll use to keep in touch. To sign up, follow the link in the email. Learn more at [email or web address].    Where can I get more information?    [facility]: [email or web address]    Coronavirus Basics: www.health.[facility].[place]/diseases/coronavirus/basics.html    What to Do If You're Sick: www.cdc.gov/coronavirus/2019-ncov/about/steps-when-sick.html    Ending Home Isolation: www.cdc.gov/coronavirus/2019-ncov/hcp/disposition-in-home-patients.html     Caring for Someone with COVID-19: www.cdc.gov/coronavirus/2019-ncov/if-you-are-sick/care-for-someone.html     [facility] clinical trials (COVID-19 research studies): [email or web address].[facility]-clinical-trials     A Positive COVID-19 letter will be sent via [employer] or the mail. (Exception, no letters sent to Presurgerical/Preprocedure Patients)    Carmella Johnson LPN

## 2022-02-22 ENCOUNTER — OFFICE VISIT (OUTPATIENT)
Dept: FAMILY MEDICINE | Facility: OTHER | Age: 27
End: 2022-02-22
Attending: NURSE PRACTITIONER
Payer: MEDICARE

## 2022-02-22 VITALS
BODY MASS INDEX: 28.28 KG/M2 | DIASTOLIC BLOOD PRESSURE: 84 MMHG | RESPIRATION RATE: 16 BRPM | HEART RATE: 73 BPM | WEIGHT: 176 LBS | SYSTOLIC BLOOD PRESSURE: 114 MMHG | HEIGHT: 66 IN | OXYGEN SATURATION: 97 % | TEMPERATURE: 99.2 F

## 2022-02-22 DIAGNOSIS — R07.0 THROAT PAIN: Primary | ICD-10-CM

## 2022-02-22 LAB — GROUP A STREP BY PCR: NOT DETECTED

## 2022-02-22 PROCEDURE — 87651 STREP A DNA AMP PROBE: CPT | Mod: ZL | Performed by: STUDENT IN AN ORGANIZED HEALTH CARE EDUCATION/TRAINING PROGRAM

## 2022-02-22 PROCEDURE — 99213 OFFICE O/P EST LOW 20 MIN: CPT | Performed by: STUDENT IN AN ORGANIZED HEALTH CARE EDUCATION/TRAINING PROGRAM

## 2022-02-22 PROCEDURE — G0463 HOSPITAL OUTPT CLINIC VISIT: HCPCS

## 2022-02-22 ASSESSMENT — PAIN SCALES - GENERAL: PAINLEVEL: MODERATE PAIN (5)

## 2022-02-23 NOTE — NURSING NOTE
"Chief Complaint   Patient presents with     Throat Problem     right sided throat pain today   He has been having pain when he swallows in the right side of his throt.  Tami Lo LPN..................2/22/2022   7:14 PM    Initial /84   Pulse 73   Temp 99.2  F (37.3  C) (Temporal)   Resp 16   Ht 1.664 m (5' 5.5\")   Wt 79.8 kg (176 lb)   SpO2 97%   BMI 28.84 kg/m   Estimated body mass index is 28.84 kg/m  as calculated from the following:    Height as of this encounter: 1.664 m (5' 5.5\").    Weight as of this encounter: 79.8 kg (176 lb).  Medication Reconciliation: complete    FOOD SECURITY SCREENING QUESTIONS  Hunger Vital Signs:  Within the past 12 months we worried whether our food would run out before we got money to buy more. Never  Within the past 12 months the food we bought just didn't last and we didn't have money to get more. Never    Tami Lo, [employer]  "

## 2022-02-23 NOTE — PATIENT INSTRUCTIONS
Patient Education     Self-Care for Sore Throats     Sore throats happen for many reasons, such as colds, allergies, cigarette smoke, air pollution, and infections caused by viruses or bacteria. In any case, your throat becomes red and sore. Your goal for self-care is to reduce your discomfort while giving your throat a chance to heal.  Moisten and soothe your throat  Tips include the following:    Try a sip of water first thing after waking up.    Keep your throat moist by drinking 6 or more glasses of clear liquids every day.    Run a cool-air humidifier in your room overnight.    Stay away from cigarette smoke.     Check the air quality index,if air pollution gives you a sore throat. On high pollution days, try to limit outdoor time.    Suck on throat lozenges, cough drops, hard candy, ice chips, or frozen fruit-juice bars. Use the sugar-free versions if your diet or medical condition requires them.  Gargle to ease irritation  Gargling every hour or 2 can ease irritation. Try gargling with 1 of these solutions:    1/4 teaspoon of salt in 1/2 cup of warm water    An over-the-counter anesthetic gargle  Use medicine for more relief  Over-the-counter medicine can reduce sore throat symptoms. Ask your pharmacist if you have questions about which medicine to use. To prevent possible medicine interactions, let the pharmacist know what medicines you take. To decrease symptoms:    Ease pain with anesthetic sprays. Aspirin or an aspirin substitute also helps. Remember, never give aspirin to anyone 18 or younger. Don't take aspirin if you are already taking blood thinners.     For sore throats caused by allergies, try antihistamines to block the allergic reaction.  Unless a sore throat is caused by a bacterial infection, antibiotics won t help you.  Prevent future sore throats  Prevention tips include:    Stop smoking or reduce contact with secondhand smoke. Smoke irritates the tender throat lining.    Limit contact with  pets and with allergy-causing substances, such as pollen and mold.    Wash your hands often when you re around someone with a sore throat or cold. This will keep viruses or bacteria from spreading.    Limit outdoor time when air pollution is bad.    Don t strain your vocal cords.  When to call your healthcare provider  Contact your healthcare provider if you have:    Fever of 100.4 F (38.0 C) or higher, or as directed by your healthcare provider    White spots on the throat    Great Trouble swallowing    A skin rash    Recent exposure to someone else with strep bacteria    Severe hoarseness and swollen glands in the neck or jaw  Call 911  Call 911 if any of the following occur:    Trouble breathing or catching your breath    Drooling and problems swallowing    Wheezing    Unable to talk    Feeling dizzy or faint    Feeling of doom  Hailey last reviewed this educational content on 9/1/2019 2000-2021 [employer]. All rights reserved. This information is not intended as a substitute for professional medical care. Always follow your healthcare professional's instructions.

## 2022-02-23 NOTE — PROGRESS NOTES
"  Assessment & Plan     Throat pain  Sore throat could be from viral URI vs strep. Strep swab in process at this time. Discussed supportive cares for sore throat including cold liquids, lozenges, OTC analgesics. Will update patient on result and if abx needed. Discussed warning signs of when to return for care.   - Group A Streptococcus PCR Throat Swab    Maria Fernanda Jc MD  [facility]    [facility]   Phi is a 26 year old who presents for the following health issues     HPI     Sore throat   - 1 day of right sided sore throat   - right ear maybe hurts  - no headache or cough   - no congestion   - looser stools than normal   - symptomatic cares tried: lozenges  - no known sick contacts  - had covid 3 months ago           Review of Systems   Constitutional, HEENT, cardiovascular, pulmonary, gi and gu systems are negative, except as otherwise noted.      Objective    /84   Pulse 73   Temp 99.2  F (37.3  C) (Temporal)   Resp 16   Ht 1.664 m (5' 5.5\")   Wt 79.8 kg (176 lb)   SpO2 97%   BMI 28.84 kg/m    Body mass index is 28.84 kg/m .  Physical Exam   GENERAL: healthy, alert and no distress  EYES: Eyes grossly normal to inspection, PERRL and conjunctivae and sclerae normal  HENT: normal cephalic/atraumatic, ear canals and TM's normal, nose and mouth without ulcers or lesions, oral mucous membranes moist. Oropharynx erythematous  NECK: no adenopathy, no asymmetry, masses, or scars and thyroid normal to palpation  RESP: lungs clear to auscultation - no rales, rhonchi or wheezes  CV: regular rate and rhythm, normal S1 S2, no S3 or S4, no murmur, click or rub, no peripheral edema and peripheral pulses strong  ABDOMEN: soft, nontender, no hepatosplenomegaly, no masses and bowel sounds normal  PSYCH: mentation appears normal, affect normal/bright    No results found for this or any previous visit (from the past 24 hour(s)).            "

## 2022-05-14 ENCOUNTER — OFFICE VISIT (OUTPATIENT)
Dept: FAMILY MEDICINE | Facility: OTHER | Age: 27
End: 2022-05-14
Attending: FAMILY MEDICINE
Payer: MEDICARE

## 2022-05-14 VITALS
HEIGHT: 66 IN | BODY MASS INDEX: 29.68 KG/M2 | DIASTOLIC BLOOD PRESSURE: 78 MMHG | SYSTOLIC BLOOD PRESSURE: 124 MMHG | OXYGEN SATURATION: 97 % | HEART RATE: 70 BPM | TEMPERATURE: 98.3 F | RESPIRATION RATE: 14 BRPM | WEIGHT: 184.7 LBS

## 2022-05-14 DIAGNOSIS — L60.0 INGROWN TOENAIL: Primary | ICD-10-CM

## 2022-05-14 PROCEDURE — 99213 OFFICE O/P EST LOW 20 MIN: CPT | Performed by: FAMILY MEDICINE

## 2022-05-14 PROCEDURE — G0463 HOSPITAL OUTPT CLINIC VISIT: HCPCS

## 2022-05-14 RX ORDER — CEPHALEXIN 500 MG/1
500 CAPSULE ORAL 2 TIMES DAILY
Qty: 14 CAPSULE | Refills: 0 | Status: SHIPPED | OUTPATIENT
Start: 2022-05-14 | End: 2022-05-21

## 2022-05-14 ASSESSMENT — PAIN SCALES - GENERAL: PAINLEVEL: SEVERE PAIN (6)

## 2022-05-14 NOTE — LETTER
May 14, 2022      Phi Duncan  1995       To Whom It May Concern,       Phi Duncan was evaluated in urgent care 2022 for an ingrown toenail.       Sincerely,        Evette Escobedo MD

## 2022-05-14 NOTE — NURSING NOTE
Patient presents to the clinic for left great toe check. Patient states he has an ingrown toenail. He has done epsom salt soaks for treatment.  Mira LAL CMA...5/14/2022 5:11 PM          FOOD SECURITY SCREENING QUESTIONS  Hunger Vital Signs:  Within the past 12 months we worried whether our food would run out before we got money to buy more. Never  Within the past 12 months the food we bought just didn't last and we didn't have money to get more. Never  Medication Reconciliation: complete  Mira Mcarthur CMA 5/14/2022 5:11 PM

## 2022-05-14 NOTE — PROGRESS NOTES
"  Assessment & Plan       ICD-10-CM    1. Ingrown toenail  L60.0 Orthopedic  Referral     cephALEXin (KEFLEX) 500 MG capsule     Recommend soaking, wide toed shoes, keflex.   Referral placed to podiatry to more definitive treatment.  Note provided for work.        BMI:   Estimated body mass index is 30.27 kg/m  as calculated from the following:    Height as of this encounter: 1.664 m (5' 5.5\").    Weight as of this encounter: 83.8 kg (184 lb 11.2 oz).       No follow-ups on file.    Evette Escobedo MD  [facility]   Phi is a 26 year old who presents for the following health issues     Toenail      Patient has been having issues with his L big toe for the past 2 weeks.   He had a 25# weight dropped on his foot when he was 11 and has had problems since.   He states his toenail has been removed 3 times, has always grown back.   He has been soaking it, but is on his feet a lot for work..           Objective    /78 (BP Location: Right arm)   Pulse 70   Temp 98.3  F (36.8  C) (Tympanic)   Resp 14   Ht 1.664 m (5' 5.5\")   Wt 83.8 kg (184 lb 11.2 oz)   SpO2 97%   BMI 30.27 kg/m    Body mass index is 30.27 kg/m .  Physical Exam  Constitutional:       Appearance: He is well-developed.   HENT:      Right Ear: External ear normal.      Left Ear: External ear normal.   Eyes:      General: No scleral icterus.     Conjunctiva/sclera: Conjunctivae normal.   Cardiovascular:      Rate and Rhythm: Normal rate.   Pulmonary:      Effort: Pulmonary effort is normal. No respiratory distress.   Musculoskeletal:      Comments: L big toe with redness along the nail. No drainage. Tenderness to palpation.   Skin:     Findings: No rash.   Neurological:      Mental Status: He is alert.                     "

## 2022-05-25 ENCOUNTER — TELEPHONE (OUTPATIENT)
Dept: FAMILY MEDICINE | Facility: OTHER | Age: 27
End: 2022-05-25
Payer: MEDICARE

## 2022-05-25 DIAGNOSIS — M62.830 BACK MUSCLE SPASM: ICD-10-CM

## 2022-05-25 NOTE — TELEPHONE ENCOUNTER
Reason for call: Medication or medication refill    Name of medication requested: cyclobenzaprine (FLEXERIL) 10 MG tablet    Are you out of the medication? 3 pills    What pharmacy do you use? [facility]    Preferred method for responding to this message: Telephone Call    Phone number patient can be reached at: Home number on file [phone] (home)    If we cannot reach you directly, may we leave a detailed response at the number you provided? Yes    Bindu North on 5/25/2022 at 3:34 PM

## 2022-05-25 NOTE — TELEPHONE ENCOUNTER
Requested Prescriptions   Pending Prescriptions Disp Refills     cyclobenzaprine (FLEXERIL) 10 MG tablet 180 tablet 4     Sig: Take 1 tablet (10 mg) by mouth 2 times daily as needed for muscle spasms   Last Prescription Date:   6/6/19  Last Fill Qty/Refills:         180, R-4    Last Office Visit:              5/14/22   Future Office visit:           None    Carrie Machado RN .............. 5/25/2022  3:41 PM

## 2022-05-26 RX ORDER — CYCLOBENZAPRINE HCL 10 MG
10 TABLET ORAL 2 TIMES DAILY PRN
Qty: 180 TABLET | Refills: 1 | Status: SHIPPED | OUTPATIENT
Start: 2022-05-26 | End: 2022-06-02

## 2022-06-02 ENCOUNTER — TELEPHONE (OUTPATIENT)
Dept: FAMILY MEDICINE | Facility: OTHER | Age: 27
End: 2022-06-02
Payer: MEDICARE

## 2022-06-02 DIAGNOSIS — M62.830 BACK MUSCLE SPASM: Primary | ICD-10-CM

## 2022-06-03 NOTE — TELEPHONE ENCOUNTER
Left a message for patient to call us back to let him know about the change.  Shaye Yoon, ZION, LPN  6/3/2022  9:01 AM

## 2022-06-20 ENCOUNTER — TRANSFERRED RECORDS (OUTPATIENT)
Dept: HEALTH INFORMATION MANAGEMENT | Facility: CLINIC | Age: 27
End: 2022-06-20

## 2022-07-12 ENCOUNTER — HOSPITAL ENCOUNTER (EMERGENCY)
Facility: OTHER | Age: 27
Discharge: HOME | End: 2022-07-12
Payer: MEDICARE

## 2022-07-13 NOTE — ED NOTES
Patient left before triage. Patient had glued Left thumb to skin flap on L index finger. Cut was clean and free from signs or symptoms of infection. Finger's  using acetone wipe. Patient left immediately after.

## 2022-07-18 ENCOUNTER — TRANSFERRED RECORDS (OUTPATIENT)
Dept: HEALTH INFORMATION MANAGEMENT | Facility: OTHER | Age: 27
End: 2022-07-18

## 2022-07-28 ENCOUNTER — TELEPHONE (OUTPATIENT)
Dept: FAMILY MEDICINE | Facility: OTHER | Age: 27
End: 2022-07-28

## 2022-07-28 NOTE — TELEPHONE ENCOUNTER
Called patient regarding Adderall refill. Patient is being seen at [facility] for medication refills. Patient is requesting to change his medication provider to Kylie Faulkner DO or Maia Dodson NP as he is having issues with his current provider. Patient has scheduled a medication appointment to discuss these possible changes.   Summer Lopez LPN........................7/28/2022  3:53 PM

## 2022-07-28 NOTE — TELEPHONE ENCOUNTER
SMS-Reason for call: Medication or medication refill    Name of medication requested: Adderrall    Are you out of the medication? yes    What pharmacy do you use? [facility]    Preferred method for responding to this message: Telephone Call    Phone number patient can be reached at: Home number on file [phone] (home)    If we cannot reach you directly, may we leave a detailed response at the number you provided? Yes

## 2022-11-10 ENCOUNTER — OFFICE VISIT (OUTPATIENT)
Dept: FAMILY MEDICINE | Facility: OTHER | Age: 27
End: 2022-11-10
Attending: NURSE PRACTITIONER
Payer: MEDICARE

## 2022-11-10 ENCOUNTER — HOSPITAL ENCOUNTER (OUTPATIENT)
Dept: GENERAL RADIOLOGY | Facility: OTHER | Age: 27
Discharge: HOME | End: 2022-11-10
Attending: NURSE PRACTITIONER
Payer: MEDICARE

## 2022-11-10 VITALS
RESPIRATION RATE: 18 BRPM | WEIGHT: 148 LBS | DIASTOLIC BLOOD PRESSURE: 74 MMHG | HEIGHT: 66 IN | TEMPERATURE: 98.2 F | BODY MASS INDEX: 23.78 KG/M2 | OXYGEN SATURATION: 95 % | HEART RATE: 92 BPM | SYSTOLIC BLOOD PRESSURE: 120 MMHG

## 2022-11-10 DIAGNOSIS — M79.641 PAIN OF RIGHT HAND: Primary | ICD-10-CM

## 2022-11-10 DIAGNOSIS — M79.641 PAIN OF RIGHT HAND: ICD-10-CM

## 2022-11-10 PROCEDURE — G0463 HOSPITAL OUTPT CLINIC VISIT: HCPCS

## 2022-11-10 PROCEDURE — 99213 OFFICE O/P EST LOW 20 MIN: CPT | Performed by: NURSE PRACTITIONER

## 2022-11-10 PROCEDURE — G0463 HOSPITAL OUTPT CLINIC VISIT: HCPCS | Mod: 25

## 2022-11-10 PROCEDURE — 73130 X-RAY EXAM OF HAND: CPT | Mod: RT

## 2022-11-10 ASSESSMENT — PAIN SCALES - GENERAL: PAINLEVEL: MILD PAIN (2)

## 2022-11-10 NOTE — PROGRESS NOTES
"  Assessment & Plan   Problem List Items Addressed This Visit    None  Visit Diagnoses     Pain of right hand    -  Primary    pain over diastal metacarpal-4th/5th    Relevant Orders    XR Hand Right G/E 3 Views (Completed)         Right hand pain after recent injury occurring about 2 weeks ago.  X-ray was obtained and was within normal limits.  We discussed likely soft tissue injury.  Recommend ongoing symptomatic management and follow-up as needed.    Review of the result(s) of each unique test - xray  Ordering of each unique test  21 minutes spent on the date of the encounter doing chart review, history and exam, documentation and further activities per the note           No follow-ups on file.    BARRON Brunner [facility]    Subjective   Phi is a 26 year old, presenting for the following health issues:  Hand Injury      HPI     Right hand pain-had sex, felt light headed, had not drank much water all day. Fell over and hit hand on the wall. Had numbness in his hand. Feels pain in  Hand when he uses this. Hand feels weak. Occurred 2 weeks ago. Mild swelling.       Review of Systems   See above      Objective    /74   Pulse 92   Temp 98.2  F (36.8  C)   Resp 18   Ht 1.664 m (5' 5.5\")   Wt 67.1 kg (148 lb)   SpO2 95%   BMI 24.25 kg/m    Body mass index is 24.25 kg/m .  Physical Exam   GENERAL: healthy, alert and no distress  MS: no gross musculoskeletal defects noted, mildly tender over right distal fourth and fifth metatarsals with palpation, normal range of motion.  SKIN: no suspicious lesions or rashes  NEURO: Normal strength and tone, mentation intact and speech normal  PSYCH: mentation appears normal, affect normal/bright    Xray - Reviewed and interpreted by me.  Right hand x-ray with no acute findings                "

## 2022-11-10 NOTE — NURSING NOTE
Patient presents today for right hand injury over a week ago.    Medication Reconciliation Complete    Carline Khan LPN  11/10/2022 1:15 PM

## 2023-06-21 ENCOUNTER — HOSPITAL ENCOUNTER (EMERGENCY)
Facility: OTHER | Age: 28
Discharge: HOME | End: 2023-06-21
Attending: INTERNAL MEDICINE | Admitting: INTERNAL MEDICINE
Payer: MEDICARE

## 2023-06-21 DIAGNOSIS — S69.92XA: ICD-10-CM

## 2023-06-21 PROCEDURE — 90471 IMMUNIZATION ADMIN: CPT | Performed by: INTERNAL MEDICINE

## 2023-06-21 PROCEDURE — 99283 EMERGENCY DEPT VISIT LOW MDM: CPT | Mod: 25 | Performed by: INTERNAL MEDICINE

## 2023-06-21 PROCEDURE — 99283 EMERGENCY DEPT VISIT LOW MDM: CPT | Performed by: INTERNAL MEDICINE

## 2023-06-21 PROCEDURE — 250N000011 HC RX IP 250 OP 636: Performed by: INTERNAL MEDICINE

## 2023-06-21 PROCEDURE — 90715 TDAP VACCINE 7 YRS/> IM: CPT | Performed by: INTERNAL MEDICINE

## 2023-06-21 PROCEDURE — 250N000009 HC RX 250: Performed by: INTERNAL MEDICINE

## 2023-06-21 RX ORDER — GINSENG 100 MG
500 CAPSULE ORAL ONCE
Status: COMPLETED | OUTPATIENT
Start: 2023-06-21 | End: 2023-06-21

## 2023-06-21 RX ADMIN — CLOSTRIDIUM TETANI TOXOID ANTIGEN (FORMALDEHYDE INACTIVATED), CORYNEBACTERIUM DIPHTHERIAE TOXOID ANTIGEN (FORMALDEHYDE INACTIVATED), BORDETELLA PERTUSSIS TOXOID ANTIGEN (GLUTARALDEHYDE INACTIVATED), BORDETELLA PERTUSSIS FILAMENTOUS HEMAGGLUTININ ANTIGEN (FORMALDEHYDE INACTIVATED), BORDETELLA PERTUSSIS PERTACTIN ANTIGEN, AND BORDETELLA PERTUSSIS FIMBRIAE 2/3 ANTIGEN 0.5 ML: 5; 2; 2.5; 5; 3; 5 INJECTION, SUSPENSION INTRAMUSCULAR at 13:40

## 2023-06-21 RX ADMIN — BACITRACIN 1 G: 500 OINTMENT TOPICAL at 13:40

## 2023-06-21 ASSESSMENT — ACTIVITIES OF DAILY LIVING (ADL): ADLS_ACUITY_SCORE: 35

## 2023-06-21 NOTE — ED PROVIDER NOTES
Emergency Department Provider Note  : 1995 Age: 27 year old Sex: male MRN: [id number]    Chief Complaint   Patient presents with     Puncture Wound     Fish hook in palm of L hand       Medical Decision Making / Assessment / Plan   27 year old male presenting with left hand, palm, fishhook removed    ED Course as of 23 1400   Wed 2023   1332 Patient evaluated.  Has #4 fishhook stuck in his palm, left hand.  Approximately overlying the metacarpal head. No deep tissue penetration. No movement of hook with flexion/extension of fingers to indicate flexor tendon involvement.   Hand cleansed with Betadine, finger wrapped with cotton cord and hook removed with vice- without difficulty. Tolerated well. No immediate complications.   Bandage and Bacitracin applied.   Tetanus shot updated today, last administered 6.5 years ago.   1356 Use topical bacitracin and bandage, change daily and as needed.  Patient follow-up as needed for new or worsening symptoms.        A shared decision making model was used. Plan and all results were discussed  Time was taken to answer all questions. Patient and/or associated parties understood and were agreeable to treatment plan.  Strict return to Emergency Department precautions as well as appropriate follow up instructions were provided. The patient was discharged in stable condition.    New Prescriptions    No medications on file       Final diagnoses:   Fish hook injury of hand, left, initial encounter       Tomas Duffy MD  2023   Emergency Department    Subjective   Phi is a 27 year old male who presents at 12:41 PM with fishhook embedded in palm of left hand.  Hook is embedded just to the ulnar aspect of the left third metacarpal.  No associated involvement of deep tissues.  No movement of the hook with flexion or extension of the left third finger.    No other injuries.    Tetanus shot was 6.5 years ago.    I have reviewed the Medications, Allergies,  Past Medical and Surgical History, and Social History in the Epic System and with family.    Review of Systems:  Please see Subjective / HPI for pertinent positives and negatives. All other systems reviewed and found to be negative.      Objective   No data found.    Physical Exam:     General: Awake, alert, in no acute respiratory distress.  Cardiovascular: Peripheral pulses present.  Extremities: [place] embedded in palm of left hand.  Neurological: GCS 15, extremities without gross deficit.  Skin: Warm. No rash.    Psychiatric: Appropriate affect.     Procedures / Critical Care   Procedures    Aggregate Critical Care Time: None.     No orders of the defined types were placed in this encounter.      RESULTS: As noted above.          Medical/Surgical History:  Past Medical History:   Diagnosis Date     Attention-deficit hyperactivity disorder     No Comments Provided     Major depressive disorder, single episode     No Comments Provided     Personal history of other medical treatment (CODE)     No Comments Provided     Sleep deprivation     No Comments Provided     Past Surgical History:   Procedure Laterality Date     EXTRACTION(S) DENTAL N/A     x8; then braces placed     EXTRACTION(S) DENTAL      wisdom teeth     LACERATION REPAIR N/A 2015    to upper lip; done in OR     OPEN REDUCTION INTERNAL FIXATION TOE(S) Left 6/18/2020    Procedure: left 2nd toe ORIF, plantar plate repair, and excision of fracture fragment;  Surgeon: Avinash Singer DPM;  Location:  OR       Medications:  No current facility-administered medications for this encounter.     Current Outpatient Medications   Medication     amphetamine-dextroamphetamine (ADDERALL XR) 20 MG 24 hr capsule     cloNIDine (CATAPRES) 0.1 MG tablet     cyclobenzaprine (FLEXERIL) 10 MG tablet       Allergies:  Patient has no known allergies.    Relevant labs, images, EKGs, Epic and outside hospital (if applicable) charts were reviewed. The findings, diagnosis,  plan, and need for follow up were discussed with the patient/family. Nursing notes were reviewed.      Tomas Duffy MD  06/21/23 1400

## 2023-08-29 ENCOUNTER — OFFICE VISIT (OUTPATIENT)
Dept: FAMILY MEDICINE | Facility: OTHER | Age: 28
End: 2023-08-29
Attending: NURSE PRACTITIONER
Payer: MEDICARE

## 2023-08-29 VITALS
BODY MASS INDEX: 23.11 KG/M2 | OXYGEN SATURATION: 96 % | WEIGHT: 143.8 LBS | DIASTOLIC BLOOD PRESSURE: 75 MMHG | HEART RATE: 79 BPM | HEIGHT: 66 IN | TEMPERATURE: 99.2 F | RESPIRATION RATE: 18 BRPM | SYSTOLIC BLOOD PRESSURE: 117 MMHG

## 2023-08-29 DIAGNOSIS — H66.002 NON-RECURRENT ACUTE SUPPURATIVE OTITIS MEDIA OF LEFT EAR WITHOUT SPONTANEOUS RUPTURE OF TYMPANIC MEMBRANE: Primary | ICD-10-CM

## 2023-08-29 PROCEDURE — G0463 HOSPITAL OUTPT CLINIC VISIT: HCPCS

## 2023-08-29 PROCEDURE — 99213 OFFICE O/P EST LOW 20 MIN: CPT

## 2023-08-29 ASSESSMENT — PAIN SCALES - GENERAL: PAINLEVEL: WORST PAIN (10)

## 2023-08-30 NOTE — NURSING NOTE
"Chief Complaint   Patient presents with    Ear Problem     Left ear pain   Patient presents to clinic for pain in the left ear that started this morning.      Naomie Aaron on 8/29/2023 at 7:04 PM        Initial /75   Pulse 79   Temp 99.2  F (37.3  C) (Tympanic)   Resp 18   Ht 1.664 m (5' 5.5\")   Wt 65.2 kg (143 lb 12.8 oz)   SpO2 96%   BMI 23.57 kg/m   Estimated body mass index is 23.57 kg/m  as calculated from the following:    Height as of this encounter: 1.664 m (5' 5.5\").    Weight as of this encounter: 65.2 kg (143 lb 12.8 oz).       FOOD SECURITY SCREENING QUESTIONS:    The next two questions are to help us understand your food security.  If you are feeling you need any assistance in this area, we have resources available to support you today.    Hunger Vital Signs:  Within the past 12 months we worried whether our food would run out before we got money to buy more. Never  Within the past 12 months the food we bought just didn't last and we didn't have money to get more. Never      Naomie CALE Aaron     "

## 2023-08-30 NOTE — PATIENT INSTRUCTIONS
You have an ear infection (acute otitis media).     Take the Augmentin twice daily for the next 7 days.  Please note that you were given an overfilled because you got your medication out of the [employer] machine.  You may discontinue after 7 full days.  Complete the full dose even if you are feeling better. You may take your antibiotics with food.     You may take a daily probiotic while on antibiotics.    Follow up if symptoms are worsening or if symptoms are not improving within 2 days of starting antibiotics.

## 2023-08-30 NOTE — PROGRESS NOTES
ASSESSMENT/PLAN:    (H66.002) Non-recurrent acute suppurative otitis media of left ear without spontaneous rupture of tympanic membrane  (primary encounter diagnosis)  Comment: Patient presents with significant left sided otalgia, no otorrhea.  On exam left TM with erythema, bulging, and purulence, right TM is clear.  We will treat with Augmentin at this time.  Plan: amoxicillin-clavulanate (AUGMENTIN) 875-125 MG         tablet  You have an ear infection (acute otitis media).     Take the Augmentin twice daily for the next 7 days.  Please note that you were given an overfilled because you got your medication out of the [employer] machine.  You may discontinue after 7 full days.  Complete the full dose even if you are feeling better. You may take your antibiotics with food.     You may take a daily probiotic while on antibiotics.    Follow up if symptoms are worsening or if symptoms are not improving within 2 days of starting antibiotics.     Discussed warning signs/symptoms indicative of need to f/u    Follow up if symptoms persist or worsen or concerns    I have reviewed the nursing notes.  I have reviewed the findings, diagnosis, plan and need for follow up with the patient.    I explained my diagnostic considerations and recommendations to the patient, who voiced understanding and agreement with the treatment plan. All questions were answered. We discussed potential side effects of any prescribed or recommended therapies, as well as expectations for response to treatments.    BARRON CHÁVEZ CNP  8/29/2023  7:05 PM    HPI:    Phi Duncan is a 27 year old male  who presents to [facility] today for concerns of left-sided otalgia.    Patient reports that he has had left ear pain that started this morning. Pain is significant. No otorrhea. He has had a lot of nasal congestion for the past 2 days. No cough. Throat pain endorsed. He has a headache as well.     No known medication allergies.    PCP:  "Lucie    Past Medical History:   Diagnosis Date    Attention-deficit hyperactivity disorder     No Comments Provided    Major depressive disorder, single episode     No Comments Provided    Personal history of other medical treatment (CODE)     No Comments Provided    Sleep deprivation     No Comments Provided     Past Surgical History:   Procedure Laterality Date    EXTRACTION(S) DENTAL N/A     x8; then braces placed    EXTRACTION(S) DENTAL      wisdom teeth    LACERATION REPAIR N/A     to upper lip; done in OR    OPEN REDUCTION INTERNAL FIXATION TOE(S) Left 2020    Procedure: left 2nd toe ORIF, plantar plate repair, and excision of fracture fragment;  Surgeon: Avinash Singer DPM;  Location:  OR     Social History     Tobacco Use    Smoking status: Former     Packs/day: 0.10     Years: 8.00     Pack years: 0.80     Types: Cigarettes     Quit date: 6/15/2021     Years since quittin.2    Smokeless tobacco: Never    Tobacco comments:     6 a day-Quit smoking: trying to quit   Substance Use Topics    Alcohol use: Yes     Alcohol/week: 0.0 standard drinks of alcohol     Comment: occasionally      Current Outpatient Medications   Medication Sig Dispense Refill    amphetamine-dextroamphetamine (ADDERALL XR) 20 MG 24 hr capsule TAKE 1 CAPSULE BY MOUTH EVERY MORNING (Patient not taking: Reported on 2023)      cloNIDine (CATAPRES) 0.1 MG tablet TAKE 1 TABLET BY MOUTH AT BEDTIME AS NEEDED. MAY REPEAT DOSE IN 45 - 60 MINUTES IF NO EFFECT ON SLEEP / ANXIETY (Patient not taking: Reported on 2023)      cyclobenzaprine (FLEXERIL) 10 MG tablet  (Patient not taking: Reported on 2023)       No Known Allergies  Past medical history, past surgical history, current medications and allergies reviewed and accurate to the best of my knowledge.      ROS:  Refer to HPI    /75   Pulse 79   Temp 99.2  F (37.3  C) (Tympanic)   Resp 18   Ht 1.664 m (5' 5.5\")   Wt 65.2 kg (143 lb 12.8 oz)   SpO2 96%   " BMI 23.57 kg/m      EXAM:  General Appearance: Well appearing 27 year old male, appropriate appearance for age. No acute distress   Ears: Left TM intact, with erythema, bulging, and purulence.  Right TM intact, translucent with bony landmarks appreciated, no erythema, no effusion, no bulging, no purulence.  Left auditory canal clear.  Right auditory canal clear.  Normal external ears, non tender.  Eyes: conjunctivae normal without erythema or irritation, corneas clear, no drainage or crusting, no eyelid swelling, pupils equal   Oropharynx: moist mucous membranes, posterior pharynx without erythema, no exudates or petechiae, no post nasal drip seen, no trismus, voice clear.    Sinuses:  No sinus tenderness upon palpation of the frontal or maxillary sinuses  Nose:  Bilateral nares: no erythema, no edema, no drainage or congestion   Neck: supple   Respiratory: normal chest wall and respirations.  Normal effort.  Clear to auscultation bilaterally, no wheezing, crackles or rhonchi.  No increased work of breathing.  No cough appreciated.  Cardiac: RRR with no murmurs  Abdomen: soft, nontender, no rigidity, no rebound tenderness or guarding, normal bowel sounds present  Musculoskeletal:  Equal movement of bilateral upper extremities.  Equal movement of bilateral lower extremities.  Normal gait.    Dermatological: no rashes noted of exposed skin  Neuro: Alert and oriented to person, place, and time.  Cranial nerves II-XII grossly intact with no focal or lateralizing deficits.  Muscle tone normal.  Gait normal. No tremor.   Psychological: normal affect, alert, oriented, and pleasant.

## 2023-10-03 ENCOUNTER — APPOINTMENT (OUTPATIENT)
Dept: LAB | Facility: OTHER | Age: 28
End: 2023-10-03

## 2023-10-03 PROCEDURE — 36415 COLL VENOUS BLD VENIPUNCTURE: CPT | Performed by: INTERNAL MEDICINE

## 2023-10-09 ENCOUNTER — OFFICE VISIT (OUTPATIENT)
Dept: FAMILY MEDICINE | Facility: OTHER | Age: 28
End: 2023-10-09
Payer: MEDICARE

## 2023-10-09 VITALS
TEMPERATURE: 96.7 F | OXYGEN SATURATION: 97 % | BODY MASS INDEX: 23.99 KG/M2 | SYSTOLIC BLOOD PRESSURE: 101 MMHG | HEART RATE: 75 BPM | HEIGHT: 66 IN | RESPIRATION RATE: 16 BRPM | WEIGHT: 149.3 LBS | DIASTOLIC BLOOD PRESSURE: 54 MMHG

## 2023-10-09 DIAGNOSIS — H93.8X3 EAR PRESSURE, BILATERAL: Primary | ICD-10-CM

## 2023-10-09 DIAGNOSIS — H65.193 ACUTE MIDDLE EAR EFFUSION, BILATERAL: ICD-10-CM

## 2023-10-09 PROCEDURE — G0463 HOSPITAL OUTPT CLINIC VISIT: HCPCS

## 2023-10-09 PROCEDURE — 99213 OFFICE O/P EST LOW 20 MIN: CPT | Performed by: NURSE PRACTITIONER

## 2023-10-09 RX ORDER — METHYLPHENIDATE HYDROCHLORIDE 36 MG/1
36 TABLET ORAL EVERY MORNING
COMMUNITY
Start: 2023-08-30

## 2023-10-09 RX ORDER — BUSPIRONE HYDROCHLORIDE 15 MG/1
1 TABLET ORAL
COMMUNITY
Start: 2023-06-12 | End: 2024-04-25

## 2023-10-09 ASSESSMENT — PAIN SCALES - GENERAL: PAINLEVEL: MODERATE PAIN (5)

## 2023-10-09 NOTE — PROGRESS NOTES
ASSESSMENT/PLAN:     I have reviewed the nursing notes.  I have reviewed the findings, diagnosis, plan and need for follow up with the patient.        1. Ear pressure, bilateral  2. Acute middle ear effusion, bilateral    No ear infection on exam.  Antibiotics not warranted.    May use over the counter ear pain drops PRN  May use over-the-counter Tylenol or ibuprofen PRN    Discussed warning signs/symptoms indicative of need to f/u  Follow up if symptoms persist or worsen or concerns      I explained my diagnostic considerations and recommendations to the patient, who voiced understanding and agreement with the treatment plan. All questions were answered. We discussed potential side effects of any prescribed or recommended therapies, as well as expectations for response to treatments.    Vera Hedrick NP  [facility]      SUBJECTIVE:   Phi Duncna is a 27 year old male who presents to clinic today for the following health issues:  Ear pain    HPI  Bilateral ear pain, pressure, and plugged sensation for the past 5 days.  Runny nose.  Denies sore throat and cough.  No fevers.  Daily headaches.  Taking Ibuprofen   Last antibiotic use was Augmentin prescribed on 8/29/23 for left ear infection.        Past Medical History:   Diagnosis Date    Attention-deficit hyperactivity disorder     No Comments Provided    Major depressive disorder, single episode     No Comments Provided    Personal history of other medical treatment (CODE)     No Comments Provided    Sleep deprivation     No Comments Provided     Past Surgical History:   Procedure Laterality Date    EXTRACTION(S) DENTAL N/A     x8; then braces placed    EXTRACTION(S) DENTAL      wisdom teeth    LACERATION REPAIR N/A 2015    to upper lip; done in OR    OPEN REDUCTION INTERNAL FIXATION TOE(S) Left 6/18/2020    Procedure: left 2nd toe ORIF, plantar plate repair, and excision of fracture fragment;  Surgeon: Avinash Singer DPM;  Location:   "OR     Social History     Tobacco Use    Smoking status: Former     Packs/day: 0.10     Years: 8.00     Pack years: 0.80     Types: Cigarettes     Quit date: 6/15/2021     Years since quittin.3    Smokeless tobacco: Never    Tobacco comments:     6 a day-Quit smoking: trying to quit   Substance Use Topics    Alcohol use: Yes     Alcohol/week: 0.0 standard drinks of alcohol     Comment: occasionally      Current Outpatient Medications   Medication Sig Dispense Refill    busPIRone (BUSPAR) 15 MG tablet Take 1 tablet by mouth 3 times daily      methylphenidate HCl ER, OSM, (CONCERTA) 36 MG CR tablet Take 36 mg by mouth every morning      amoxicillin-clavulanate (AUGMENTIN) 875-125 MG tablet Take 1 tablet by mouth 2 times daily (Patient not taking: Reported on 10/9/2023) 20 tablet 0    amphetamine-dextroamphetamine (ADDERALL XR) 20 MG 24 hr capsule TAKE 1 CAPSULE BY MOUTH EVERY MORNING (Patient not taking: Reported on 2023)      cloNIDine (CATAPRES) 0.1 MG tablet TAKE 1 TABLET BY MOUTH AT BEDTIME AS NEEDED. MAY REPEAT DOSE IN 45 - 60 MINUTES IF NO EFFECT ON SLEEP / ANXIETY (Patient not taking: Reported on 2023)      cyclobenzaprine (FLEXERIL) 10 MG tablet  (Patient not taking: Reported on 2023)       No Known Allergies      Past medical history, past surgical history, current medications and allergies reviewed and accurate to the best of my knowledge.        OBJECTIVE:     /54 (BP Location: Right arm, Patient Position: Sitting, Cuff Size: Adult Regular)   Pulse 75   Temp (!) 96.7  F (35.9  C) (Temporal)   Resp 16   Ht 1.676 m (5' 6\")   Wt 67.7 kg (149 lb 4.8 oz)   SpO2 97%   BMI 24.10 kg/m    Body mass index is 24.1 kg/m .      Physical Exam  General Appearance: Well appearing adult male, appropriate appearance for age. No acute distress  Ears: Bilateral TMs intact, no erythema, serous effusions with bulging, no purulence.  Bilateral auditory canals clear without drainage or bleeding.  " Normal external ears, non tender.  Eyes: conjunctivae normal without erythema or irritation, corneas clear, no drainage or crusting, no eyelid swelling, pupils equal   Orophayrnx: voice clear.    Nose:  No noted drainage   Respiratory: normal chest wall and respirations.  Normal effort. No cough appreciated.  Musculoskeletal:  Equal movement of bilateral upper extremities.  Equal movement of bilateral lower extremities.  Normal gait.    Psychological: normal affect, alert, oriented, and pleasant.

## 2023-10-09 NOTE — NURSING NOTE
"Pt presents to  with his son. Pt having ear pain x4 days - he has been taking Ibuprofen PRN for pain.    Chief Complaint   Patient presents with    Otalgia       FOOD SECURITY SCREENING QUESTIONS  Hunger Vital Signs:  Within the past 12 months we worried whether our food would run out before we got money to buy more. Never  Within the past 12 months the food we bought just didn't last and we didn't have money to get more. Never  Carrie Dearmon 10/9/2023 3:49 PM      Initial /54 (BP Location: Right arm, Patient Position: Sitting, Cuff Size: Adult Regular)   Pulse 75   Temp (!) 96.7  F (35.9  C) (Temporal)   Resp 16   Ht 1.676 m (5' 6\")   Wt 67.7 kg (149 lb 4.8 oz)   SpO2 97%   BMI 24.10 kg/m   Estimated body mass index is 24.1 kg/m  as calculated from the following:    Height as of this encounter: 1.676 m (5' 6\").    Weight as of this encounter: 67.7 kg (149 lb 4.8 oz).  Medication Reconciliation: complete    Carrie Dearmon    "

## 2024-02-23 ENCOUNTER — OFFICE VISIT (OUTPATIENT)
Dept: FAMILY MEDICINE | Facility: OTHER | Age: 29
End: 2024-02-23
Payer: MEDICARE

## 2024-02-23 VITALS
WEIGHT: 165.3 LBS | BODY MASS INDEX: 26.57 KG/M2 | OXYGEN SATURATION: 96 % | HEIGHT: 66 IN | TEMPERATURE: 98.2 F | HEART RATE: 78 BPM | DIASTOLIC BLOOD PRESSURE: 72 MMHG | SYSTOLIC BLOOD PRESSURE: 110 MMHG | RESPIRATION RATE: 16 BRPM

## 2024-02-23 DIAGNOSIS — A63.0 CONDYLOMATA ACUMINATA IN MALE: Primary | ICD-10-CM

## 2024-02-23 PROCEDURE — 99213 OFFICE O/P EST LOW 20 MIN: CPT | Performed by: REGISTERED NURSE

## 2024-02-23 PROCEDURE — G0463 HOSPITAL OUTPT CLINIC VISIT: HCPCS

## 2024-02-23 ASSESSMENT — PAIN SCALES - GENERAL: PAINLEVEL: NO PAIN (0)

## 2024-02-23 NOTE — NURSING NOTE
"Chief Complaint   Patient presents with    Wart     X1 month       Patient here for 2 warts on penis. He states they have been there x1 months.    Initial /72   Pulse 78   Temp 98.2  F (36.8  C) (Tympanic)   Resp 16   Ht 1.664 m (5' 5.5\")   Wt 75 kg (165 lb 4.8 oz)   SpO2 96%   BMI 27.09 kg/m   Estimated body mass index is 27.09 kg/m  as calculated from the following:    Height as of this encounter: 1.664 m (5' 5.5\").    Weight as of this encounter: 75 kg (165 lb 4.8 oz).  Medication Review: complete    The next two questions are to help us understand your food security.  If you are feeling you need any assistance in this area, we have resources available to support you today.          2/23/2024   SDOH- Food Insecurity   Within the past 12 months, did you worry that your food would run out before you got money to buy more? N   Within the past 12 months, did the food you bought just not last and you didn t have money to get more? N         Health Care Directive:  Patient does not have a Health Care Directive or Living Will: Discussed advance care planning with patient; however, patient declined at this time.    Romi Rich LPN      "

## 2024-02-23 NOTE — PROGRESS NOTES
"Phi Duncan  1995    ASSESSMENT/PLAN:   1. Condylomata acuminata in male  Two genital lesions on shaft of penis.  I do believe dermatology referral would be most appropriate especially for pain control with removal.  Patient is agreeable to plan.  Referral placed.    - Adult Dermatology  Referral; Future       Patient agrees with plan of care and verbalizes understating. AVS printed. Patient education provided verbally and written instructions provided as requested. Patient made aware of emergent signs and symptoms to monitor for and when to seek additional care/follow up.     SUBJECTIVE:   CHIEF COMPLAINT/ REASON FOR VISIT  Patient presents with:  Wart: X1 month       HISTORY OF PRESENT ILLNESS  Phi Duncan is a pleasant 28 year old male presents to rapid clinic today for evaluation of 2 lesions on the shaft of his penis that have been present for 1 month.  Denies pain, bleeding, itching or change in size.  He has not had anything like this previously.  He is sexually active.  Declines needing STI screening today.      I have reviewed the nursing notes.  I have reviewed allergies, medication list, problem list, and past medical history.    REVIEW OF SYSTEMS  See HPI    VITAL SIGNS  Vitals:    02/23/24 1642   BP: 110/72   Pulse: 78   Resp: 16   Temp: 98.2  F (36.8  C)   TempSrc: Tympanic   SpO2: 96%   Weight: 75 kg (165 lb 4.8 oz)   Height: 1.664 m (5' 5.5\")      Body mass index is 27.09 kg/m .    OBJECTIVE:   PHYSICAL EXAM  Physical Exam  Constitutional:       Appearance: Normal appearance. He is not ill-appearing or toxic-appearing.   Genitourinary:     Comments: 2 raised, cauliflower appearing lesions to shaft of penis  Neurological:      Mental Status: He is alert.          DIAGNOSTICS  No results found for any visits on 02/23/24.     BARRON Mooney [facility]  "

## 2024-04-25 ENCOUNTER — OFFICE VISIT (OUTPATIENT)
Dept: FAMILY MEDICINE | Facility: OTHER | Age: 29
End: 2024-04-25
Attending: NURSE PRACTITIONER

## 2024-04-25 VITALS
TEMPERATURE: 99.4 F | HEIGHT: 66 IN | WEIGHT: 170 LBS | HEART RATE: 87 BPM | BODY MASS INDEX: 27.32 KG/M2 | SYSTOLIC BLOOD PRESSURE: 126 MMHG | DIASTOLIC BLOOD PRESSURE: 76 MMHG | RESPIRATION RATE: 24 BRPM | OXYGEN SATURATION: 95 %

## 2024-04-25 DIAGNOSIS — J02.9 SORE THROAT: ICD-10-CM

## 2024-04-25 DIAGNOSIS — J02.0 STREP PHARYNGITIS: Primary | ICD-10-CM

## 2024-04-25 LAB — GROUP A STREP BY PCR: DETECTED

## 2024-04-25 PROCEDURE — 99213 OFFICE O/P EST LOW 20 MIN: CPT | Performed by: NURSE PRACTITIONER

## 2024-04-25 PROCEDURE — 87651 STREP A DNA AMP PROBE: CPT | Mod: ZL | Performed by: NURSE PRACTITIONER

## 2024-04-25 RX ORDER — PENICILLIN V POTASSIUM 500 MG/1
500 TABLET, FILM COATED ORAL 2 TIMES DAILY
Qty: 20 TABLET | Refills: 0 | Status: SHIPPED | OUTPATIENT
Start: 2024-04-25 | End: 2024-05-05

## 2024-04-25 ASSESSMENT — PAIN SCALES - GENERAL: PAINLEVEL: SEVERE PAIN (6)

## 2024-04-25 NOTE — PROGRESS NOTES
ASSESSMENT/PLAN:     I have reviewed the nursing notes.  I have reviewed the findings, diagnosis, plan and need for follow up with the patient.        1. Sore throat    - Group A Streptococcus PCR Throat Swab    2. Strep pharyngitis    - penicillin V (VEETID) 500 MG tablet; Take 1 tablet (500 mg) by mouth 2 times daily for 10 days  Dispense: 20 tablet; Refill: 0      Positive Strep PCR test    New toothbrush in 2 days   Symptomatic treatment - Encouraged fluids, salt water gargles, honey, elevation, humidifier, saline nasal spray, lozenges, tea, soup, smoothies, popsicles, etc     May use over-the-counter Tylenol or ibuprofen PRN    Discussed warning signs/symptoms indicative of need to f/u  Follow up if symptoms persist or worsen or concerns      I explained my diagnostic considerations and recommendations to the patient, who voiced understanding and agreement with the treatment plan. All questions were answered. We discussed potential side effects of any prescribed or recommended therapies, as well as expectations for response to treatments.    Vera Hedrick NP  [facility]      SUBJECTIVE:   Phi Duncan is a 28 year old male who presents to clinic today for the following health issues:  Sore throat      HPI  Patient with sore throat and runny/stuffy nose for 2 days.    No fevers or chills.  Cough started today.  Cough is congested with post tussive emesis today.    Chest heaviness and soreness from coughing.  No shortness of breath or chest tightness.    No stomach pain.  Appetite decreased due to post tussive vomiting.  No headaches.  Body aches started today.    No OTC medications         Past Medical History:   Diagnosis Date    Attention-deficit hyperactivity disorder     No Comments Provided    Major depressive disorder, single episode     No Comments Provided    Personal history of other medical treatment (CODE)     No Comments Provided    Sleep deprivation     No Comments  Provided     Past Surgical History:   Procedure Laterality Date    EXTRACTION(S) DENTAL N/A     x8; then braces placed    EXTRACTION(S) DENTAL      wisdom teeth    LACERATION REPAIR N/A 2015    to upper lip; done in OR    OPEN REDUCTION INTERNAL FIXATION TOE(S) Left 2020    Procedure: left 2nd toe ORIF, plantar plate repair, and excision of fracture fragment;  Surgeon: Avinash Singer DPM;  Location:  OR     Social History     Tobacco Use    Smoking status: Former     Current packs/day: 0.00     Average packs/day: 0.1 packs/day for 8.0 years (0.8 ttl pk-yrs)     Types: Cigarettes     Start date: 6/15/2013     Quit date: 6/15/2021     Years since quittin.8    Smokeless tobacco: Never    Tobacco comments:     6 a day-Quit smoking: trying to quit   Substance Use Topics    Alcohol use: Yes     Alcohol/week: 0.0 standard drinks of alcohol     Comment: occasionally      Current Outpatient Medications   Medication Sig Dispense Refill    methylphenidate HCl ER, OSM, (CONCERTA) 36 MG CR tablet Take 36 mg by mouth every morning      amoxicillin-clavulanate (AUGMENTIN) 875-125 MG tablet Take 1 tablet by mouth 2 times daily (Patient not taking: Reported on 10/9/2023) 20 tablet 0    amphetamine-dextroamphetamine (ADDERALL XR) 20 MG 24 hr capsule TAKE 1 CAPSULE BY MOUTH EVERY MORNING (Patient not taking: Reported on 2023)      busPIRone (BUSPAR) 15 MG tablet Take 1 tablet by mouth 3 times daily (Patient not taking: Reported on 2024)      cloNIDine (CATAPRES) 0.1 MG tablet TAKE 1 TABLET BY MOUTH AT BEDTIME AS NEEDED. MAY REPEAT DOSE IN 45 - 60 MINUTES IF NO EFFECT ON SLEEP / ANXIETY (Patient not taking: Reported on 2023)      cyclobenzaprine (FLEXERIL) 10 MG tablet  (Patient not taking: Reported on 2023)       No Known Allergies      Past medical history, past surgical history, current medications and allergies reviewed and accurate to the best of my knowledge.        OBJECTIVE:     /76 (BP  "Location: Left arm, Patient Position: Sitting, Cuff Size: Adult Regular)   Pulse 87   Temp 99.4  F (37.4  C) (Tympanic)   Resp 24   Ht 1.676 m (5' 6\")   Wt 77.1 kg (170 lb)   SpO2 95%   BMI 27.44 kg/m    Body mass index is 27.44 kg/m .      Physical Exam  General Appearance: Well appearing adult male, appropriate appearance for age. No acute distress  Orophayrnx: moist mucous membranes, pharynx with erythema, tonsils without hypertrophy, tonsils with erythema, no tonsillar exudates, no oral lesions, no palate petechiae, phlegm present, no trismus, voice clear.    Nose:  congestion with sniffling   Neck:   Bilateral tonsillar lymph node enlargement   Respiratory: normal chest wall and respirations.  Normal effort.  Clear to auscultation bilaterally, no wheezing, crackles or rhonchi.  No increased work of breathing.  Frequent congested cough appreciated.  Cardiac: RRR with no murmurs  Musculoskeletal:  Equal movement of bilateral upper extremities.  Equal movement of bilateral lower extremities.  Normal gait.    Psychological: normal affect, alert, oriented, and pleasant.       Labs:  Results for orders placed or performed in visit on 04/25/24   Group A Streptococcus PCR Throat Swab     Status: Abnormal    Specimen: Throat; Swab   Result Value Ref Range    Group A strep by PCR Detected (A) Not Detected    Narrative    The Xpert Xpress Strep A test, performed on the [employer], is a rapid, qualitative in vitro diagnostic test for the detection of Streptococcus pyogenes (Group A ß-hemolytic Streptococcus, Strep A) in throat swab specimens from patients with signs and symptoms of pharyngitis. The Xpert Xpress Strep A test can be used as an aid in the diagnosis of Group A Streptococcal pharyngitis. The assay is not intended to monitor treatment for Group A Streptococcus infections. The Xpert Xpress Strep A test utilizes an automated real-time polymerase chain reaction (PCR) to detect " Streptococcus pyogenes DNA.

## 2024-04-25 NOTE — NURSING NOTE
"Patient presents to  for sore throat x2 days.     Chief Complaint   Patient presents with    Pharyngitis       FOOD SECURITY SCREENING QUESTIONS  Hunger Vital Signs:  Within the past 12 months we worried whether our food would run out before we got money to buy more. Never  Within the past 12 months the food we bought just didn't last and we didn't have money to get more. Never  Carriejulisa Bonds 4/25/2024 3:50 PM      Initial /76 (BP Location: Left arm, Patient Position: Sitting, Cuff Size: Adult Regular)   Pulse 87   Temp 99.4  F (37.4  C) (Tympanic)   Resp 24   Ht 1.676 m (5' 6\")   Wt 77.1 kg (170 lb)   SpO2 95%   BMI 27.44 kg/m   Estimated body mass index is 27.44 kg/m  as calculated from the following:    Height as of this encounter: 1.676 m (5' 6\").    Weight as of this encounter: 77.1 kg (170 lb).  Medication Reconciliation: complete    Carrie Bonds  "

## 2024-05-31 ENCOUNTER — HOSPITAL ENCOUNTER (EMERGENCY)
Facility: OTHER | Age: 29
Discharge: LEFT BEFORE DISCHARGE | End: 2024-05-31

## 2024-05-31 VITALS
DIASTOLIC BLOOD PRESSURE: 79 MMHG | SYSTOLIC BLOOD PRESSURE: 137 MMHG | HEART RATE: 83 BPM | WEIGHT: 170 LBS | HEIGHT: 66 IN | TEMPERATURE: 97.3 F | RESPIRATION RATE: 16 BRPM | BODY MASS INDEX: 27.32 KG/M2 | OXYGEN SATURATION: 96 %

## 2024-05-31 RX ORDER — PRAMIPEXOLE DIHYDROCHLORIDE 0.5 MG/1
TABLET ORAL
COMMUNITY
Start: 2024-05-26

## 2024-05-31 ASSESSMENT — COLUMBIA-SUICIDE SEVERITY RATING SCALE - C-SSRS
2. HAVE YOU ACTUALLY HAD ANY THOUGHTS OF KILLING YOURSELF IN THE PAST MONTH?: NO
1. IN THE PAST MONTH, HAVE YOU WISHED YOU WERE DEAD OR WISHED YOU COULD GO TO SLEEP AND NOT WAKE UP?: NO
6. HAVE YOU EVER DONE ANYTHING, STARTED TO DO ANYTHING, OR PREPARED TO DO ANYTHING TO END YOUR LIFE?: NO

## 2024-05-31 NOTE — ED TRIAGE NOTES
Pt presents to ED with c/o bee sting under left eye. Pt's left eye is swollen and red, no swelling of lips or tongue. Breathing is non labored.    Ora Piña RN on 5/31/2024 at 3:52 PM       Triage Assessment (Adult)       Row Name 05/31/24 1551          Skin Circulation/Temperature WDL    Skin Circulation/Temperature WDL X  redness and swelling on left eye

## 2024-09-26 ENCOUNTER — OFFICE VISIT (OUTPATIENT)
Dept: FAMILY MEDICINE | Facility: OTHER | Age: 29
End: 2024-09-26

## 2024-09-26 VITALS
BODY MASS INDEX: 26.36 KG/M2 | WEIGHT: 164 LBS | OXYGEN SATURATION: 96 % | TEMPERATURE: 98 F | SYSTOLIC BLOOD PRESSURE: 134 MMHG | RESPIRATION RATE: 19 BRPM | HEART RATE: 69 BPM | DIASTOLIC BLOOD PRESSURE: 78 MMHG | HEIGHT: 66 IN

## 2024-09-26 DIAGNOSIS — J02.9 SORE THROAT: ICD-10-CM

## 2024-09-26 DIAGNOSIS — J02.0 STREP THROAT: Primary | ICD-10-CM

## 2024-09-26 LAB — GROUP A STREP BY PCR: DETECTED

## 2024-09-26 PROCEDURE — 87651 STREP A DNA AMP PROBE: CPT | Mod: ZL | Performed by: STUDENT IN AN ORGANIZED HEALTH CARE EDUCATION/TRAINING PROGRAM

## 2024-09-26 PROCEDURE — 99213 OFFICE O/P EST LOW 20 MIN: CPT | Performed by: STUDENT IN AN ORGANIZED HEALTH CARE EDUCATION/TRAINING PROGRAM

## 2024-09-26 RX ORDER — PENICILLIN V POTASSIUM 500 MG/1
500 TABLET, FILM COATED ORAL 2 TIMES DAILY
Qty: 20 TABLET | Refills: 0 | Status: SHIPPED | OUTPATIENT
Start: 2024-09-26 | End: 2024-10-06

## 2024-09-26 ASSESSMENT — PAIN SCALES - GENERAL: PAINLEVEL: MODERATE PAIN (4)

## 2024-09-26 NOTE — NURSING NOTE
"Chief Complaint   Patient presents with    Pharyngitis    Nasal Congestion    Headache     Patient here for sore throat, runny nose, fever/chills, headache x1 day. Has been treating with Anshu.     Initial /78   Pulse 69   Temp 98  F (36.7  C) (Tympanic)   Resp 19   Ht 1.664 m (5' 5.5\")   Wt 74.4 kg (164 lb)   SpO2 96%   BMI 26.88 kg/m   Estimated body mass index is 26.88 kg/m  as calculated from the following:    Height as of this encounter: 1.664 m (5' 5.5\").    Weight as of this encounter: 74.4 kg (164 lb).  Medication Review: complete    The next two questions are to help us understand your food security.  If you are feeling you need any assistance in this area, we have resources available to support you today.          9/26/2024   SDOH- Food Insecurity   Within the past 12 months, did you worry that your food would run out before you got money to buy more? N   Within the past 12 months, did the food you bought just not last and you didn t have money to get more? N            Health Care Directive:  Patient does not have a Health Care Directive or Living Will: Discussed advance care planning with patient; however, patient declined at this time.    Romi Rich LPN      "

## 2024-09-26 NOTE — PROGRESS NOTES
"  Assessment & Plan     (J02.0) Strep throat  (primary encounter diagnosis)    Comment: Strep pharyngitis.  No evidence of peritonsillar abscess.  Vital signs are stable.  Otherwise appears well tolerating oral intake.    Plan: penicillin V (VEETID) 500 MG tablet          Penicillin twice a day for 10 days.  Change toothbrush and wash water bottle.  Follow-up with PCP for any persisting symptoms.  Return to rapid clinic or ER for worsening or changing symptoms.  He is comfortable and agreeable with this plan.      (J02.9) Sore throat  Comment: Strep pharyngitis.  Plan: Group A Streptococcus PCR Throat Swab          Subjective   Phi is a 28 year old, presenting for the following health issues:  Pharyngitis, Nasal Congestion, and Headache    HPI    Patient presents today with concerns of sore throat, nasal congestion, headache.  States symptoms started yesterday, worse today.  He does note exposure to strep via his niece and nephew.  Intermittent low-grade temperatures.  He has been using ibuprofen and Tylenol as needed.      Review of Systems  Constitutional, HEENT, cardiovascular, pulmonary, gi and gu systems are negative, except as otherwise noted.        Objective    /78   Pulse 69   Temp 98  F (36.7  C) (Tympanic)   Resp 19   Ht 1.664 m (5' 5.5\")   Wt 74.4 kg (164 lb)   SpO2 96%   BMI 26.88 kg/m    Body mass index is 26.88 kg/m .    Physical Exam     GENERAL: alert and no distress  EYES: Eyes grossly normal to inspection, PERRL and conjunctivae and sclerae normal  HENT: ear canals and TM's normal, nose clear, pharynx with slight erythema, scant petechiae, no edema or exudates  NECK: shotty anterior cervical adenopathy, no asymmetry, masses, or scars  RESP: lungs clear to auscultation - no rales, rhonchi or wheezes  CV: regular rate and rhythm, normal S1 S2  MS: no gross musculoskeletal defects noted, no edema    Results for orders placed or performed in visit on 09/26/24   Group A Streptococcus PCR " Throat Swab     Status: Abnormal    Specimen: Throat; Swab   Result Value Ref Range    Group A strep by PCR Detected (A) Not Detected    Narrative    The Xpert Xpress Strep A test, performed on the [employer], is a rapid, qualitative in vitro diagnostic test for the detection of Streptococcus pyogenes (Group A ß-hemolytic Streptococcus, Strep A) in throat swab specimens from patients with signs and symptoms of pharyngitis. The Xpert Xpress Strep A test can be used as an aid in the diagnosis of Group A Streptococcal pharyngitis. The assay is not intended to monitor treatment for Group A Streptococcus infections. The Xpert Xpress Strep A test utilizes an automated real-time polymerase chain reaction (PCR) to detect Streptococcus pyogenes DNA.         Signed Electronically by: Nery Leon PA-C

## 2024-09-26 NOTE — LETTER
September 26, 2024      Phi Duncan  [address] [address]        To Whom It May Concern:    Phi Duncan was seen on 9/26/24. Please excuse him today and tomorrow due to illness.         Sincerely,        Nery Leon PA-C

## 2024-10-03 ENCOUNTER — TRANSFERRED RECORDS (OUTPATIENT)
Dept: HEALTH INFORMATION MANAGEMENT | Facility: OTHER | Age: 29
End: 2024-10-03

## 2024-12-09 ENCOUNTER — HOSPITAL ENCOUNTER (EMERGENCY)
Facility: OTHER | Age: 29
Discharge: HOME | End: 2024-12-09
Attending: PHYSICIAN ASSISTANT

## 2024-12-09 VITALS
DIASTOLIC BLOOD PRESSURE: 87 MMHG | BODY MASS INDEX: 28.61 KG/M2 | SYSTOLIC BLOOD PRESSURE: 143 MMHG | HEIGHT: 66 IN | TEMPERATURE: 97 F | WEIGHT: 178 LBS | OXYGEN SATURATION: 94 % | RESPIRATION RATE: 16 BRPM | HEART RATE: 103 BPM

## 2024-12-09 DIAGNOSIS — S61.411A LACERATION OF RIGHT HAND WITHOUT FOREIGN BODY, INITIAL ENCOUNTER: ICD-10-CM
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
## 2024-12-09 PROCEDURE — 99283 EMERGENCY DEPT VISIT LOW MDM: CPT | Mod: 25 | Performed by: PHYSICIAN ASSISTANT

## 2024-12-09 PROCEDURE — 12001 RPR S/N/AX/GEN/TRNK 2.5CM/<: CPT | Performed by: PHYSICIAN ASSISTANT

## 2024-12-09 PROCEDURE — 250N000009 HC RX 250: Performed by: PHYSICIAN ASSISTANT

## 2024-12-09 RX ORDER — LIDOCAINE/RACEPINEP/TETRACAINE 4-0.05-0.5
GEL WITH PREFILLED APPLICATOR (ML) TOPICAL ONCE
Status: COMPLETED | OUTPATIENT
Start: 2024-12-09 | End: 2024-12-09

## 2024-12-09 RX ORDER — GINSENG 100 MG
500 CAPSULE ORAL ONCE
Status: COMPLETED | OUTPATIENT
Start: 2024-12-09 | End: 2024-12-09

## 2024-12-09 RX ADMIN — BACITRACIN 1 G: 500 OINTMENT TOPICAL at 10:24

## 2024-12-09 RX ADMIN — Medication: at 10:24

## 2024-12-09 ASSESSMENT — COLUMBIA-SUICIDE SEVERITY RATING SCALE - C-SSRS
6. HAVE YOU EVER DONE ANYTHING, STARTED TO DO ANYTHING, OR PREPARED TO DO ANYTHING TO END YOUR LIFE?: NO
2. HAVE YOU ACTUALLY HAD ANY THOUGHTS OF KILLING YOURSELF IN THE PAST MONTH?: NO
1. IN THE PAST MONTH, HAVE YOU WISHED YOU WERE DEAD OR WISHED YOU COULD GO TO SLEEP AND NOT WAKE UP?: NO

## 2024-12-09 ASSESSMENT — ACTIVITIES OF DAILY LIVING (ADL)
ADLS_ACUITY_SCORE: 41
ADLS_ACUITY_SCORE: 41

## 2024-12-09 NOTE — Clinical Note
Phi Duncan was seen and treated in our emergency department on 12/9/2024.  He may return to work on 12/09/2024.  Keep dressing clean and dry.  No submersion to do any chaudhry, chemicals, etc. until wound is completely healed.  Sutures out 10 to 14 days.     If you have any questions or concerns, please don't hesitate to call.      Adrian Caballero PA-C

## 2024-12-09 NOTE — ED TRIAGE NOTES
Pt arrives via private vehicle from work with c/o laceration on right palm. Pt cut it on a tin can, attempting to catch it. Steri strip in place, Band-Aid over. Bleeding under control.     Triage Assessment (Adult)       Row Name 12/09/24 0908          Triage Assessment    Airway WDL WDL        Respiratory WDL    Respiratory WDL WDL        Skin Circulation/Temperature WDL    Skin Circulation/Temperature WDL X        Cardiac WDL    Cardiac WDL WDL        Peripheral/Neurovascular WDL    Peripheral Neurovascular WDL WDL        Cognitive/Neuro/Behavioral WDL    Cognitive/Neuro/Behavioral WDL WDL

## 2024-12-09 NOTE — ED PROVIDER NOTES
EMERGENCY DEPARTMENT ENCOUNTER      NAME: Phi Duncan  AGE: 28 year old male  YOB: 1995  MRN: [id number]  EVALUATION DATE & TIME: 12/9/2024  9:42 AM    PCP: Yomaira Faulkner    ED PROVIDER: Adrian Caballero PA-C       CHIEF COMPLAINT:  Chief Complaint   Patient presents with    Laceration         HPI  Phi Duncan is a pleasant right-hand-dominant 28 year old male who presents to the ER today for evaluation of right hand injury.  Onset just prior to arrival.  Patient was at work when he sustained a laceration to the thenar eminence of his right hand after cutting on a tin can.  Minimal pain.  No bleeding.  Tetanus up-to-date.      REVIEW OF SYSTEMS   Review of Systems  As above, otherwise ROS is unremarkable.      PAST MEDICAL HISTORY:  Past Medical History:   Diagnosis Date    Attention-deficit hyperactivity disorder     No Comments Provided    Major depressive disorder, single episode     No Comments Provided    Personal history of other medical treatment (CODE)     No Comments Provided    Sleep deprivation     No Comments Provided         PAST SURGICAL HISTORY:  Past Surgical History:   Procedure Laterality Date    EXTRACTION(S) DENTAL N/A     x8; then braces placed    EXTRACTION(S) DENTAL      wisdom teeth    LACERATION REPAIR N/A 2015    to upper lip; done in OR    OPEN REDUCTION INTERNAL FIXATION TOE(S) Left 6/18/2020    Procedure: left 2nd toe ORIF, plantar plate repair, and excision of fracture fragment;  Surgeon: Avinash Singer DPM;  Location:  OR         CURRENT MEDICATIONS:    Current Outpatient Medications   Medication Instructions    methylphenidate HCl ER (OSM) (CONCERTA) 36 mg, EVERY MORNING    pramipexole (MIRAPEX) 0.5 MG tablet          ALLERGIES:  No Known Allergies      FAMILY HISTORY:  Family History   Problem Relation Age of Onset    Diabetes Mother     Cancer Mother         Cancer,Unsure type- in pelvis    Alcoholism Father     No Known Problems Sister           SOCIAL HISTORY:   Social History     Socioeconomic History    Marital status: Single     Spouse name: None    Number of children: None    Years of education: None    Highest education level: None   Tobacco Use    Smoking status: Former     Current packs/day: 0.00     Average packs/day: 0.1 packs/day for 8.0 years (0.8 ttl pk-yrs)     Types: Cigarettes     Start date: 6/15/2013     Quit date: 6/15/2021     Years since quitting: 3.4    Smokeless tobacco: Never    Tobacco comments:     6 a day-Quit smoking: trying to quit   Vaping Use    Vaping status: Every Day    Substances: Nicotine, THC    Devices: Refillable tank   Substance and Sexual Activity    Alcohol use: Yes     Alcohol/week: 0.0 standard drinks of alcohol     Comment: occasionally     Drug use: No    Sexual activity: Yes     Partners: Female     Birth control/protection: Condom   Social History Narrative    Previously in residential care at [facility]. He reports he got there by stealing an Ipod.  Currently living in a foster home on [address].  He loves to fish.    Works at [employer]     Social Drivers of Health     Food Insecurity: Low Risk  (9/26/2024)    Food Insecurity     Within the past 12 months, did you worry that your food would run out before you got money to buy more?: No     Within the past 12 months, did the food you bought just not last and you didn t have money to get more?: No   Interpersonal Safety: Low Risk  (9/26/2024)    Interpersonal Safety     Do you feel physically and emotionally safe where you currently live?: Yes     Within the past 12 months, have you been hit, slapped, kicked or otherwise physically hurt by someone?: No     Within the past 12 months, have you been humiliated or emotionally abused in other ways by your partner or ex-partner?: No       ==================================================================================================================================    PHYSICAL EXAM    VITAL SIGNS: BP  "(!) 143/87   Pulse 103   Temp 97  F (36.1  C)   Resp 16   Ht 1.664 m (5' 5.5\")   Wt 80.7 kg (178 lb)   SpO2 94%   BMI 29.17 kg/m      Patient Vitals for the past 24 hrs:   BP Temp Pulse Resp SpO2 Height Weight   12/09/24 0909 (!) 143/87 97  F (36.1  C) 103 16 94 % 1.664 m (5' 5.5\") 80.7 kg (178 lb)       Physical Exam  Vitals and nursing note reviewed.   Constitutional:       General: Active.   HENT:      Nose: Nose normal.      Mouth/Throat:      Mouth: Mucous membranes are moist.      Pharynx: Oropharynx is clear.   Eyes:      Conjunctiva/sclera: Conjunctivae normal.   Musculoskeletal:      Comments: Patient with a 2.5 cm oblique laceration over the right thenar eminence.  No signs of retained foreign body.  No surrounding erythema warmth suggest infection.  No bony, tendon, nerve, or vascular injury.   strength 5/5.  NVI  Skin:     General: Skin is warm and dry.   Neurological:      General: No focal deficit present.      Mental Status: Alert and oriented for age.   Psychiatric:         Mood and Affect: Mood normal.     ==================================================================================================================================    LABS & RADIOLOGY:  All pertinent labs reviewed and interpreted. Reviewed all pertinent imaging. Please see official radiology report.     No orders to display           PROCEDURES:   INFORMED CONSENT  Discussed the risks, benefits, alternatives, and the necessity of other members of the [facility] team participating in the procedure. All questions answered and informed consent obtained.    UNIVERSAL PROTOCOL  Procedural pause conducted to verify: Correct patient identity, procedure to be performed, and as applicable, correct side and site, correct patient position, and availability of implants, special equipment, or special requirements.    [facility]    -Laceration Repair    Date/Time: 12/9/2024 11:46 AM    Performed by: Federico, " "Adrian Da Silva PA-C  Authorized by: Adrian Caballero PA-C    Risks, benefits and alternatives discussed.      ANESTHESIA (see MAR for exact dosages):     Anesthesia method:  Topical application    Topical anesthetic:  LET  LACERATION DETAILS     Location:  Hand    Hand location:  R palm    Length (cm):  2.5    REPAIR TYPE:     Repair type:  Simple    EXPLORATION:     Hemostasis achieved with:  LET    Wound exploration: wound explored through full range of motion and entire depth of wound probed and visualized      Wound extent: no foreign body, no signs of injury, no nerve damage, no tendon damage, no underlying fracture and no vascular damage      Contaminated: no      TREATMENT:     Area cleansed with:  Hibiclens    Amount of cleaning:  Standard    Irrigation solution:  Sterile saline    Irrigation volume:  500cc    Irrigation method:  Pressure wash    Visualized foreign bodies/material removed: no      SKIN REPAIR     Repair method:  Sutures    Suture size:  5-0    Suture material:  Nylon    Suture technique:  Simple interrupted    Number of sutures:  3    APPROXIMATION     Approximation:  Close    POST-PROCEDURE DETAILS     Dressing: Xeroform gauze, 2 x 2 dressing, Kerlix, Ace wrap.      PROCEDURE    Patient Tolerance:  Patient tolerated the procedure well with no immediate complications      ==================================================================================================================================    ED COURSE, MEDICAL DECISION MAKING, FINAL IMPRESSION AND PLAN:     Assessment / Plan:  1. Laceration of right hand without foreign body, initial encounter    2. Work related injury        The patient was interviewed and examined.  HPI and physical exam as below.  Differential diagnosis and MDM Key Documentation Elements as below.  Vitals, triage note, and nursing notes were reviewed.  BP (!) 143/87   Pulse 103   Temp 97  F (36.1  C)   Resp 16   Ht 1.664 m (5' 5.5\")   Wt 80.7 " "kg (178 lb)   SpO2 94%   BMI 29.17 kg/m      Differential includes but is not limited to laceration, retained foreign body, tendon injury    1.  Right palm laceration over the thenar eminence  2.  Workplace injury  -Wound was irrigated and closed using three 5-0 nylon sutures.  Dressing applied.  Workmen's Comp. form given.  Tetanus up-to-date.  Wound care and signs/as infection discussed.  Return to the ER for any new or worsening symptoms.    Pertinent Labs & Imaging studies reviewed. (See chart for details)     No results found for: \"ABORH\"      Reassessments, Medications, Interventions, & Response to Treatments:  -as above    Medications given during today's ER visit:  Medications   bacitracin ointment 1 g (1 g Topical $Given 12/9/24 1024)   Lido-Racepinephrine-Tetracaine (LET) topical gel GEL ( Topical $Given 12/9/24 1024)       Consultations:  None    Decision Rules, Medical Calculators, and Risk Stratification Tools:  None    MDM Key Documentation Elements for Patient's Evaluation:  Differential diagnosis to include high risk considerations: As above  Escalation to admission/observation considered: Admission/observation considered, but patient does not meet admission criteria  Discussions and management with other clinicians:    3a. Independent interpretation of testing performed by another health professional:  -No  3b. Discussion of management or test interpretation with another health professional: -No  Independent interpretation of tests:  Ordering and/or review of 0 test(s)  Discussion of test interpretations with radiology:  No  History obtained from source other than patient or assessment requiring an independent historian:  No  Review of non-ED/external records:  review of 1 records  Diagnostic tests considered but not ultimately performed/deferred:  -X-ray right hand  Prescription medications considered but not prescribed:  -Antibiotics  Chronic conditions affecting care:  -None  Care affected by " social determinants of health:  -None    The patient's management involved:   - Decision regarding minor procedures (laceration repair)    A shared decision making model was used. Time was taken to answer all questions.  Patient and/or associated parties understood and were agreeable to treatment plan.  Plan and all results were discussed. Warning signs and close return precautions to return to the ED given. Copy of results given. Discharged in stable condition. Discharged with discharge instructions outlining plan for further care and follow up.      New prescriptions started at today's ER visit  Discharge Medication List as of 12/9/2024 11:47 AM          ==================================================================================================================================      IKen PA-C, personally performed the services described in this documentation, and it is both accurate and complete.       Adrian Caballero PA-C  12/09/24 0901

## 2024-12-09 NOTE — DISCHARGE INSTRUCTIONS
- Keep wound clean. You may shower normally as long as you keep dressing dry.   -After 48 hours, removed sterile dressing.  Gently wash with soap and water, apply antibiotic ointment like petroleum jelly/bacitracin, and then apply new dressing twice a day.    - Return to the ED for any signs of infection to include increasing redness, increasing swelling, increasing pain, discharge, fever, or any other new or worsening symptoms.   - Follow up with your provider in 10-14 days for suture removal.  Return to the ED if new or worsening symptoms.  -You were given three sutures today.  -Report of workability Workmen's Comp. form given.

## 2024-12-17 ENCOUNTER — HOSPITAL ENCOUNTER (EMERGENCY)
Facility: OTHER | Age: 29
Discharge: HOME | End: 2024-12-17
Attending: FAMILY MEDICINE
Payer: MEDICARE

## 2024-12-17 VITALS
RESPIRATION RATE: 18 BRPM | BODY MASS INDEX: 28.12 KG/M2 | SYSTOLIC BLOOD PRESSURE: 126 MMHG | OXYGEN SATURATION: 97 % | TEMPERATURE: 97.4 F | WEIGHT: 175 LBS | HEART RATE: 63 BPM | HEIGHT: 66 IN | DIASTOLIC BLOOD PRESSURE: 86 MMHG

## 2024-12-17 DIAGNOSIS — J10.1 INFLUENZA A: ICD-10-CM

## 2024-12-17 LAB
FLUAV RNA SPEC QL NAA+PROBE: POSITIVE
FLUBV RNA RESP QL NAA+PROBE: NEGATIVE
RSV RNA SPEC NAA+PROBE: NEGATIVE
S PYO DNA THROAT QL NAA+PROBE: NOT DETECTED
SARS-COV-2 RNA RESP QL NAA+PROBE: NEGATIVE

## 2024-12-17 PROCEDURE — 99283 EMERGENCY DEPT VISIT LOW MDM: CPT | Performed by: FAMILY MEDICINE

## 2024-12-17 PROCEDURE — 87637 SARSCOV2&INF A&B&RSV AMP PRB: CPT | Performed by: FAMILY MEDICINE

## 2024-12-17 PROCEDURE — 87651 STREP A DNA AMP PROBE: CPT | Performed by: FAMILY MEDICINE

## 2024-12-17 PROCEDURE — 250N000013 HC RX MED GY IP 250 OP 250 PS 637: Performed by: FAMILY MEDICINE

## 2024-12-17 PROCEDURE — 250N000009 HC RX 250: Performed by: FAMILY MEDICINE

## 2024-12-17 PROCEDURE — 87798 DETECT AGENT NOS DNA AMP: CPT | Performed by: FAMILY MEDICINE

## 2024-12-17 RX ORDER — GINSENG 100 MG
500 CAPSULE ORAL ONCE
Status: COMPLETED | OUTPATIENT
Start: 2024-12-17 | End: 2024-12-17

## 2024-12-17 RX ORDER — ACETAMINOPHEN 500 MG
1000 TABLET ORAL ONCE
Status: COMPLETED | OUTPATIENT
Start: 2024-12-17 | End: 2024-12-17

## 2024-12-17 RX ADMIN — IBUPROFEN 600 MG: 200 TABLET, FILM COATED ORAL at 08:24

## 2024-12-17 RX ADMIN — BACITRACIN 1 G: 500 OINTMENT TOPICAL at 08:55

## 2024-12-17 RX ADMIN — ACETAMINOPHEN 1000 MG: 500 TABLET, FILM COATED ORAL at 08:23

## 2024-12-17 ASSESSMENT — ENCOUNTER SYMPTOMS
RHINORRHEA: 1
FEVER: 1
SORE THROAT: 1
COUGH: 1

## 2024-12-17 NOTE — DISCHARGE INSTRUCTIONS
Phi    I recommend Tylenol and ibuprofen.     Thank you for choosing our Emergency Department for your care.     You may receive a phone call or letter for a survey about your care in our ED.  Please complete this as this is how we improve care for our patients.     If you have any questions after leaving the ED you can call or text me on my cell phone at [phone].  I am not on call so if I do not answer my phone please leave a message- I will get back to you.  If you are not doing well please return to the ED.     Sincerely,    Dr Elian Fernandes M.D.  Medical Director  [facility] Emergency Department

## 2024-12-17 NOTE — ED PROVIDER NOTES
History     Chief Complaint   Patient presents with    Pharyngitis    Nasal Congestion     The history is provided by the patient.     Phi Duncan is a 28 year old male here with fever, runny nose and sore throat. He has been sick for about four days now. Some cough started yesterday. He works at [employer] and there is influenza A at the facility.     Allergies:  No Known Allergies    Problem List:    Patient Active Problem List    Diagnosis Date Noted    Closed fracture of phalanx of left second toe with routine healing, subsequent encounter 06/11/2020     Priority: Medium     Added automatically from request for surgery [id number]      Fetal alcohol syndrome 05/16/2019     Priority: Medium    Abdominal pain, left upper quadrant 12/08/2018     Priority: Medium    ADHD 02/01/2018     Priority: Medium     Overview:   also FAES (per patient)      Gastroesophageal reflux disease 08/23/2017     Priority: Medium    Other nonallopathic lesion of cervical region 08/17/2012     Priority: Medium    Nonallopathic lesion of thoracic region 08/17/2012     Priority: Medium    Constipation 07/14/2011     Priority: Medium    Viral warts 04/29/2011     Priority: Medium        Past Medical History:    Past Medical History:   Diagnosis Date    Attention-deficit hyperactivity disorder     Major depressive disorder, single episode     Personal history of other medical treatment (CODE)     Sleep deprivation        Past Surgical History:    Past Surgical History:   Procedure Laterality Date    EXTRACTION(S) DENTAL N/A     x8; then braces placed    EXTRACTION(S) DENTAL      wisdom teeth    LACERATION REPAIR N/A 2015    to upper lip; done in OR    OPEN REDUCTION INTERNAL FIXATION TOE(S) Left 6/18/2020    Procedure: left 2nd toe ORIF, plantar plate repair, and excision of fracture fragment;  Surgeon: Avinash Singer DPM;  Location:  OR       Family History:    Family History   Problem Relation Age of Onset    Diabetes Mother      "Cancer Mother         Cancer,Unsure type- in pelvis    Alcoholism Father     No Known Problems Sister        Social History:  Marital Status:  Single [1]  Social History     Tobacco Use    Smoking status: Former     Current packs/day: 0.00     Average packs/day: 0.1 packs/day for 8.0 years (0.8 ttl pk-yrs)     Types: Cigarettes     Start date: 6/15/2013     Quit date: 6/15/2021     Years since quitting: 3.5    Smokeless tobacco: Never    Tobacco comments:     6 a day-Quit smoking: trying to quit   Vaping Use    Vaping status: Every Day    Substances: Nicotine, THC    Devices: [employer] Take the Interview   Substance Use Topics    Alcohol use: Yes     Alcohol/week: 0.0 standard drinks of alcohol     Comment: occasionally     Drug use: No        Medications:    methylphenidate HCl ER, OSM, (CONCERTA) 36 MG CR tablet  pramipexole (MIRAPEX) 0.5 MG tablet          Review of Systems   Constitutional:  Positive for fever.   HENT:  Positive for rhinorrhea and sore throat.    Respiratory:  Positive for cough.    All other systems reviewed and are negative.      Physical Exam   BP: 126/86  Pulse: 63  Temp: 97.4  F (36.3  C)  Resp: 18  Height: 167.6 cm (5' 6\")  Weight: 79.4 kg (175 lb)  SpO2: 97 %      Physical Exam  Vitals and nursing note reviewed.   Constitutional:       General: He is not in acute distress.     Appearance: He is ill-appearing.   HENT:      Right Ear: Tympanic membrane and ear canal normal. No tenderness. No middle ear effusion.      Left Ear: Tympanic membrane and ear canal normal. No tenderness.  No middle ear effusion.      Mouth/Throat:      Mouth: Mucous membranes are moist.      Pharynx: No pharyngeal swelling or posterior oropharyngeal erythema.   Cardiovascular:      Rate and Rhythm: Normal rate and regular rhythm.      Heart sounds: Normal heart sounds.   Pulmonary:      Effort: Pulmonary effort is normal.      Breath sounds: Normal breath sounds.   Skin:     General: Skin is warm and dry.   Neurological:      " Mental Status: He is alert.         Results for orders placed or performed during the hospital encounter of 12/17/24 (from the past 24 hours)   Influenza A/B, RSV and SARS-CoV2 PCR (COVID-19) Nose    Specimen: Nose; Swab   Result Value Ref Range    Influenza A PCR Positive (A) Negative    Influenza B PCR Negative Negative    RSV PCR Negative Negative    SARS CoV2 PCR Negative Negative    Narrative    Testing was performed using the Xpert Xpress CoV2/Flu/RSV Assay on the [employer] Instrument. This test should be ordered for the detection of SARS-CoV2, influenza, and RSV viruses in individuals with signs and symptoms of respiratory tract infection. This test is for in vitro diagnostic use under the [place] FDA for laboratories certified under CLIA to perform high or moderate complexity testing. This test has been [place] FDA cleared. A negative result does not rule out the presence of PCR inhibitors in the specimen or target RNA in concentration below the limit of detection for the assay. If only one viral target is positive but coinfection with multiple targets is suspected, the sample should be re-tested with another FDA cleared, approved, or authorized test, if coninfection would change clinical management. This test was validated by the [facility] and [facility]. These laboratories are certified under the Clinical Laboratory Improvement Amendments of 1988 (CLIA-88) as qualified to perfom high complexity laboratory testing.   Group A Streptococcus PCR Throat Swab    Specimen: Throat; Swab   Result Value Ref Range    Group A strep by PCR Not Detected Not Detected    Narrative    The Xpert Xpress Strep A test, performed on the [employer], is a rapid, qualitative in vitro diagnostic test for the detection of Streptococcus pyogenes (Group A ß-hemolytic Streptococcus, Strep A) in throat swab specimens from patients with signs and symptoms of pharyngitis. The Xpert Xpress Strep A test can be used as  "an aid in the diagnosis of Group A Streptococcal pharyngitis. The assay is not intended to monitor treatment for Group A Streptococcus infections. The Xpert Xpress Strep A test utilizes an automated real-time polymerase chain reaction (PCR) to detect Streptococcus pyogenes DNA.       Medications   ibuprofen (ADVIL/MOTRIN) tablet 600 mg (600 mg Oral $Given 12/17/24 0824)   acetaminophen (TYLENOL) tablet 1,000 mg (1,000 mg Oral $Given 12/17/24 0823)   bacitracin ointment 1 g (1 g Topical $Given 12/17/24 0855)       Assessments & Plan (with Medical Decision Making)  Phi Duncan is a 28 year old male here with fever, runny nose and sore throat. He has been sick for about four days now. Some cough started yesterday. He works at [employer] and there is influenza A at the facility.   VS in the ED /86   Pulse 63   Temp 97.4  F (36.3  C) (Tympanic)   Resp 18   Ht 1.676 m (5' 6\")   Wt 79.4 kg (175 lb)   SpO2 97%   BMI 28.25 kg/m    Exam shows no focal findings.  We gave Tylenol and ibuprofen.   Labs show 4 Plex positive for influenza A.  8:59 AM  Positive for Influenza A.  Home with a work note.      I have reviewed the nursing notes.    I have reviewed the findings, diagnosis, plan and need for follow up with the patient.  Medical Decision Making  The patient's presentation was of moderate complexity (an acute illness with systemic symptoms).    The patient's evaluation involved:  an assessment requiring an independent historian (see separate area of note for details)  ordering and/or review of 1 test(s) in this encounter (see separate area of note for details)    The patient's management necessitated only low risk treatment.        Discharge Medication List as of 12/17/2024  9:00 AM          Final diagnoses:   Influenza A       12/17/2024   [facility] AND [facility], [address] MD  12/17/24 1058    "

## 2024-12-17 NOTE — ED TRIAGE NOTES
"Pt presents for concern of about 4 days of nasal congestion, and today has developed a sore throat, has also noted sweats/chills. Exposed to influenza A.     /86   Pulse 63   Temp 97.4  F (36.3  C) (Tympanic)   Resp 18   Ht 1.676 m (5' 6\")   Wt 79.4 kg (175 lb)   SpO2 97%   BMI 28.25 kg/m         Triage Assessment (Adult)       Row Name 12/17/24 0803          Triage Assessment    Airway WDL WDL        Respiratory WDL    Respiratory WDL WDL        Skin Circulation/Temperature WDL    Skin Circulation/Temperature WDL WDL        Cardiac WDL    Cardiac WDL WDL        Peripheral/Neurovascular WDL    Peripheral Neurovascular WDL WDL        Cognitive/Neuro/Behavioral WDL    Cognitive/Neuro/Behavioral WDL WDL                     "

## 2024-12-17 NOTE — LETTER
December 17, 2024      To Whom It May Concern:      Phi Duncna was seen in our Emergency Department today, 12/17/24.  I expect his condition to improve over the next few days.  He may return to work per protocol for patients with influenza.       Sincerely,              Kyle Fernandes MD

## 2024-12-18 LAB
B PARAPERT DNA SPEC QL NAA+PROBE: NOT DETECTED
B PERT DNA SPEC QL NAA+PROBE: NOT DETECTED

## 2024-12-19 ENCOUNTER — HOSPITAL ENCOUNTER (EMERGENCY)
Facility: OTHER | Age: 29
Discharge: HOME | End: 2024-12-19
Payer: MEDICARE

## 2024-12-19 VITALS
DIASTOLIC BLOOD PRESSURE: 88 MMHG | BODY MASS INDEX: 28.25 KG/M2 | TEMPERATURE: 98 F | SYSTOLIC BLOOD PRESSURE: 131 MMHG | HEART RATE: 82 BPM | WEIGHT: 175 LBS | OXYGEN SATURATION: 95 % | RESPIRATION RATE: 18 BRPM

## 2024-12-19 ASSESSMENT — COLUMBIA-SUICIDE SEVERITY RATING SCALE - C-SSRS
1. IN THE PAST MONTH, HAVE YOU WISHED YOU WERE DEAD OR WISHED YOU COULD GO TO SLEEP AND NOT WAKE UP?: NO
2. HAVE YOU ACTUALLY HAD ANY THOUGHTS OF KILLING YOURSELF IN THE PAST MONTH?: NO
6. HAVE YOU EVER DONE ANYTHING, STARTED TO DO ANYTHING, OR PREPARED TO DO ANYTHING TO END YOUR LIFE?: NO

## 2024-12-19 NOTE — ED TRIAGE NOTES
Patient coming in for suture removal. 3 stitches on right hand.      Triage Assessment (Adult)       Row Name 12/19/24 7643          Triage Assessment    Airway WDL WDL        Respiratory WDL    Respiratory WDL WDL        Skin Circulation/Temperature WDL    Skin Circulation/Temperature WDL WDL        Cardiac WDL    Cardiac WDL WDL        Peripheral/Neurovascular WDL    Peripheral Neurovascular WDL WDL        Cognitive/Neuro/Behavioral WDL    Cognitive/Neuro/Behavioral WDL WDL

## 2024-12-19 NOTE — ED TRIAGE NOTES
Patient : Sandip Mercado Age: 38 year old Sex: male   MRN: [id number] Encounter Date: 12/19/2024      History     Chief Complaint   Patient presents with    Neck Pain    Generalized Body Aches     38-year-old male presenting to the Emergency Department for a host of complaints. Patient endorses headache. States headache is right side of face, right forehead, bilateral temples, and crown of head. States only \"new thing\" is pain to crown of head. He thinks he has \"groove's sign.\" States he has \"floaters\" x months. Denies double vision, blurry vision. He also endorses \"tenderness and swelling to his veins near his temple and forehead.\" Patient \"feels like his face is drooping.\" He also endorses \"positional dizziness, confusion, and trouble speaking x months.\" Patient states his \"glands are swollen\" and his throat hurts. Patient concerned he has an infection going on. He endorses left arm pain that might be tennis elbow and bilateral lower extremity pain.  Patient has been seen multiple times for headache since April 2024 and has had 2 negative CT scans.  Patient was advised to follow-up with neurology and prescribed Flexeril and naproxen. States he is taking 1,000 mg naproxen daily with minimal relief. Patient states he was treated with antibiotics for a sinus infection in the past with minimal relief.     The history is provided by the patient.       No Known Allergies    Current Discharge Medication List        Prior to Admission Medications    Details   cyclobenzaprine (FLEXERIL) 10 MG tablet Take one tablet at bedtime for tension headaches  Qty: 30 tablet, Refills: 0           New Prescriptions    Details   fexofenadine (ALLEGRA) 180 MG tablet Take 1 tablet by mouth daily.  Qty: 30 tablet, Refills: 1      ketorolac (TORADOL) 10 MG tablet Take 1 tablet by mouth every 6 hours as needed for Pain.  Qty: 20 tablet, Refills: 0      acetaminophen (TYLENOL) 500 MG tablet Take 2 tablets by mouth every 6 hours as needed for  Pt reports reaching for  in kitchen when he came across a fish hook, which then punctured palm of L hand. This happened 45 minutes ago       Pain.  Qty: 100 tablet, Refills: 0             No past medical history on file.    No past surgical history on file.    Family History   Problem Relation Age of Onset    Rheumatoid Arthritis Mother     Cancer Maternal Grandmother     Cancer Paternal Grandmother        Social History     Tobacco Use    Smoking status: Former     Types: Cigarettes    Smokeless tobacco: Never   Vaping Use    Vaping status: Some Days    Substances: Nicotine   Substance Use Topics    Alcohol use: Not Currently    Drug use: Yes     Types: Marijuana       Review of Systems   Constitutional:  Negative for activity change, appetite change, chills and fever.   Respiratory:  Negative for cough and shortness of breath.    Cardiovascular:  Negative for chest pain.   Gastrointestinal:  Negative for abdominal pain, nausea and vomiting.   Musculoskeletal:  Positive for arthralgias.   Skin:  Negative for rash.   Neurological:  Positive for dizziness and headaches. Negative for syncope, weakness and numbness.       Physical Exam     ED Triage Vitals [12/19/24 1144]   ED Triage Vitals Group      Temp 97.7 °F (36.5 °C)      Heart Rate (!) 58      Resp 18      BP (!) 140/86      SpO2 100 %      EtCO2 mmHg       Height       Weight       Weight Scale Used       BMI (Calculated)       IBW/kg (Calculated)        Physical Exam  Vitals and nursing note reviewed.   Constitutional:       General: He is not in acute distress.     Appearance: He is not ill-appearing or toxic-appearing.   HENT:      Nose: Nose normal.      Mouth/Throat:      Mouth: Mucous membranes are moist.      Pharynx: Oropharynx is clear.      Neck: Normal range of motion. No rigidity or tenderness.   Eyes:      General: No visual field deficit.     Extraocular Movements: Extraocular movements intact.      Conjunctiva/sclera: Conjunctivae normal.      Pupils: Pupils are equal, round, and reactive to light.      Comments: No nystagmus   Cardiovascular:      Rate and Rhythm: Normal rate and  regular rhythm.   Pulmonary:      Effort: Pulmonary effort is normal.      Breath sounds: Normal breath sounds.   Abdominal:      General: Abdomen is flat.      Palpations: Abdomen is soft.      Tenderness: There is no abdominal tenderness.   Musculoskeletal:         General: Normal range of motion.      Comments: FROM of all extremities. Strength 5/5 upper and lower extremities. Sensation intact. 2+ radial pulses.    Lymphadenopathy:      Cervical: No cervical adenopathy.   Skin:     General: Skin is warm and dry.      Findings: No rash.   Neurological:      General: No focal deficit present.      Mental Status: He is alert and oriented to person, place, and time.      GCS: GCS eye subscore is 4. GCS verbal subscore is 5. GCS motor subscore is 6.      Cranial Nerves: Cranial nerves 2-12 are intact. No cranial nerve deficit, dysarthria or facial asymmetry.      Sensory: Sensation is intact.      Motor: Motor function is intact. No weakness or pronator drift.      Coordination: Coordination is intact. Finger-Nose-Finger Test and Heel to Shin Test normal.      Gait: Gait is intact.         ED Course     Procedures    Lab Results     Results for orders placed or performed during the hospital encounter of 12/19/24   Comprehensive Metabolic Panel    Specimen: Blood, Venous   Result Value Ref Range    Fasting Status      Sodium 144 135 - 145 mmol/L    Potassium 4.0 3.4 - 5.1 mmol/L    Chloride 109 97 - 110 mmol/L    Carbon Dioxide 26 21 - 32 mmol/L    Anion Gap 13 7 - 19 mmol/L    Glucose 94 70 - 99 mg/dL    BUN 10 6 - 20 mg/dL    Creatinine 0.80 0.67 - 1.17 mg/dL    Glomerular Filtration Rate >90 >=60    BUN/Cr 13 7 - 25    Calcium 8.9 8.4 - 10.2 mg/dL    Bilirubin, Total 0.4 0.2 - 1.0 mg/dL    GOT/AST 24 <=37 Units/L    GPT/ALT 59 <64 Units/L    Alkaline Phosphatase 87 45 - 117 Units/L    Albumin 4.1 3.4 - 5.0 g/dL    Protein, Total 7.0 6.4 - 8.2 g/dL    Globulin 2.9 2.0 - 4.0 g/dL    A/G Ratio 1.4 1.0 - 2.4   TROPONIN  I, HIGH SENSITIVITY    Specimen: Blood, Venous   Result Value Ref Range    Troponin I, High Sensitivity 6 <77 ng/L   CBC with Automated Differential (performable only)    Specimen: Blood, Venous   Result Value Ref Range    WBC 6.3 4.2 - 11.0 K/mcL    RBC 4.62 4.50 - 5.90 mil/mcL    HGB 14.6 13.0 - 17.0 g/dL    HCT 41.3 39.0 - 51.0 %    MCV 89.4 78.0 - 100.0 fl    MCH 31.6 26.0 - 34.0 pg    MCHC 35.4 32.0 - 36.5 g/dL    RDW-CV 12.1 11.0 - 15.0 %    RDW-SD 39.3 39.0 - 50.0 fL     140 - 450 K/mcL    NRBC 0 <=0 /100 WBC    Neutrophil, Percent 67 %    Lymphocytes, Percent 24 %    Mono, Percent 7 %    Eosinophils, Percent 1 %    Basophils, Percent 0 %    Immature Granulocytes 1 %    Absolute Neutrophils 4.2 1.8 - 7.7 K/mcL    Absolute Lymphocytes 1.5 1.0 - 4.8 K/mcL    Absolute Monocytes 0.5 0.3 - 0.9 K/mcL    Absolute Eosinophils  0.1 0.0 - 0.5 K/mcL    Absolute Basophils 0.0 0.0 - 0.3 K/mcL    Absolute Immature Granulocytes 0.0 0.0 - 0.2 K/mcL   COVID/Flu/RSV panel    Specimen: Nasal, Mid-turbinate; Swab   Result Value Ref Range    Rapid SARS-COV-2 by PCR Not Detected Not Detected / Detected / Presumptive Positive / Inhibitors present    Influenza A by PCR Not Detected Not Detected    Influenza B by PCR Not Detected Not Detected    RSV BY PCR Not Detected Not Detected    Isolation Guidelines      Procedural Comment         EKG Results     EKG Interpretation  Rate: 58  Rhythm: sinus bradycardia   Abnormality: no    EKG tracing interpreted by ED physician    Radiology Results     Imaging Results              CT HEAD WO CONTRAST (Final result)  Result time 12/19/24 13:51:09      Final result                   Impression:      No acute intracranial abnormality.    Electronically Signed by: DONALDO ANDERSON M.D.   Signed on: 12/19/2024 1:51 PM   Workstation ID: [id number]               Narrative:    EXAMINATION: CT HEAD WO CONTRAST    HISTORY: 38 years Male headache, vision changes    TECHNIQUE: CT of the head was  performed without intravenous contrast.  Multiplanar reformatted images were provided and reviewed. mA and/or kVp  was adjusted for patient size.    COMPARISON: None available.    FINDINGS:    Ventricles and sulci are normal in size. No midline shift or hydrocephalus.  The basilar cisterns are patent.     No extra-axial collection.   Gray-white matter differentiation is  maintained, without CT evidence of acute, transcortical infarct. No acute  intracranial hemorrhage.    No acute orbital abnormality.   Paranasal sinuses and mastoid air cells are  clear.  No acute calvarial or soft tissue abnormality.                                         XR CHEST PA AND LATERAL 2 VIEWS (Final result)  Result time 12/19/24 12:54:43      Final result                   Impression:      No acute pulmonary process.    Electronically Signed by: RODNEY BENTON MD   Signed on: 12/19/2024 12:54 PM   Workstation ID: [id number]               Narrative:    EXAMINATION: XR CHEST PA AND LATERAL 2 VIEWS    HISTORY: Neck swelling    COMPARISON: No prior study is available for comparison.    FINDINGS:    The lungs are clear. There is no focal consolidation, pleural effusion or  pneumothorax. The cardiac silhouette is normal in size. The superior  mediastinal contours appear normal. No acute fracture is identified.                                      ED Medication Orders (From admission, onward)      Ordered Start     Status Ordering Provider    12/19/24 1500 12/19/24 1501  ketorolac (TORADOL) injection 15 mg  ONCE         Last MAR action: Given JARON MUIR    12/19/24 1453 12/19/24 1454  acetaminophen (TYLENOL) tablet 1,000 mg  ONCE         Last MAR action: Given JARON MUIR                   Medical Decision Making  37 y/o M presenting to ED for headache.     On initial evaluation, patient is well-appearing, and in no acute distress.  Vital signs are stable. Exam is significant for no focal deficits.      Multiple  differentials were considered. The patient was apprised of diagnostic and treatment options, including alternate modes of care in addition to risks and benefits, for this medical condition. Based on this discussion, the patient states understanding and agrees with this chosen diagnostic and treatment plan.     Labs were ordered including   -- CBC  --CMP  --Trop  --Quad  --monospot  --EBV      Imaging conducted included CT and chest xray.   ECG shows sinus bradycardia.      Patient's exam and workup is reassuring. Plan for discharge with outpatient management. Spoke to the patient regarding the need for outpatient follow up with PCP and neurology for further evaluation. Will try antihistamine to help with congestion, toradol, and tylenol.      On reevaluation, the patient is resting comfortably in bed. Vital signs have remained stable during the duration of their stay in the department. They are stable for discharge.       Amount and/or Complexity of Data Reviewed  Labs: ordered. Decision-making details documented in ED Course.  Radiology: ordered and independent interpretation performed. Decision-making details documented in ED Course.  ECG/medicine tests: ordered and independent interpretation performed. Decision-making details documented in ED Course.        Clinical Impression     ED Diagnosis   1. Migraine without status migrainosus, not intractable, unspecified migraine type        2. Body aches        3. Sore throat            Disposition        Discharge after Treatment 12/19/2024  3:07 PM  There is no comment       Natasha Nunez PA-C  12/19/24 1543

## 2024-12-23 ENCOUNTER — OFFICE VISIT (OUTPATIENT)
Dept: FAMILY MEDICINE | Facility: OTHER | Age: 29
End: 2024-12-23
Attending: NURSE PRACTITIONER
Payer: MEDICARE

## 2024-12-23 VITALS
HEIGHT: 66 IN | SYSTOLIC BLOOD PRESSURE: 120 MMHG | RESPIRATION RATE: 20 BRPM | TEMPERATURE: 98.2 F | BODY MASS INDEX: 27.32 KG/M2 | DIASTOLIC BLOOD PRESSURE: 70 MMHG | OXYGEN SATURATION: 96 % | WEIGHT: 170 LBS | HEART RATE: 69 BPM

## 2024-12-23 DIAGNOSIS — R09.81 NASAL CONGESTION: ICD-10-CM

## 2024-12-23 DIAGNOSIS — J10.1 INFLUENZA A: Primary | ICD-10-CM

## 2024-12-23 DIAGNOSIS — R19.7 DIARRHEA, UNSPECIFIED TYPE: ICD-10-CM

## 2024-12-23 DIAGNOSIS — R52 BODY ACHES: ICD-10-CM

## 2024-12-23 DIAGNOSIS — R50.9 FEVER, UNSPECIFIED FEVER CAUSE: ICD-10-CM

## 2024-12-23 PROCEDURE — G0463 HOSPITAL OUTPT CLINIC VISIT: HCPCS

## 2024-12-23 ASSESSMENT — ENCOUNTER SYMPTOMS
COUGH: 1
SINUS PAIN: 0
NAUSEA: 0
FEVER: 1
FATIGUE: 1
MUSCULOSKELETAL NEGATIVE: 1
VOMITING: 0
DIARRHEA: 1
SORE THROAT: 0
CARDIOVASCULAR NEGATIVE: 1
CHILLS: 1
SINUS PRESSURE: 0

## 2024-12-23 ASSESSMENT — PAIN SCALES - GENERAL: PAINLEVEL_OUTOF10: MODERATE PAIN (4)

## 2024-12-23 NOTE — PROGRESS NOTES
Phi Duncan  1995    ASSESSMENT/PLAN      1. Influenza A (Primary)  2. Nasal congestion  3. Fever, unspecified fever cause  4. Diarrhea, unspecified type  5. Body aches      -Symptoms are most consistent with sequela from influenza A.  -Recommended loperamide for diarrhea.  -Recommended Afrin for nasal congestion.  Side effects reviewed.  - Discussed with patient that symptoms and exam are consistent with viral illness.    - No clinical indications for antibiotic treatment at this time.  - Symptomatic treatment - Encouraged fluids, salt water gargles, honey, humidifier, saline nasal spray, lozenges, tea, soup, smoothies, popsicles, topical vapor rub, rest, etc   - May use over-the-counter Tylenol or ibuprofen PRN  - Follow up as needed for new or worsening symptoms        *Explanation of diagnosis, treatment options and risk and benefits of medications reviewed with patient. Patient agrees with plan of care.  *All questions were answered.    *Red flags symptoms were discussed and patient was advised when they should return for reevaluation or for prompt emergency evaluation.   *Patient was given verbal and written instructions on plan of care. Instructions were printed or are available on Mychart on electronic AVS.   *We discussed potential side effects of any prescribed or recommended therapies, as well as expectations for response to treatments.  *Patient discharged in stable condition    Vinh Fernandes, SISI, APRN, FNP-C  [facility]    SUBJECTIVE  CHIEF COMPLAINT/ REASON FOR VISIT  Patient presents with:  Fever  Generalized Body Aches  Vomiting  Diarrhea  Fatigue     HISTORY OF PRESENT ILLNESS  Phi Duncan is a pleasant 29 year old male presents to [facility] today with fever and bodyaches.  Patient has been dealing with influenza A for the past 6 days and symptoms have not improved.  He has been taking ibuprofen and Tylenol as needed for fever.  He reports worsening headache, body  "aches, and diarrhea.  He reports he is primarily here as he needs to note for work.    History provided by patient      I have reviewed the nursing notes.  I have reviewed allergies, medication list, problem list, and past medical history.    REVIEW OF SYSTEMS  Review of Systems   Constitutional:  Positive for chills, fatigue and fever.   HENT:  Positive for congestion. Negative for ear discharge, ear pain, sinus pressure, sinus pain and sore throat.    Respiratory:  Positive for cough.    Cardiovascular: Negative.    Gastrointestinal:  Positive for diarrhea. Negative for nausea and vomiting.   Genitourinary: Negative.    Musculoskeletal: Negative.    Skin: Negative.         VITAL SIGNS  Vitals:    12/23/24 1514   BP: 120/70   Pulse: 69   Resp: 20   Temp: 98.2  F (36.8  C)   TempSrc: Tympanic   SpO2: 96%   Weight: 77.1 kg (170 lb)   Height: 1.664 m (5' 5.5\")      Body mass index is 27.86 kg/m .      OBJECTIVE  PHYSICAL EXAM  Physical Exam  Vitals and nursing note reviewed.   Constitutional:       General: He is not in acute distress.     Appearance: Normal appearance. He is normal weight. He is not ill-appearing, toxic-appearing or diaphoretic.   HENT:      Head: Normocephalic and atraumatic.      Nose: Congestion present.   Eyes:      Conjunctiva/sclera: Conjunctivae normal.   Cardiovascular:      Rate and Rhythm: Normal rate and regular rhythm.      Pulses: Normal pulses.      Heart sounds: Normal heart sounds. No murmur heard.  Pulmonary:      Effort: Pulmonary effort is normal. No respiratory distress.      Breath sounds: Normal breath sounds. No wheezing or rhonchi.   Abdominal:      General: Abdomen is flat. Bowel sounds are normal.      Palpations: Abdomen is soft.   Neurological:      Mental Status: He is alert.            DIAGNOSTICS  No results found for any visits on 12/23/24.   "

## 2024-12-23 NOTE — NURSING NOTE
"Chief Complaint   Patient presents with    Fever    Generalized Body Aches    Vomiting    Diarrhea    Fatigue     Patient here for  fever, body aches, vomiting, diarrhea, and fatigue. He was influenza A positive 6 days ago. Mendoza not feel like he is getting better.     Initial There were no vitals taken for this visit. Estimated body mass index is 28.25 kg/m  as calculated from the following:    Height as of 12/17/24: 1.676 m (5' 6\").    Weight as of 12/19/24: 79.4 kg (175 lb).  Medication Review: complete    The next two questions are to help us understand your food security.  If you are feeling you need any assistance in this area, we have resources available to support you today.          12/23/2024   SDOH- Food Insecurity   Within the past 12 months, did you worry that your food would run out before you got money to buy more? N   Within the past 12 months, did the food you bought just not last and you didn t have money to get more? N         Health Care Directive:  Patient does not have a Health Care Directive: Discussed advance care planning with patient; however, patient declined at this time.    Romi Rich LPN      "

## 2024-12-23 NOTE — LETTER
December 23, 2024      Phi Duncan  [address] [address]        To Whom It May Concern:    Phi Duncan  was seen on 12/23/24.  Please excuse him from work due to influenza.         Sincerely,        Vinh Fernandes, DNP, APRN, FNP-C

## 2025-01-14 ENCOUNTER — HOSPITAL ENCOUNTER (OUTPATIENT)
Dept: GENERAL RADIOLOGY | Facility: OTHER | Age: 30
Discharge: HOME | End: 2025-01-14
Attending: FAMILY MEDICINE
Payer: MEDICARE

## 2025-01-14 ENCOUNTER — OFFICE VISIT (OUTPATIENT)
Dept: FAMILY MEDICINE | Facility: OTHER | Age: 30
End: 2025-01-14
Attending: FAMILY MEDICINE
Payer: MEDICARE

## 2025-01-14 VITALS
OXYGEN SATURATION: 96 % | DIASTOLIC BLOOD PRESSURE: 64 MMHG | HEIGHT: 66 IN | WEIGHT: 177 LBS | TEMPERATURE: 98.6 F | RESPIRATION RATE: 16 BRPM | SYSTOLIC BLOOD PRESSURE: 114 MMHG | BODY MASS INDEX: 28.45 KG/M2 | HEART RATE: 72 BPM

## 2025-01-14 DIAGNOSIS — M25.562 CHRONIC PAIN OF LEFT KNEE: ICD-10-CM

## 2025-01-14 DIAGNOSIS — A63.0 CONDYLOMATA ACUMINATA IN MALE: Primary | ICD-10-CM

## 2025-01-14 DIAGNOSIS — G89.29 CHRONIC PAIN OF LEFT KNEE: ICD-10-CM

## 2025-01-14 PROCEDURE — 73564 X-RAY EXAM KNEE 4 OR MORE: CPT | Mod: LT

## 2025-01-14 PROCEDURE — G0463 HOSPITAL OUTPT CLINIC VISIT: HCPCS

## 2025-01-14 PROCEDURE — 73560 X-RAY EXAM OF KNEE 1 OR 2: CPT | Mod: RT

## 2025-01-14 ASSESSMENT — PAIN SCALES - GENERAL: PAINLEVEL_OUTOF10: MILD PAIN (2)

## 2025-01-14 NOTE — NURSING NOTE
"Chief Complaint   Patient presents with    Knee Pain       Initial /64   Pulse 72   Temp 98.6  F (37  C) (Tympanic)   Resp 16   Ht 1.664 m (5' 5.5\")   Wt 80.3 kg (177 lb)   SpO2 96%   BMI 29.01 kg/m   Estimated body mass index is 29.01 kg/m  as calculated from the following:    Height as of this encounter: 1.664 m (5' 5.5\").    Weight as of this encounter: 80.3 kg (177 lb).  Medication Review: complete    The next two questions are to help us understand your food security.  If you are feeling you need any assistance in this area, we have resources available to support you today.          12/23/2024   SDOH- Food Insecurity   Within the past 12 months, did you worry that your food would run out before you got money to buy more? N   Within the past 12 months, did the food you bought just not last and you didn t have money to get more? N         Health Care Directive:  Patient does not have a Health Care Directive: Discussed advance care planning with patient; however, patient declined at this time.    Shaye Yoon LPN    Patient declines questionnaires today.       "

## 2025-01-14 NOTE — PROGRESS NOTES
"  Assessment & Plan     1. Condylomata acuminata in male (Primary)  Chronic, never followed through with previous Dermatology referral.  Would like this placed again.  - Adult Dermatology  Referral; Future    2. Chronic pain of left knee  Chronic, intermittent but in general worsening over years.  XR today is reassuring.  Has gone to chiropractic within the last 6 weeks; and was more regular with appointments ~6 months ago.  Has also used conservative measures: tylenol, NSAID, ice, stretching - all without relief.  Would like to evaluate fat pads, ligaments and other non bony structures with MR imaging.  This will be ordered.  Consideration for steroid injection vs referral to SportsMed/Ortho pending MR results.  - XR Knee Standing 2v  Bilateral & 2v Left; Future      MDM:  Ordering of each unique test    BMI  Estimated body mass index is 27.86 kg/m  as calculated from the following:    Height as of 12/23/24: 1.664 m (5' 5.5\").    Weight as of 12/23/24: 77.1 kg (170 lb).   Weight management plan: Discussed healthy diet and exercise guidelines    Follow up: pending MR results      Miryam Yip is a 29 year old, presenting for the following health issues:  No chief complaint on file.        1/14/2025     3:27 PM   Additional Questions   Roomed by [facility] Cr   Accompanied by self     HPI     L knee pain that has been going on for years; but progressively becoming more frequent and more severe.  At random times (not associated with position, exertion, time of day, etc) - will feel like someone is pinching a nerve in the anterior mid distal thigh/knee area with a vice .  No color change, knee swelling.  No known injury/falls/trauma.  Has used tylenol, ibuprofen prn and at more scheduled intervals without benefit.  Chiro care - was more consistent ~6 months ago; but has gone within the last 6 weeks.  No benefit.  When it comes on: 10/10 pain and can drop him to the ground.  Subsides on it's own over " 15-20 minutes.  Can be achy at times after this.  Never locking up or giving out otherwise.        Objective    There were no vitals taken for this visit.  There is no height or weight on file to calculate BMI.  Physical Exam   GENERAL: alert and no distress  ORTHO: Knee Exam: Inspection: AP/lateral alignment normal, No effusion, No quad atrophy  Tender: NA  Active Range of Motion: full flexion, full extension  Strength: 5/5 throughout  Special tests: normal Valgus stress test, normal Varus, negative Lachman's test, negative pivot shift, negative posterior drawer, negative Zaira's , no apprehension with lateral stress of the patella  SKIN: no suspicious lesions or rashes    No results found for any visits on 01/14/25.        Signed Electronically by: Yomaira Faulkner DO

## 2025-01-21 ENCOUNTER — HOSPITAL ENCOUNTER (OUTPATIENT)
Dept: MRI IMAGING | Facility: OTHER | Age: 30
Discharge: HOME | End: 2025-01-21
Attending: FAMILY MEDICINE
Payer: MEDICARE

## 2025-01-21 ENCOUNTER — TELEPHONE (OUTPATIENT)
Dept: FAMILY MEDICINE | Facility: OTHER | Age: 30
End: 2025-01-21
Payer: MEDICARE

## 2025-01-21 DIAGNOSIS — F41.9 ANXIETY: ICD-10-CM

## 2025-01-21 DIAGNOSIS — G89.29 CHRONIC PAIN OF LEFT KNEE: ICD-10-CM

## 2025-01-21 DIAGNOSIS — M25.562 CHRONIC PAIN OF LEFT KNEE: ICD-10-CM

## 2025-01-21 DIAGNOSIS — F40.240 CLAUSTROPHOBIA: Primary | ICD-10-CM

## 2025-01-21 PROCEDURE — 73721 MRI JNT OF LWR EXTRE W/O DYE: CPT | Mod: LT

## 2025-01-21 RX ORDER — ALPRAZOLAM 0.5 MG
0.5 TABLET ORAL
Qty: 2 TABLET | Refills: 0 | Status: SHIPPED | OUTPATIENT
Start: 2025-01-21

## 2025-01-21 NOTE — TELEPHONE ENCOUNTER
Reason for call: Request a MRI order.    Order requested for? Right leg - MRI    Who is your PCP? SMS    Preferred method for responding to this message: Telephone Call    Phone number patient can be reached at: Cell number on file:    Telephone Information:   Mobile [phone]       If we cannot reach you directly, may we leave a detailed response at the number you provided? Yes      The patient stated his right leg is acting up too and requests an MRI order for his right leg be added to the MRI appointment he has today.      Alejandra Alvarez on 1/21/2025 at 2:39 PM

## 2025-01-21 NOTE — TELEPHONE ENCOUNTER
Patient informed since this is a new issue with his opposite leg he will need to be seen. In addition, an MRI cannot be added on today as all imaging has to be authorized by insurance first. Understanding was voiced.     Celine Mann CMA on 1/21/2025 at 2:55 PM

## 2025-01-21 NOTE — TELEPHONE ENCOUNTER
I have prescribed 2 tablets of xanax after reviewing pdmp on a one-time basis to be taken prior to MRI for claustrophobia   Celine Beaulieu NP on 1/21/2025 at 2:54 PM

## 2025-01-21 NOTE — TELEPHONE ENCOUNTER
Reason for call: Medication or medication refill    Have you contacted your pharmacy regarding this refill? NO    Name of medication requested: claustrophobia - MRI today    How many days of medication do you have left? NEW    What pharmacy do you use? [facility]    Preferred method for responding to this message: Telephone Call    Phone number patient can be reached at: Cell number on file:    Telephone Information:   Mobile [phone]       If we cannot reach you directly, may we leave a detailed response at the number you provided? Yes    The patient requests medication for MRI today.        Alejandra Alvarez on 1/21/2025 at 2:36 PM

## 2025-02-17 ENCOUNTER — OFFICE VISIT (OUTPATIENT)
Dept: FAMILY MEDICINE | Facility: OTHER | Age: 30
End: 2025-02-17
Payer: MEDICARE

## 2025-02-17 VITALS
OXYGEN SATURATION: 96 % | RESPIRATION RATE: 16 BRPM | BODY MASS INDEX: 28.12 KG/M2 | HEART RATE: 81 BPM | WEIGHT: 175 LBS | DIASTOLIC BLOOD PRESSURE: 80 MMHG | SYSTOLIC BLOOD PRESSURE: 108 MMHG | HEIGHT: 66 IN | TEMPERATURE: 98.3 F

## 2025-02-17 DIAGNOSIS — J01.10 ACUTE NON-RECURRENT FRONTAL SINUSITIS: ICD-10-CM

## 2025-02-17 DIAGNOSIS — H92.03 OTALGIA OF BOTH EARS: ICD-10-CM

## 2025-02-17 DIAGNOSIS — R11.0 NAUSEA: ICD-10-CM

## 2025-02-17 DIAGNOSIS — R09.81 NASAL CONGESTION: Primary | ICD-10-CM

## 2025-02-17 PROCEDURE — 99213 OFFICE O/P EST LOW 20 MIN: CPT | Performed by: NURSE PRACTITIONER

## 2025-02-17 PROCEDURE — G0463 HOSPITAL OUTPT CLINIC VISIT: HCPCS

## 2025-02-17 RX ORDER — AZITHROMYCIN 250 MG/1
TABLET, FILM COATED ORAL
Qty: 6 TABLET | Refills: 0 | Status: SHIPPED | OUTPATIENT
Start: 2025-02-17 | End: 2025-02-22

## 2025-02-17 ASSESSMENT — ENCOUNTER SYMPTOMS
PSYCHIATRIC NEGATIVE: 1
NEUROLOGICAL NEGATIVE: 1
ENDOCRINE NEGATIVE: 1
CARDIOVASCULAR NEGATIVE: 1
APPETITE CHANGE: 1
RESPIRATORY NEGATIVE: 1
GASTROINTESTINAL NEGATIVE: 1
ACTIVITY CHANGE: 1
FEVER: 1
MUSCULOSKELETAL NEGATIVE: 1
SINUS PAIN: 1
FATIGUE: 1
SINUS PRESSURE: 1
EYES NEGATIVE: 1
HEMATOLOGIC/LYMPHATIC NEGATIVE: 1

## 2025-02-17 ASSESSMENT — PAIN SCALES - GENERAL: PAINLEVEL_OUTOF10: MODERATE PAIN (5)

## 2025-02-17 NOTE — NURSING NOTE
"Chief Complaint   Patient presents with    Ear Problem     Today - both ears     Patient not tx   Unsure if infection or not    Initial /80 (BP Location: Right arm, Patient Position: Sitting, Cuff Size: Adult Regular)   Pulse 81   Temp 98.3  F (36.8  C) (Temporal)   Resp 16   Ht 1.676 m (5' 6\")   Wt 79.4 kg (175 lb)   SpO2 96%   BMI 28.25 kg/m   Estimated body mass index is 28.25 kg/m  as calculated from the following:    Height as of this encounter: 1.676 m (5' 6\").    Weight as of this encounter: 79.4 kg (175 lb).     Advance Care Directive on file? n    FOOD SECURITY SCREENING QUESTIONS:    The next two questions are to help us understand your food security.  If you are feeling you need any assistance in this area, we have resources available to support you today.    Hunger Vital Signs:  Within the past 12 months we worried whether our food would run out before we got money to buy more. Never  Within the past 12 months the food we bought just didn't last and we didn't have money to get more. Never  Areli Bonds LPN,[facility] on 2/17/2025 at 10:12 AM      Areli Bonds LPN     "

## 2025-02-17 NOTE — LETTER
2025    Phi Duncan   1995        To Whom it May Concern;    Please excuse Phi Duncan from work/school for a healthcare visit on 2025.    Sincerely,        Lary Esparza, CNP

## 2025-02-17 NOTE — PROGRESS NOTES
Phi Duncan  1995    ASSESSMENT/PLAN    Presents to rapid clinic with ongoing sinus congestion, ear pain and pressure.  Patient was positive for RSV on 2/7/2025 and symptoms have been progressive.  Given length of time of symptoms we will treat with Zithromax.  Patient would like to avoid prednisone as he has significant acid reflux at baseline.  Patient would also like to avoid amoxicillin as he does get significant diarrhea from this medication.  Patient's vitals are stable and he appears nontoxic.        1. Nasal congestion (Primary)  2. Otalgia of both ears  3. Nausea  4. Acute non-recurrent frontal sinusitis    - azithromycin (ZITHROMAX) 250 MG tablet; Take 2 tablets (500 mg) by mouth daily for 1 day, THEN 1 tablet (250 mg) daily for 4 days.  Dispense: 6 tablet; Refill: 0  - Patient states he does get frequent diarrhea from Amoxicillin, would prefer to avoid this medication.  - Symptomatic treatment - Encouraged fluids, salt water gargles, honey, humidifier, saline nasal spray, lozenges, tea, soup, smoothies, popsicles, topical vapor rub, rest, etc   - May use over-the-counter Tylenol or ibuprofen PRN  - Follow up as needed for new or worsening symptoms        *A shared decision making model was used.   *Patient and/or associated parties understood and were agreeable to treatment plan.   *Plan and all results were discussed.   *Explanation of diagnosis, treatment options and risk and benefits of medications reviewed with patient.    *Time was taken to answer all questions.   *Red flags symptoms were discussed and patient was advised when they should return for reevaluation or for prompt emergency evaluation.   *Patient was given verbal and written instructions on plan of care. Instructions were printed or are available on [employer] [facility] Inchart on electronic AVS.   *We discussed potential side effects of any prescribed or recommended therapies, as well as expectations for response to treatments.  *Patient discharged  "in stable condition    Lary Esparza CNP  [facility]    SUBJECTIVE  CHIEF COMPLAINT/ REASON FOR VISIT  Patient presents with:  Ear Problem: Today - both ears     HISTORY OF PRESENT ILLNESS  Phi Duncan is a pleasant 29 year old male presents to rapid clinic today with ongoing sinus congestion, ear pain and pressure.  Patient was positive for RSV on 2/7/2025 and symptoms have been progressive.       I have reviewed the nursing notes.  I have reviewed allergies, medication list, problem list, and past medical history.    REVIEW OF SYSTEMS  Review of Systems   Constitutional:  Positive for activity change, appetite change, fatigue and fever.   HENT:  Positive for congestion, sinus pressure and sinus pain.    Eyes: Negative.    Respiratory: Negative.     Cardiovascular: Negative.    Gastrointestinal: Negative.    Endocrine: Negative.    Genitourinary: Negative.    Musculoskeletal: Negative.    Skin: Negative.    Neurological: Negative.    Hematological: Negative.    Psychiatric/Behavioral: Negative.     All other systems reviewed and are negative.       VITAL SIGNS  Vitals:    02/17/25 1008   BP: 108/80   BP Location: Right arm   Patient Position: Sitting   Cuff Size: Adult Regular   Pulse: 81   Resp: 16   Temp: 98.3  F (36.8  C)   TempSrc: Temporal   SpO2: 96%   Weight: 79.4 kg (175 lb)   Height: 1.676 m (5' 6\")      Body mass index is 28.25 kg/m .      OBJECTIVE  PHYSICAL EXAM  Physical Exam  Vitals and nursing note reviewed.   Constitutional:       Appearance: Normal appearance.   HENT:      Head: Normocephalic.      Right Ear: Tympanic membrane normal.      Left Ear: Tympanic membrane normal.      Nose: Congestion and rhinorrhea present.      Mouth/Throat:      Pharynx: Posterior oropharyngeal erythema present.   Eyes:      Pupils: Pupils are equal, round, and reactive to light.   Cardiovascular:      Rate and Rhythm: Normal rate and regular rhythm.      Pulses: Normal pulses.      Heart " sounds: Normal heart sounds.   Pulmonary:      Effort: Pulmonary effort is normal.      Breath sounds: Normal breath sounds.   Abdominal:      Palpations: Abdomen is soft.   Musculoskeletal:         General: Normal range of motion.      Cervical back: Normal range of motion.   Skin:     General: Skin is warm and dry.      Capillary Refill: Capillary refill takes less than 2 seconds.   Neurological:      General: No focal deficit present.      Mental Status: He is alert.

## 2025-02-22 ENCOUNTER — OFFICE VISIT (OUTPATIENT)
Dept: FAMILY MEDICINE | Facility: OTHER | Age: 30
End: 2025-02-22
Attending: STUDENT IN AN ORGANIZED HEALTH CARE EDUCATION/TRAINING PROGRAM
Payer: MEDICARE

## 2025-02-22 VITALS
TEMPERATURE: 98.2 F | BODY MASS INDEX: 28.73 KG/M2 | RESPIRATION RATE: 18 BRPM | HEART RATE: 70 BPM | WEIGHT: 178 LBS | DIASTOLIC BLOOD PRESSURE: 80 MMHG | SYSTOLIC BLOOD PRESSURE: 126 MMHG | OXYGEN SATURATION: 96 %

## 2025-02-22 DIAGNOSIS — R19.7 DIARRHEA OF PRESUMED INFECTIOUS ORIGIN: ICD-10-CM

## 2025-02-22 DIAGNOSIS — R11.2 NAUSEA AND VOMITING, UNSPECIFIED VOMITING TYPE: Primary | ICD-10-CM

## 2025-02-22 DIAGNOSIS — R51.9 ACUTE INTRACTABLE HEADACHE, UNSPECIFIED HEADACHE TYPE: ICD-10-CM

## 2025-02-22 DIAGNOSIS — R50.9 FEVER, UNSPECIFIED FEVER CAUSE: ICD-10-CM

## 2025-02-22 LAB
FLUAV RNA SPEC QL NAA+PROBE: NEGATIVE
FLUBV RNA RESP QL NAA+PROBE: NEGATIVE
RSV RNA SPEC NAA+PROBE: NEGATIVE
SARS-COV-2 RNA RESP QL NAA+PROBE: NEGATIVE

## 2025-02-22 PROCEDURE — 87637 SARSCOV2&INF A&B&RSV AMP PRB: CPT | Mod: ZL | Performed by: STUDENT IN AN ORGANIZED HEALTH CARE EDUCATION/TRAINING PROGRAM

## 2025-02-22 PROCEDURE — 99213 OFFICE O/P EST LOW 20 MIN: CPT | Performed by: STUDENT IN AN ORGANIZED HEALTH CARE EDUCATION/TRAINING PROGRAM

## 2025-02-22 PROCEDURE — G0463 HOSPITAL OUTPT CLINIC VISIT: HCPCS | Performed by: STUDENT IN AN ORGANIZED HEALTH CARE EDUCATION/TRAINING PROGRAM

## 2025-02-22 RX ORDER — ONDANSETRON 4 MG/1
4 TABLET, ORALLY DISINTEGRATING ORAL EVERY 8 HOURS PRN
Qty: 9 TABLET | Refills: 0 | Status: SHIPPED | OUTPATIENT
Start: 2025-02-22 | End: 2025-02-25

## 2025-02-22 ASSESSMENT — PAIN SCALES - GENERAL: PAINLEVEL_OUTOF10: NO PAIN (0)

## 2025-02-22 NOTE — NURSING NOTE
Patient is here for multiple concerns started today, symptoms are fatigue, diarrhea, vomiting, poor appetite, headache, sinus pressure, fever. States took tylenol and symptoms went away but still has headache     Faby Bowers LPN .............2/22/2025     12:49 PM

## 2025-02-22 NOTE — PATIENT INSTRUCTIONS
Vomiting and Diarrhea    Zofran for nausea.    Imodium for diarrhea.    Fluids. Rest.    Follow up in 3-5 days if not improving.  Return to rapid clinic/ER if worsening.     [facility]: [phone]

## 2025-02-22 NOTE — PROGRESS NOTES
Assessment & Plan     (R11.2) Nausea and vomiting, unspecified vomiting type  (primary encounter diagnosis)    Comment: Nausea vomiting and diarrhea.  Most likely this is related to either norovirus versus food poisoning.  His vitals are stable at this time.  Overall he appears well.  This just started this morning.  No concern at this time for dehydration.    Plan: Influenza A/B, RSV and SARS-CoV2 PCR (COVID-19)        Nose, ondansetron (ZOFRAN ODT) 4 MG ODT tab          Discussed using Zofran as needed for nausea.  Imodium for diarrhea.  If diarrhea persist longer than 3 to 5 days, recommended reevaluation/lab work for stool sample testing.  Continue to drink plenty of fluids.  Follow-up if not improving.  Return to rapid clinic or ER if symptoms worsen or change in the meantime.    (R19.7) Diarrhea of presumed infectious origin  Comment: Presumed infectious.  Plan: Influenza A/B, RSV and SARS-CoV2 PCR (COVID-19)        Nose            (R51.9) Acute intractable headache, unspecified headache type  Comment: Presumed from viral infection.  Plan: Influenza A/B, RSV and SARS-CoV2 PCR (COVID-19)        Nose            (R50.9) Fever, unspecified fever cause  Comment: Afebrile upon exam today.  No antipyretics this morning.  Plan: Influenza A/B, RSV and SARS-CoV2 PCR (COVID-19)        Nose                        No follow-ups on file.    Miryam Yip is a 29 year old, presenting for the following health issues:  URI and History of Present Illness (Vomiting, diarrhea, fatigue, sinus, headache, fever)    HPI     Patient presents today with concerns of vomiting and diarrhea.  He notes he did eat at [employer] last night, this morning he did have oatmeal and juice.  He notes that when he got to work today he did have an episode of vomiting along with diarrhea at the same time.  He notes at this time, he does feel slightly better.  No notable sore throat, some mild headache and fatigue.  He does note that he just  completed a course of azithromycin for a sinus infection and notes that those symptoms do feel better.      Review of Systems  Constitutional, HEENT, cardiovascular, pulmonary, gi and gu systems are negative, except as otherwise noted.        Objective    /80   Pulse 70   Temp 98.2  F (36.8  C) (Tympanic)   Resp 18   Wt 80.7 kg (178 lb)   SpO2 96%   BMI 28.73 kg/m    Body mass index is 28.73 kg/m .    Physical Exam   GENERAL: alert and no distress  EYES: Eyes grossly normal to inspection, PERRL and conjunctivae and sclerae normal  HENT: ear canals and TM's normal, nose and mouth without ulcers or lesions  NECK: no adenopathy, no asymmetry, masses, or scars  RESP: lungs clear to auscultation - no rales, rhonchi or wheezes  CV: regular rate and rhythm, normal S1 S2, no S3 or S4, no murmur, click or rub, no peripheral edema  ABDOMEN: soft, nontender, no hepatosplenomegaly, no masses and bowel sounds normal  MS: no gross musculoskeletal defects noted, no edema    Results for orders placed or performed in visit on 02/22/25   Influenza A/B, RSV and SARS-CoV2 PCR (COVID-19) Nose     Status: Normal    Specimen: Nose; Swab   Result Value Ref Range    Influenza A PCR Negative Negative    Influenza B PCR Negative Negative    RSV PCR Negative Negative    SARS CoV2 PCR Negative Negative    Narrative    Testing was performed using the Xpert Xpress CoV2/Flu/RSV Assay on the [employer] GeneXpert Instrument. This test should be ordered for the detection of SARS-CoV2, influenza, and RSV viruses in individuals with signs and symptoms of respiratory tract infection. This test is for in vitro diagnostic use under the [place] FDA for laboratories certified under CLIA to perform high or moderate complexity testing. This test has been [place] FDA cleared. A negative result does not rule out the presence of PCR inhibitors in the specimen or target RNA in concentration below the limit of detection for the assay. If only one viral target is  positive but coinfection with multiple targets is suspected, the sample should be re-tested with another FDA cleared, approved, or authorized test, if coninfection would change clinical management. This test was validated by the [facility]. These laboratories are certified under the Clinical Laboratory Improvement Amendments of 1988 (CLIA-88) as qualified to perfom high complexity laboratory testing.         Signed Electronically by: Nery Leon PA-C

## 2025-02-22 NOTE — LETTER
February 22, 2025      Phi Duncan  [address] [address]        To Whom It May Concern:    Phi Duncan  was seen on 2/22/25.  Please excuse him today and tomorrow due to illness.        Sincerely,        Nery Leon PA-C    Electronically signed

## 2025-03-31 ENCOUNTER — THERAPY VISIT (OUTPATIENT)
Dept: PHYSICAL THERAPY | Facility: OTHER | Age: 30
End: 2025-03-31
Attending: FAMILY MEDICINE
Payer: MEDICARE

## 2025-03-31 DIAGNOSIS — G89.29 CHRONIC PAIN OF LEFT KNEE: Primary | ICD-10-CM

## 2025-03-31 DIAGNOSIS — M25.862 IMPINGEMENT OF LEFT KNEE JOINT: ICD-10-CM

## 2025-03-31 DIAGNOSIS — M25.562 CHRONIC PAIN OF LEFT KNEE: Primary | ICD-10-CM

## 2025-03-31 NOTE — PROGRESS NOTES
PHYSICAL THERAPY EVALUATION  Type of Visit: Evaluation              Subjective         Presenting condition or subjective complaint:  L knee pain (rare)  Date of onset: 02/20/25 (date of referral)    Relevant medical history:   none  Dates & types of surgery:   had left toe knuckle removed a few years ago  Precautions:  none    Prior diagnostic imaging/testing results:     x-ray and MRI both negative  Prior therapy history for the same diagnosis, illness or injury:    none    Prior Level of Function: independent and active  Employment:    Dietary aide at [employer]    Patient goals for therapy:  Consult about why pain might be happening     Pain assessment:  0/10 on average  Makes it worse: randomly and not associated with movement. Can be standing, laying down, sitting and not moving at al   Makes it better: waiting 30 seconds       Objective   OBSERVATION: During squat demonstrates genu valgus bilaterally worse on the right significantly     SENSATION: intact to light touch    GAIT: nothing abnormal, slight toe out bilaterally    PALPATION: Tender at center of patellar tendon on the Left only only when knee is bent     JOINT MOBILITY: no pain reproduced and WNL    Knee ROM   AROM Comments   Right 5-0-140 No pain with overpressures either side   Left 5-0-140      FLEXIBILITY:   Alex Test: R = WNL, L = WNL    Knee Strength   Right Left Comments   Extension 5/5, strong 5/5, strong    Flexion 5/5, strong 5/5, strong      Hip Strength   Right Left Comments   Flexion 5/5, strong 5/5, strong    ABD 4/5, mod resist 4-/5, min resist      SPECIAL TESTS:    Left Right   Zaira's (Meniscus)     Patella Tracking     Ligamentous Stability     Anterior Drawer (ACL) Negative,   Negative,     Posterior Drawer (PCL) Negative,   Negative,     Valgus Stress Testing at 0 Deg and 30 Deg Negative,   Negative,     Varus Stress Testing at 0 Deg and 30 Deg  Negative,   Negative,       FUNCTIONAL TESTS:   Double Leg Squat:  valgus bilaterally right worse than left  SLS:  R = Fair, L = Fair, no pain    Assessment & Plan   CLINICAL IMPRESSIONS  Medical Diagnosis: Left knee pain and impingement    Treatment Diagnosis: none   Impression/Assessment: Patient is a 29 year old male presenting with referring diagnosis of left knee pain.  The following significant findings have been identified: no impairments identified. These impairments interfere with their ability to perform stand at times when pain is happening as compared to previous level of function. Pain goes away after about 30 seconds and happens roughly 1-2 times per week    Clinical Decision Making (Complexity):  Clinical Presentation: Stable/Uncomplicated  Clinical Presentation Rationale: based on medical and personal factors listed in PT evaluation  Clinical Decision Making (Complexity): Low complexity    PLAN OF CARE  Treatment Interventions:  Modalities: Cryotherapy, Cupping, Dry Needling, E-stim, Hot Pack, Vasoneumatic Device  Interventions: Gait Training, Manual Therapy, Neuromuscular Re-education, Therapeutic Activity, Therapeutic Exercise, Aquatic Therapy    Goals not created as this was 1 time eval only due to lack of appropriateness of PT treatment. Consultation provided.      Frequency of Treatment: 1 time only  Duration of Treatment: 1 day    Education Assessment:   Learner/Method: Patient;Listening;Reading;Demonstration;Pictures/Video    Risks and benefits of evaluation/treatment have been explained.   Patient/Family/caregiver agrees with Plan of Care.     Evaluation Time:     PT Eval, Low Complexity Minutes (90036): 30  Evaluation Only     Signing Clinician: Otoniel Huerta, PT        [facility]                                                                                   OUTPATIENT PHYSICAL THERAPY      PLAN OF TREATMENT FOR OUTPATIENT REHABILITATION   Patient's Last Name, First Name, Phi Lucio YOB: 1995    Provider's Name   [facility]   Medical Record No.  [id number]     Onset Date: 02/20/25 (date of referral)  Start of Care Date: 03/31/25     Medical Diagnosis:  Left knee pain and impingement      PT Treatment Diagnosis:  none Plan of Treatment  Frequency/Duration: 1 time only/ 1 day    Certification date from 03/31/25 to 03/31/25         See note for plan of treatment details and functional goals     Otoniel Huerta, PT                         I CERTIFY THE NEED FOR THESE SERVICES FURNISHED UNDER        THIS PLAN OF TREATMENT AND WHILE UNDER MY CARE     (Physician attestation of this document indicates review and certification of the therapy plan).              Referring Provider:  Yomaira Faulkner    Initial Assessment  See Epic Evaluation- Start of Care Date: 03/31/25

## 2025-06-05 ENCOUNTER — TRANSFERRED RECORDS (OUTPATIENT)
Dept: HEALTH INFORMATION MANAGEMENT | Facility: OTHER | Age: 30
End: 2025-06-05
Payer: MEDICARE

## 2025-07-27 ENCOUNTER — APPOINTMENT (OUTPATIENT)
Dept: GENERAL RADIOLOGY | Facility: OTHER | Age: 30
End: 2025-07-27
Attending: PHYSICIAN ASSISTANT

## 2025-07-27 ENCOUNTER — PATIENT OUTREACH (OUTPATIENT)
Dept: CARE COORDINATION | Facility: CLINIC | Age: 30
End: 2025-07-27
Payer: MEDICARE

## 2025-07-27 ENCOUNTER — HOSPITAL ENCOUNTER (EMERGENCY)
Facility: OTHER | Age: 30
Discharge: HOME | End: 2025-07-27
Attending: PHYSICIAN ASSISTANT
Payer: OTHER MISCELLANEOUS

## 2025-07-27 ASSESSMENT — COLUMBIA-SUICIDE SEVERITY RATING SCALE - C-SSRS
1. IN THE PAST MONTH, HAVE YOU WISHED YOU WERE DEAD OR WISHED YOU COULD GO TO SLEEP AND NOT WAKE UP?: NO
6. HAVE YOU EVER DONE ANYTHING, STARTED TO DO ANYTHING, OR PREPARED TO DO ANYTHING TO END YOUR LIFE?: NO
2. HAVE YOU ACTUALLY HAD ANY THOUGHTS OF KILLING YOURSELF IN THE PAST MONTH?: NO

## 2025-07-27 ASSESSMENT — ACTIVITIES OF DAILY LIVING (ADL)
ADLS_ACUITY_SCORE: 41
ADLS_ACUITY_SCORE: 41

## 2025-07-27 NOTE — ED PROVIDER NOTES
EMERGENCY DEPARTMENT ENCOUNTER      NAME: Phi Duncan  AGE: 29 year old male  YOB: 1995  MRN: [id number]  EVALUATION DATE & TIME: 7/27/2025  1:40 PM    PCP: Yomaira Faulkner    ED PROVIDER: Adrian Caballero PA-C       CHIEF COMPLAINT:  Chief Complaint   Patient presents with    Elbow Injury         HPI  Phi Duncan is a pleasant right-hand-dominant 29 year old male who presents to the ER today via private vehicle for concerns of a right elbow injury sustained yesterday while at work.  Patient had a resident in a hold when he was taken down landing directly on the OxyNorm process.  Patient having significant pain following this.  Patient also having numbness and weakness along the ulnar aspect of his right forearm into his right ring and small fingers.  Difficulty with gripping.  Patient having mild to moderate pain at rest, worse pain with movement.  No prior elbow injuries.  No wrist or hand pain.  No right shoulder pain.  No head or neck injury.      REVIEW OF SYSTEMS   Review of Systems  As above, otherwise ROS is unremarkable.      PAST MEDICAL HISTORY:  Past Medical History:   Diagnosis Date    Attention-deficit hyperactivity disorder     No Comments Provided    Major depressive disorder, single episode     No Comments Provided    Personal history of other medical treatment (CODE)     No Comments Provided    Sleep deprivation     No Comments Provided         PAST SURGICAL HISTORY:  Past Surgical History:   Procedure Laterality Date    EXTRACTION(S) DENTAL N/A     x8; then braces placed    EXTRACTION(S) DENTAL      wisdom teeth    LACERATION REPAIR N/A 2015    to upper lip; done in OR    OPEN REDUCTION INTERNAL FIXATION TOE(S) Left 6/18/2020    Procedure: left 2nd toe ORIF, plantar plate repair, and excision of fracture fragment;  Surgeon: Avinash Singer DPM;  Location:  OR         CURRENT MEDICATIONS:    Current Outpatient Medications   Medication Instructions    ALPRAZolam  (XANAX) 0.5 mg, Oral, ONCE PRN    HYDROcodone-acetaminophen (NORCO) 5-325 MG tablet 1 tablet, Oral, AT BEDTIME PRN, Workmen's Comp.    predniSONE (DELTASONE) 40 mg, Oral, DAILY, Workman's Comp         ALLERGIES:  No Known Allergies      FAMILY HISTORY:  Family History   Problem Relation Age of Onset    Diabetes Mother     Cancer Mother         Cancer,Unsure type- in pelvis    Alcoholism Father     No Known Problems Sister          SOCIAL HISTORY:   Social History     Socioeconomic History    Marital status: Single     Spouse name: None    Number of children: None    Years of education: None    Highest education level: None   Tobacco Use    Smoking status: Former     Current packs/day: 0.00     Average packs/day: 0.1 packs/day for 8.0 years (0.8 ttl pk-yrs)     Types: Cigarettes     Start date: 6/15/2013     Quit date: 6/15/2021     Years since quittin.1    Smokeless tobacco: Never    Tobacco comments:     6 a day-Quit smoking: trying to quit   Vaping Use    Vaping status: Every Day    Substances: Nicotine, THC    Devices: [employer] tank   Substance and Sexual Activity    Alcohol use: Yes     Alcohol/week: 0.0 standard drinks of alcohol     Comment: occasionally     Drug use: No    Sexual activity: Yes     Partners: Female     Birth control/protection: Condom   Social History Narrative    Previously in residential care at [facility]. He reports he got there by stealing an Ipod.  Currently living in a foster home on [facility].  He loves to fish.    Works at [employer]     Social Drivers of Health     Food Insecurity: Low Risk  (2024)    Food Insecurity     Within the past 12 months, did you worry that your food would run out before you got money to buy more?: No     Within the past 12 months, did the food you bought just not last and you didn t have money to get more?: No   Interpersonal Safety: Low Risk  (2025)    Interpersonal Safety     Do you feel physically and emotionally safe where you  "currently live?: Yes     Within the past 12 months, have you been hit, slapped, kicked or otherwise physically hurt by someone?: No     Within the past 12 months, have you been humiliated or emotionally abused in other ways by your partner or ex-partner?: No       ==================================================================================================================================    PHYSICAL EXAM    VITAL SIGNS: /86   Pulse 82   Temp 97.2  F (36.2  C) (Tympanic)   Resp 20   Ht 1.676 m (5' 6\")   Wt 80.7 kg (178 lb)   SpO2 96%   BMI 28.73 kg/m      Patient Vitals for the past 24 hrs:   BP Temp Temp src Pulse Resp SpO2 Height Weight   07/27/25 1338 137/86 97.2  F (36.2  C) Tympanic 82 20 96 % 1.676 m (5' 6\") 80.7 kg (178 lb)       Physical Exam  Vitals and nursing note reviewed.   Constitutional:       Appearance: Normal appearance.   HENT:      Head: Normocephalic and atraumatic.      Right Ear: External ear normal.      Left Ear: External ear normal.      Nose: Nose normal.      Mouth/Throat:      Mouth: Mucous membranes are moist.   Eyes:      Conjunctiva/sclera: Conjunctivae normal.   Pulmonary:      Effort: Pulmonary effort is normal.   Musculoskeletal:      Cervical back: Normal range of motion.      Comments: Evaluation of right extremity reveals no gross swelling or effusion.  No erythema or warmth.  Patient with tenderness over the left neuro process without signs of bursitis or septic bursitis.  No erythema warmth to suggest septic joint.  No gross deformity.  Full active range of motion at the right elbow.  No right wrist or right shoulder tenderness.  Decreased sensation along the ulnar nerve distribution from the elbow into the right hand.   Skin:     General: Skin is warm and dry.   Neurological:      Mental Status: He is alert.      Comments: Patient with 4/5 weakness in the ulnar nerve distribution.  Patient with difficulty making a fist with his right ring and small fingers.  " "Decreased sensation in the ulnar nerve distribution extending from the elbow into the hand and right ring and small fingers.   Psychiatric:         Mood and Affect: Mood normal.            ==================================================================================================================================    LABS & RADIOLOGY:  All pertinent labs reviewed and interpreted. Reviewed all pertinent imaging. Please see official radiology report.  Results for orders placed or performed during the hospital encounter of 07/27/25   XR Elbow Port Right G/E 3 Views    Impression    IMPRESSION: Normal joint spaces and alignment. No fracture or joint effusion.     XR Elbow Port Right G/E 3 Views   Final Result   IMPRESSION: Normal joint spaces and alignment. No fracture or joint effusion.            ==================================================================================================================================    ED COURSE, MEDICAL DECISION MAKING, FINAL IMPRESSION AND PLAN:     Assessment / Plan:  1. Elbow injury, right, initial encounter    2. Ulnar neuropathy of right upper extremity    3. Work related injury        The patient was interviewed and examined.  HPI and physical exam as below.  Differential diagnosis and MDM Key Documentation Elements as below.  Vitals, triage note, and nursing notes were reviewed.  /86   Pulse 82   Temp 97.2  F (36.2  C) (Tympanic)   Resp 20   Ht 1.676 m (5' 6\")   Wt 80.7 kg (178 lb)   SpO2 96%   BMI 28.73 kg/m      #Right elbow injury  #Right upper extremity ulnar neuropathy  #Work-related injury  -No signs of septic joint.  No signs of open wound.  No signs of cellulitis.  Patient with tenderness over the left arm process with x-ray is not showing any signs of acute fracture or joint effusion.  - Patient with decreased sensation and  strength weakness along the ulnar nerve distribution most likely secondary from trauma which would be consistent " "with an ulnar nerve neuropathy.  - Patient was given IM Decadron 10 mg here in the ER.  Patient placed into a sling for comfort.  - Plan is for patient to be referred to orthopedics for further evaluation of ulnar neuropathy and right elbow contusion.  Patient be prescribed prednisone 40 mg daily for 5 days to help with inflammation stiffness opposite ulnar nerve neuropathy.  May use Norco 1 tab at night for pain, quantity 3 given.  Report of workability form completed.  Referral to orthopedics given.    - Return to the ER for any new or worsening symptoms.      Pertinent Labs & Imaging studies reviewed. (See chart for details)  Results for orders placed or performed during the hospital encounter of 07/27/25   XR Elbow Port Right G/E 3 Views    Impression    IMPRESSION: Normal joint spaces and alignment. No fracture or joint effusion.     No results found for: \"ABORH\"      Reassessments, Medications, Interventions, & Response to Treatments:  -as above    Medications given during today's ER visit:  Medications   dexAMETHasone PF (DECADRON) injection 10 mg (10 mg Intramuscular $Given 7/27/25 4673)       Consultations:  None    Decision Rules, Medical Calculators, Sepsis Criteria, and Risk Stratification Tools:  None    MDM Key Documentation Elements for Patient's Evaluation:  Differential diagnosis to include high risk considerations: As above  Escalation to admission/observation considered: Admission/observation considered, but patient does not meet admission criteria  Discussions and management with other clinicians:    3a. Independent interpretation of testing performed by another health professional:  -No  3b. Discussion of management or test interpretation with another health professional: -No  Independent interpretation of tests:  Ordering and/or review of 1 test(s)  Discussion of test interpretations with radiology:  No  History obtained from source other than patient or assessment requiring an independent " historian:  No  Review of non-ED/external records:  review of 1 records  Diagnostic tests considered but not ultimately performed/deferred:  -MRI right elbow, CT head  Prescription medications considered but not prescribed:  -Percocet  Chronic conditions affecting care:  -None  Care affected by social determinants of health:  -None    The patient's management involved:   - Imaging studies  - Prescription drug management      A shared decision making model was used. Time was taken to answer all questions.  Patient and/or associated parties understood and were agreeable to treatment plan.  Plan and all results were discussed. Warning signs and close return precautions to return to the ED given. Copy of results given. Discharged in stable condition. Discharged with discharge instructions outlining plan for further care and follow up.      New prescriptions started at today's ER visit  New Prescriptions    HYDROCODONE-ACETAMINOPHEN (NORCO) 5-325 MG TABLET    Take 1 tablet by mouth nightly as needed. Workmen's Comp.    PREDNISONE (DELTASONE) 20 MG TABLET    Take 2 tablets (40 mg) by mouth daily. Workman's Comp       ==================================================================================================================================      Ken CARRASCO PA-C, personally performed the services described in this documentation, and it is both accurate and complete.       Adrian Caballero PA-C  07/27/25 8260

## 2025-07-27 NOTE — DISCHARGE INSTRUCTIONS
- No signs of acute fracture on x-rays.  -Exam today seems consistent with an elbow contusion with a secondary ulnar nerve neuropathy.  - Would recommend icing down affected area.  Using prednisone 40 mg starting tomorrow morning for 5 days.  Follow-up with orthopedics for repeat evaluation within 1 to 2 weeks.  Call tomorrow to schedule follow-up appointment.  - Use Norco 1 tablet at nighttime for pain.  During the day use Tylenol.  Do not use Norco and Tylenol together.

## 2025-07-27 NOTE — ED TRIAGE NOTES
"ED Nursing Triage Note (General)   ________________________________    Phi Duncan is a 29 year old Male that presents to triage via private vehicle with complaints of R) sided elbow pain.  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  Patient is here as a workmans comp visit.   Significant symptoms had onset 24 hour(s) ago.  Vital signs:  Temp: 97.2  F (36.2  C) Temp src: Tympanic BP: 137/86 Pulse: 82   Resp: 20 SpO2: 96 %     Height: 167.6 cm (5' 6\") Weight: 80.7 kg (178 lb)  Estimated body mass index is 28.73 kg/m  as calculated from the following:    Height as of this encounter: 1.676 m (5' 6\").    Weight as of this encounter: 80.7 kg (178 lb).       PRE HOSPITAL PRIOR LIVING SITUATION Alone      Triage Assessment (Adult)       Row Name 07/27/25 6102          Triage Assessment    Airway WDL WDL        Respiratory WDL    Respiratory WDL WDL        Skin Circulation/Temperature WDL    Skin Circulation/Temperature WDL WDL        Cardiac WDL    Cardiac WDL WDL     Cardiac Rhythm NSR        Peripheral/Neurovascular WDL    Peripheral Neurovascular WDL WDL        Cognitive/Neuro/Behavioral WDL    Cognitive/Neuro/Behavioral WDL WDL           "

## 2025-08-01 ENCOUNTER — HOSPITAL ENCOUNTER (EMERGENCY)
Facility: OTHER | Age: 30
Discharge: HOME | End: 2025-08-01
Attending: PHYSICIAN ASSISTANT
Payer: OTHER MISCELLANEOUS

## 2025-08-01 ENCOUNTER — APPOINTMENT (OUTPATIENT)
Dept: GENERAL RADIOLOGY | Facility: OTHER | Age: 30
End: 2025-08-01
Attending: PHYSICIAN ASSISTANT
Payer: OTHER MISCELLANEOUS

## 2025-08-01 ASSESSMENT — ACTIVITIES OF DAILY LIVING (ADL)
ADLS_ACUITY_SCORE: 41
ADLS_ACUITY_SCORE: 41

## 2025-08-01 ASSESSMENT — COLUMBIA-SUICIDE SEVERITY RATING SCALE - C-SSRS
2. HAVE YOU ACTUALLY HAD ANY THOUGHTS OF KILLING YOURSELF IN THE PAST MONTH?: NO
6. HAVE YOU EVER DONE ANYTHING, STARTED TO DO ANYTHING, OR PREPARED TO DO ANYTHING TO END YOUR LIFE?: NO
1. IN THE PAST MONTH, HAVE YOU WISHED YOU WERE DEAD OR WISHED YOU COULD GO TO SLEEP AND NOT WAKE UP?: NO

## 2025-08-03 ENCOUNTER — HOSPITAL ENCOUNTER (EMERGENCY)
Facility: OTHER | Age: 30
Discharge: HOME | End: 2025-08-03
Payer: MEDICARE

## 2025-08-03 ENCOUNTER — APPOINTMENT (OUTPATIENT)
Dept: CT IMAGING | Facility: OTHER | Age: 30
End: 2025-08-03
Payer: MEDICARE

## 2025-08-03 VITALS
OXYGEN SATURATION: 97 % | TEMPERATURE: 97.4 F | BODY MASS INDEX: 28.61 KG/M2 | HEART RATE: 63 BPM | WEIGHT: 178 LBS | HEIGHT: 66 IN | DIASTOLIC BLOOD PRESSURE: 65 MMHG | SYSTOLIC BLOOD PRESSURE: 121 MMHG | RESPIRATION RATE: 22 BRPM

## 2025-08-03 DIAGNOSIS — R10.33 PERIUMBILICAL ABDOMINAL PAIN: Primary | ICD-10-CM

## 2025-08-03 LAB
ALBUMIN SERPL BCG-MCNC: 4.5 G/DL (ref 3.5–5.2)
ALBUMIN UR-MCNC: 10 MG/DL
ALP SERPL-CCNC: 87 U/L (ref 40–150)
ALT SERPL W P-5'-P-CCNC: 12 U/L (ref 0–70)
ANION GAP SERPL CALCULATED.3IONS-SCNC: 12 MMOL/L (ref 7–15)
APPEARANCE UR: CLEAR
AST SERPL W P-5'-P-CCNC: 18 U/L (ref 0–45)
BACTERIA #/AREA URNS HPF: ABNORMAL /HPF
BASOPHILS # BLD AUTO: 0.1 10E3/UL (ref 0–0.2)
BASOPHILS NFR BLD AUTO: 0 %
BILIRUB SERPL-MCNC: 0.5 MG/DL
BILIRUB UR QL STRIP: NEGATIVE
BUN SERPL-MCNC: 26.3 MG/DL (ref 6–20)
CALCIUM SERPL-MCNC: 9.5 MG/DL (ref 8.8–10.4)
CHLORIDE SERPL-SCNC: 105 MMOL/L (ref 98–107)
COLOR UR AUTO: ABNORMAL
CREAT SERPL-MCNC: 0.91 MG/DL (ref 0.67–1.17)
CRP SERPL-MCNC: <3 MG/L
EGFRCR SERPLBLD CKD-EPI 2021: >90 ML/MIN/1.73M2
EOSINOPHIL # BLD AUTO: 0.1 10E3/UL (ref 0–0.7)
EOSINOPHIL NFR BLD AUTO: 0 %
ERYTHROCYTE [DISTWIDTH] IN BLOOD BY AUTOMATED COUNT: 13 % (ref 10–15)
GLUCOSE SERPL-MCNC: 94 MG/DL (ref 70–99)
GLUCOSE UR STRIP-MCNC: NEGATIVE MG/DL
HCO3 SERPL-SCNC: 24 MMOL/L (ref 22–29)
HCT VFR BLD AUTO: 50.7 % (ref 40–53)
HGB BLD-MCNC: 18.1 G/DL (ref 13.3–17.7)
HGB UR QL STRIP: NEGATIVE
HOLD SPECIMEN: NORMAL
HOLD SPECIMEN: NORMAL
IMM GRANULOCYTES # BLD: 0.1 10E3/UL
IMM GRANULOCYTES NFR BLD: 1 %
KETONES UR STRIP-MCNC: NEGATIVE MG/DL
LACTATE SERPL-SCNC: 1.1 MMOL/L (ref 0.7–2)
LEUKOCYTE ESTERASE UR QL STRIP: NEGATIVE
LYMPHOCYTES # BLD AUTO: 1.1 10E3/UL (ref 0.8–5.3)
LYMPHOCYTES NFR BLD AUTO: 7 %
MCH RBC QN AUTO: 29.4 PG (ref 26.5–33)
MCHC RBC AUTO-ENTMCNC: 35.7 G/DL (ref 31.5–36.5)
MCV RBC AUTO: 82 FL (ref 78–100)
MONOCYTES # BLD AUTO: 0.6 10E3/UL (ref 0–1.3)
MONOCYTES NFR BLD AUTO: 4 %
MUCOUS THREADS #/AREA URNS LPF: PRESENT /LPF
NEUTROPHILS # BLD AUTO: 14 10E3/UL (ref 1.6–8.3)
NEUTROPHILS NFR BLD AUTO: 88 %
NITRATE UR QL: NEGATIVE
NRBC # BLD AUTO: 0 10E3/UL
NRBC BLD AUTO-RTO: 0 /100
PH UR STRIP: 6 [PH] (ref 5–9)
PLATELET # BLD AUTO: 348 10E3/UL (ref 150–450)
POTASSIUM SERPL-SCNC: 4 MMOL/L (ref 3.4–5.3)
PROT SERPL-MCNC: 7.1 G/DL (ref 6.4–8.3)
RBC # BLD AUTO: 6.16 10E6/UL (ref 4.4–5.9)
RBC URINE: 1 /HPF
SODIUM SERPL-SCNC: 141 MMOL/L (ref 135–145)
SP GR UR STRIP: 1.03 (ref 1–1.03)
UROBILINOGEN UR STRIP-MCNC: NORMAL MG/DL
WBC # BLD AUTO: 16 10E3/UL (ref 4–11)
WBC URINE: 1 /HPF

## 2025-08-03 PROCEDURE — 74177 CT ABD & PELVIS W/CONTRAST: CPT | Mod: 26 | Performed by: STUDENT IN AN ORGANIZED HEALTH CARE EDUCATION/TRAINING PROGRAM

## 2025-08-03 PROCEDURE — 250N000011 HC RX IP 250 OP 636

## 2025-08-03 PROCEDURE — 81003 URINALYSIS AUTO W/O SCOPE: CPT

## 2025-08-03 PROCEDURE — 96375 TX/PRO/DX INJ NEW DRUG ADDON: CPT

## 2025-08-03 PROCEDURE — 86140 C-REACTIVE PROTEIN: CPT

## 2025-08-03 PROCEDURE — 96361 HYDRATE IV INFUSION ADD-ON: CPT

## 2025-08-03 PROCEDURE — 96374 THER/PROPH/DIAG INJ IV PUSH: CPT | Mod: XU

## 2025-08-03 PROCEDURE — 258N000003 HC RX IP 258 OP 636

## 2025-08-03 PROCEDURE — 99285 EMERGENCY DEPT VISIT HI MDM: CPT | Mod: 25

## 2025-08-03 PROCEDURE — 250N000009 HC RX 250

## 2025-08-03 PROCEDURE — 83605 ASSAY OF LACTIC ACID: CPT

## 2025-08-03 PROCEDURE — 85004 AUTOMATED DIFF WBC COUNT: CPT

## 2025-08-03 PROCEDURE — 99284 EMERGENCY DEPT VISIT MOD MDM: CPT

## 2025-08-03 PROCEDURE — 74177 CT ABD & PELVIS W/CONTRAST: CPT

## 2025-08-03 PROCEDURE — 36415 COLL VENOUS BLD VENIPUNCTURE: CPT

## 2025-08-03 PROCEDURE — 80053 COMPREHEN METABOLIC PANEL: CPT

## 2025-08-03 RX ORDER — KETOROLAC TROMETHAMINE 15 MG/ML
15 INJECTION, SOLUTION INTRAMUSCULAR; INTRAVENOUS ONCE
Status: COMPLETED | OUTPATIENT
Start: 2025-08-03 | End: 2025-08-03

## 2025-08-03 RX ORDER — ONDANSETRON 4 MG/1
4 TABLET, ORALLY DISINTEGRATING ORAL EVERY 8 HOURS PRN
Qty: 5 TABLET | Refills: 0 | Status: SHIPPED | OUTPATIENT
Start: 2025-08-03

## 2025-08-03 RX ORDER — ONDANSETRON 2 MG/ML
4 INJECTION INTRAMUSCULAR; INTRAVENOUS EVERY 30 MIN PRN
Status: DISCONTINUED | OUTPATIENT
Start: 2025-08-03 | End: 2025-08-03 | Stop reason: HOSPADM

## 2025-08-03 RX ORDER — IOPAMIDOL 755 MG/ML
100 INJECTION, SOLUTION INTRAVASCULAR ONCE
Status: COMPLETED | OUTPATIENT
Start: 2025-08-03 | End: 2025-08-03

## 2025-08-03 RX ADMIN — FAMOTIDINE 20 MG: 10 INJECTION, SOLUTION INTRAVENOUS at 13:46

## 2025-08-03 RX ADMIN — KETOROLAC TROMETHAMINE 15 MG: 15 INJECTION, SOLUTION INTRAMUSCULAR; INTRAVENOUS at 13:46

## 2025-08-03 RX ADMIN — SODIUM CHLORIDE 1000 ML: 0.9 INJECTION, SOLUTION INTRAVENOUS at 13:45

## 2025-08-03 RX ADMIN — IOPAMIDOL 100 ML: 755 INJECTION, SOLUTION INTRAVENOUS at 14:25

## 2025-08-03 RX ADMIN — ONDANSETRON 4 MG: 2 INJECTION INTRAMUSCULAR; INTRAVENOUS at 13:45

## 2025-08-03 RX ADMIN — SODIUM CHLORIDE 60 ML: 9 INJECTION, SOLUTION INTRAVENOUS at 14:26

## 2025-08-03 ASSESSMENT — ACTIVITIES OF DAILY LIVING (ADL)
ADLS_ACUITY_SCORE: 41

## 2025-08-03 ASSESSMENT — ENCOUNTER SYMPTOMS
VOMITING: 1
NAUSEA: 1
DIAPHORESIS: 1
ABDOMINAL PAIN: 1

## 2025-08-13 ENCOUNTER — OFFICE VISIT (OUTPATIENT)
Dept: FAMILY MEDICINE | Facility: OTHER | Age: 30
End: 2025-08-13
Attending: CHIROPRACTOR
Payer: OTHER MISCELLANEOUS

## 2025-08-13 VITALS
DIASTOLIC BLOOD PRESSURE: 62 MMHG | RESPIRATION RATE: 16 BRPM | OXYGEN SATURATION: 94 % | WEIGHT: 165 LBS | BODY MASS INDEX: 26.52 KG/M2 | HEIGHT: 66 IN | SYSTOLIC BLOOD PRESSURE: 118 MMHG | HEART RATE: 80 BPM

## 2025-08-13 DIAGNOSIS — S59.901A INJURY OF RIGHT ELBOW, INITIAL ENCOUNTER: ICD-10-CM

## 2025-08-13 DIAGNOSIS — M25.521 RIGHT ELBOW PAIN: ICD-10-CM
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
## 2025-08-13 ASSESSMENT — PAIN SCALES - GENERAL: PAINLEVEL_OUTOF10: MODERATE PAIN (6)

## 2025-08-15 ENCOUNTER — HOSPITAL ENCOUNTER (OUTPATIENT)
Dept: MRI IMAGING | Facility: OTHER | Age: 30
Discharge: HOME | End: 2025-08-15
Attending: CHIROPRACTOR | Admitting: CHIROPRACTOR
Payer: MEDICARE

## 2025-08-15 PROCEDURE — 73221 MRI JOINT UPR EXTREM W/O DYE: CPT | Mod: RT

## 2025-08-20 ENCOUNTER — RESULTS FOLLOW-UP (OUTPATIENT)
Dept: FAMILY MEDICINE | Facility: OTHER | Age: 30
End: 2025-08-20
Payer: MEDICARE

## 2025-08-21 ENCOUNTER — TELEPHONE (OUTPATIENT)
Dept: FAMILY MEDICINE | Facility: OTHER | Age: 30
End: 2025-08-21
Payer: MEDICARE

## 2025-08-25 ENCOUNTER — OFFICE VISIT (OUTPATIENT)
Dept: FAMILY MEDICINE | Facility: OTHER | Age: 30
End: 2025-08-25
Payer: MEDICARE

## 2025-08-25 VITALS
OXYGEN SATURATION: 95 % | HEART RATE: 61 BPM | RESPIRATION RATE: 16 BRPM | HEIGHT: 66 IN | BODY MASS INDEX: 27.97 KG/M2 | DIASTOLIC BLOOD PRESSURE: 78 MMHG | WEIGHT: 174 LBS | TEMPERATURE: 98.4 F | SYSTOLIC BLOOD PRESSURE: 118 MMHG

## 2025-08-25 DIAGNOSIS — R19.7 DIARRHEA, UNSPECIFIED TYPE: ICD-10-CM

## 2025-08-25 DIAGNOSIS — B34.9 VIRAL ILLNESS: Primary | ICD-10-CM

## 2025-08-25 DIAGNOSIS — R11.2 NAUSEA AND VOMITING, UNSPECIFIED VOMITING TYPE: ICD-10-CM

## 2025-08-25 PROCEDURE — G0463 HOSPITAL OUTPT CLINIC VISIT: HCPCS

## 2025-08-25 PROCEDURE — 3074F SYST BP LT 130 MM HG: CPT

## 2025-08-25 PROCEDURE — 99213 OFFICE O/P EST LOW 20 MIN: CPT

## 2025-08-25 PROCEDURE — 3078F DIAST BP <80 MM HG: CPT

## 2025-08-25 PROCEDURE — 1125F AMNT PAIN NOTED PAIN PRSNT: CPT

## 2025-08-25 RX ORDER — ONDANSETRON 4 MG/1
4 TABLET, ORALLY DISINTEGRATING ORAL EVERY 8 HOURS PRN
Qty: 15 TABLET | Refills: 0 | Status: SHIPPED | OUTPATIENT
Start: 2025-08-25

## 2025-08-25 ASSESSMENT — PAIN SCALES - GENERAL: PAINLEVEL_OUTOF10: MILD PAIN (3)

## 2025-08-28 ENCOUNTER — OFFICE VISIT (OUTPATIENT)
Dept: FAMILY MEDICINE | Facility: OTHER | Age: 30
End: 2025-08-28
Attending: CHIROPRACTOR
Payer: OTHER MISCELLANEOUS

## 2025-08-28 VITALS
BODY MASS INDEX: 27.32 KG/M2 | WEIGHT: 170 LBS | HEART RATE: 52 BPM | OXYGEN SATURATION: 96 % | SYSTOLIC BLOOD PRESSURE: 116 MMHG | RESPIRATION RATE: 16 BRPM | HEIGHT: 66 IN | DIASTOLIC BLOOD PRESSURE: 61 MMHG
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
## 2025-08-28 DIAGNOSIS — M25.521 RIGHT ELBOW PAIN: ICD-10-CM

## 2025-08-28 DIAGNOSIS — S59.901A INJURY OF RIGHT ELBOW, INITIAL ENCOUNTER: Primary | ICD-10-CM

## 2025-08-28 ASSESSMENT — PAIN SCALES - GENERAL: PAINLEVEL_OUTOF10: NO PAIN (0)

## (undated) DEVICE — BLADE KNIFE SURG 15 371115

## (undated) DEVICE — DRSG XEROFORM 1X8"

## (undated) DEVICE — NDL 25GA 1.5" 305127

## (undated) DEVICE — PREP CHLORAPREP 26ML TINTED ORANGE  260815

## (undated) DEVICE — GLOVE PROTEXIS PI ORTHO PF 8.0 2D73HT80

## (undated) DEVICE — SOL WATER 1500ML

## (undated) DEVICE — DRILL BIT ARTHREX 1.7MM AR-8916-14

## (undated) DEVICE — DRAPE EXTREMITY W/ARMBOARD 29405

## (undated) DEVICE — GLOVE PROTEXIS BLUE W/NEU-THERA 7.5  2D73EB75

## (undated) DEVICE — DRAPE STERI TOWEL LG 1010

## (undated) DEVICE — PACK MAJOR EXTREMITY SOP15MEFCA

## (undated) DEVICE — TOURNIQUET SGL BLADDER 18"X4" RED 5921-218-135

## (undated) DEVICE — GLOVE PROTEXIS PI ORTHO PF 7.5 2D73HT75

## (undated) DEVICE — DRSG GAUZE 4X4" 3033

## (undated) DEVICE — Device

## (undated) DEVICE — DRSG KERLIX 4 1/2"X4YDS ROLL 6715

## (undated) DEVICE — GUIDE WIRE ARTHREX W/TROCAR TIP .062" AR-8941K

## (undated) DEVICE — GLOVE BIOGEL PI INDICATOR 8.0 LF 41680

## (undated) DEVICE — COVER LIGHT HANDLE LT-F02

## (undated) DEVICE — SUTURE 3-0 PROLENE FS-1 631G

## (undated) DEVICE — BNDG ELASTIC 4"X5YDS UNSTERILE 6611-40

## (undated) DEVICE — SU VICRYL 2-0 SH 27" UND J417H

## (undated) DEVICE — ESU GROUND PAD ADULT W/CORD E7507

## (undated) DEVICE — DRAPE C-ARM PACK 9"

## (undated) RX ORDER — PROPOFOL 10 MG/ML
INJECTION, EMULSION INTRAVENOUS
Status: DISPENSED
Start: 2020-06-18

## (undated) RX ORDER — GINSENG 100 MG
CAPSULE ORAL
Status: DISPENSED
Start: 2024-12-09

## (undated) RX ORDER — BUPIVACAINE HYDROCHLORIDE 5 MG/ML
INJECTION, SOLUTION EPIDURAL; INTRACAUDAL
Status: DISPENSED
Start: 2020-06-18

## (undated) RX ORDER — LIDOCAINE HYDROCHLORIDE 10 MG/ML
INJECTION, SOLUTION EPIDURAL; INFILTRATION; INTRACAUDAL; PERINEURAL
Status: DISPENSED
Start: 2020-06-18

## (undated) RX ORDER — IBUPROFEN 200 MG
TABLET ORAL
Status: DISPENSED
Start: 2024-12-17

## (undated) RX ORDER — FENTANYL CITRATE 50 UG/ML
INJECTION, SOLUTION INTRAMUSCULAR; INTRAVENOUS
Status: DISPENSED
Start: 2020-06-18

## (undated) RX ORDER — LIDOCAINE/RACEPINEP/TETRACAINE 4-0.05-0.5
GEL WITH PREFILLED APPLICATOR (ML) TOPICAL
Status: DISPENSED
Start: 2024-12-09

## (undated) RX ORDER — KETOROLAC TROMETHAMINE 15 MG/ML
INJECTION, SOLUTION INTRAMUSCULAR; INTRAVENOUS
Status: DISPENSED
Start: 2025-08-03

## (undated) RX ORDER — CEFAZOLIN SODIUM 2 G/100ML
INJECTION, SOLUTION INTRAVENOUS
Status: DISPENSED
Start: 2020-06-18

## (undated) RX ORDER — IBUPROFEN 400 MG/1
TABLET, FILM COATED ORAL
Status: DISPENSED
Start: 2024-12-17

## (undated) RX ORDER — LIDOCAINE HYDROCHLORIDE 10 MG/ML
INJECTION, SOLUTION EPIDURAL; INFILTRATION; INTRACAUDAL; PERINEURAL
Status: DISPENSED
Start: 2019-10-11

## (undated) RX ORDER — ONDANSETRON 2 MG/ML
INJECTION INTRAMUSCULAR; INTRAVENOUS
Status: DISPENSED
Start: 2025-08-03

## (undated) RX ORDER — LIDOCAINE HYDROCHLORIDE 10 MG/ML
INJECTION, SOLUTION INFILTRATION; PERINEURAL
Status: DISPENSED
Start: 2020-06-18

## (undated) RX ORDER — ONDANSETRON 2 MG/ML
INJECTION INTRAMUSCULAR; INTRAVENOUS
Status: DISPENSED
Start: 2020-06-18

## (undated) RX ORDER — LIDOCAINE HYDROCHLORIDE 20 MG/ML
INJECTION, SOLUTION EPIDURAL; INFILTRATION; INTRACAUDAL; PERINEURAL
Status: DISPENSED
Start: 2020-06-18

## (undated) RX ORDER — GINSENG 100 MG
CAPSULE ORAL
Status: DISPENSED
Start: 2024-12-17

## (undated) RX ORDER — ACETAMINOPHEN 500 MG
TABLET ORAL
Status: DISPENSED
Start: 2024-12-17

## (undated) RX ORDER — GINSENG 100 MG
CAPSULE ORAL
Status: DISPENSED
Start: 2023-06-21